# Patient Record
Sex: FEMALE | Race: OTHER | HISPANIC OR LATINO | Employment: FULL TIME | URBAN - METROPOLITAN AREA
[De-identification: names, ages, dates, MRNs, and addresses within clinical notes are randomized per-mention and may not be internally consistent; named-entity substitution may affect disease eponyms.]

---

## 2019-03-21 ENCOUNTER — OFFICE VISIT (OUTPATIENT)
Dept: FAMILY MEDICINE CLINIC | Facility: CLINIC | Age: 19
End: 2019-03-21

## 2019-03-21 VITALS
BODY MASS INDEX: 26.93 KG/M2 | WEIGHT: 152 LBS | DIASTOLIC BLOOD PRESSURE: 76 MMHG | HEIGHT: 63 IN | OXYGEN SATURATION: 98 % | RESPIRATION RATE: 19 BRPM | HEART RATE: 78 BPM | SYSTOLIC BLOOD PRESSURE: 118 MMHG | TEMPERATURE: 97.4 F

## 2019-03-21 DIAGNOSIS — R22.0 RIGHT FACIAL SWELLING: Primary | ICD-10-CM

## 2019-03-21 PROCEDURE — 99212 OFFICE O/P EST SF 10 MIN: CPT | Performed by: PHYSICIAN ASSISTANT

## 2019-03-21 NOTE — PROGRESS NOTES
Subjective:     Patient ID: Karoline Dwyer  is a 22 y o  female with no known PMH who presents today in clinic for left sided facial swelling     - Patient states about one week ago, her boyfriend noticed that her left upper jaw area bulges out more than the right upper jaw area when she is actively chewing  She denies any fevers, chills, cough, congestion, tooth pain, mouth pain, or pain, warmth, or redness of the upper left jaw area  She denies any recent or prior trauma or injuries to the affected area  Of note, patient does grind her teeth at night and wears a mouth guard at night  Her last dentist visit was in November or December 2018  - Patient went to  urgent care for further evaluation  They told her it was normal and to take aleve as needed to decrease swelling  The following portions of the patient's history were reviewed and updated as appropriate: allergies, current medications, past family history, past medical history, past social history, past surgical history and problem list         Review of Systems   Constitutional: Negative for appetite change, fatigue, fever and unexpected weight change  HENT: Positive for facial swelling (right upper jaw area)  Negative for congestion, ear pain, postnasal drip, rhinorrhea and sore throat  Eyes: Negative for pain and visual disturbance  Respiratory: Negative for cough, chest tightness and shortness of breath  Cardiovascular: Negative for chest pain, palpitations and leg swelling  Gastrointestinal: Negative for abdominal pain, constipation, diarrhea, nausea and vomiting  Genitourinary: Negative for difficulty urinating and dysuria  Musculoskeletal: Negative for arthralgias and back pain  Skin: Negative for rash  Neurological: Negative for dizziness, numbness and headaches  Psychiatric/Behavioral: Negative for behavioral problems and suicidal ideas  The patient is not nervous/anxious           Objective:   Vitals:    03/21/19 1251   BP: 118/76   Pulse: 78   Resp: 19   Temp: (!) 97 4 °F (36 3 °C)   SpO2: 98%        Physical Exam   Constitutional: She is oriented to person, place, and time  She appears well-developed and well-nourished  HENT:   Head: Normocephalic and atraumatic  Right Ear: Tympanic membrane, external ear and ear canal normal    Left Ear: Tympanic membrane, external ear and ear canal normal    Nose: Nose normal    Mouth/Throat: Uvula is midline, oropharynx is clear and moist and mucous membranes are normal  No oropharyngeal exudate, posterior oropharyngeal edema or posterior oropharyngeal erythema  Eyes: Pupils are equal, round, and reactive to light  Conjunctivae and EOM are normal    Neck: Normal range of motion  Neck supple  Clicking of TMJ noted on left side  No pain associated  No bulging or swelling noted of right upper jaw area  Cardiovascular: Normal rate, regular rhythm and intact distal pulses  Murmur heard  Pulmonary/Chest: Effort normal and breath sounds normal  She has no wheezes  Abdominal: Soft  Bowel sounds are normal  There is no tenderness  Musculoskeletal: Normal range of motion  She exhibits no edema  Neurological: She is alert and oriented to person, place, and time  Skin: Skin is warm and dry  Capillary refill takes less than 2 seconds  Psychiatric: She has a normal mood and affect  Her behavior is normal    Nursing note and vitals reviewed  Assessment/Plan:    Right facial swelling  - Assured patient this is a normal finding  Explained that this area of your face naturally bulges out when chewing  Explained she is also using these same muscles when she grinds her teeth during the night  Advised to continue wearing  at night    - Advised to RTC if she starts to experience pain, warmth, fevers, or extreme swelling of this area  Diagnoses and all orders for this visit:    Right facial swelling      All of the patient's questions were answered   Patient understands and agrees with the above plan  Return if symptoms worsen or fail to improve; for Next scheduled follow up with Dr Rick Magana on 5/17/19       Aysha Ty PA-C  03/21/19

## 2019-03-22 NOTE — ASSESSMENT & PLAN NOTE
- Assured patient this is a normal finding  Explained that this area of your face naturally bulges out when chewing  Explained she is also using these same muscles when she grinds her teeth during the night  Advised to continue wearing  at night    - Advised to RTC if she starts to experience pain, warmth, fevers, or extreme swelling of this area

## 2019-06-24 ENCOUNTER — OFFICE VISIT (OUTPATIENT)
Dept: FAMILY MEDICINE CLINIC | Facility: CLINIC | Age: 19
End: 2019-06-24

## 2019-06-24 VITALS
BODY MASS INDEX: 27.46 KG/M2 | RESPIRATION RATE: 16 BRPM | TEMPERATURE: 97.6 F | DIASTOLIC BLOOD PRESSURE: 70 MMHG | SYSTOLIC BLOOD PRESSURE: 102 MMHG | WEIGHT: 155 LBS | OXYGEN SATURATION: 99 % | HEART RATE: 70 BPM

## 2019-06-24 DIAGNOSIS — R01.1 HEART MURMUR: ICD-10-CM

## 2019-06-24 DIAGNOSIS — Z76.89 ENCOUNTER TO ESTABLISH CARE: Primary | ICD-10-CM

## 2019-06-24 PROBLEM — R22.0 RIGHT FACIAL SWELLING: Status: RESOLVED | Noted: 2019-03-21 | Resolved: 2019-06-24

## 2019-06-24 PROCEDURE — 99203 OFFICE O/P NEW LOW 30 MIN: CPT | Performed by: INTERNAL MEDICINE

## 2019-08-01 ENCOUNTER — HOSPITAL ENCOUNTER (OUTPATIENT)
Dept: NON INVASIVE DIAGNOSTICS | Facility: HOSPITAL | Age: 19
Discharge: HOME | End: 2019-08-01
Payer: COMMERCIAL

## 2019-08-01 DIAGNOSIS — R01.1 HEART MURMUR: ICD-10-CM

## 2019-08-01 PROCEDURE — 93306 TTE W/DOPPLER COMPLETE: CPT

## 2019-08-02 ENCOUNTER — TELEPHONE (OUTPATIENT)
Dept: FAMILY MEDICINE CLINIC | Facility: CLINIC | Age: 19
End: 2019-08-02

## 2019-08-02 PROCEDURE — 93306 TTE W/DOPPLER COMPLETE: CPT | Performed by: INTERNAL MEDICINE

## 2019-10-14 ENCOUNTER — OFFICE VISIT (OUTPATIENT)
Dept: FAMILY MEDICINE CLINIC | Facility: CLINIC | Age: 19
End: 2019-10-14

## 2019-10-14 VITALS
WEIGHT: 167.3 LBS | HEIGHT: 63 IN | DIASTOLIC BLOOD PRESSURE: 76 MMHG | OXYGEN SATURATION: 99 % | BODY MASS INDEX: 29.64 KG/M2 | SYSTOLIC BLOOD PRESSURE: 122 MMHG | TEMPERATURE: 97.3 F | HEART RATE: 81 BPM | RESPIRATION RATE: 18 BRPM

## 2019-10-14 DIAGNOSIS — I37.0 NONRHEUMATIC PULMONARY VALVE STENOSIS: Primary | ICD-10-CM

## 2019-10-14 DIAGNOSIS — Z23 FLU VACCINE NEED: ICD-10-CM

## 2019-10-14 DIAGNOSIS — Z28.21 INFLUENZA VACCINE REFUSED: ICD-10-CM

## 2019-10-14 PROBLEM — Z76.89 ENCOUNTER TO ESTABLISH CARE: Status: RESOLVED | Noted: 2019-06-24 | Resolved: 2019-10-14

## 2019-10-14 PROCEDURE — 99213 OFFICE O/P EST LOW 20 MIN: CPT | Performed by: FAMILY MEDICINE

## 2019-10-14 NOTE — ASSESSMENT & PLAN NOTE
- Per history, congential  - Asymptomatic  - Echo 8/1/2019   Mild pulmonary stenosis with peak gradient 22 mmHg  - Peak gradient < 30 mmHg  Needs repeat Echo and EKG in 5 years or earlier if symptomatic  Discussed this with patient who is agreeable

## 2019-10-14 NOTE — PROGRESS NOTES
Assessment/Plan:    Nonrheumatic pulmonary valve stenosis  - Per history, congential  - Asymptomatic  - Echo 8/1/2019   Mild pulmonary stenosis with peak gradient 22 mmHg  - Peak gradient < 30 mmHg  Needs repeat Echo and EKG in 5 years or earlier if symptomatic  Discussed this with patient who is agreeable  Follow up   - Small patch of dryness R eye lid with no e/o inflammation/ scales  - No vision disturbance/ watering   No TTP    - Advised to clean cosmetic brushes  May use topical emollients as needed  RTC prn      Diagnoses and all orders for this visit:    Nonrheumatic pulmonary valve stenosis    Flu vaccine need  -     influenza vaccine, 3652-4507, quadrivalent, recombinant, PF, 0 5 mL, for patients 18 yr+ (FLUBLOK)    Influenza vaccine refused          Subjective:      Patient ID: Bean Salas is a 22 y o  female  22 y o female presents for f/u of Echo  History of congenital heart murmur  Denies any CP/ palpitation/ SOB/ orthopnea/ PND  C/O right eyelid patch of dryness that appears to be improving  No vision disturbance  No watering of eyes/ eye drainage  The following portions of the patient's history were reviewed and updated as appropriate: She  has a past medical history of Heart murmur  Patient Active Problem List    Diagnosis Date Noted    Nonrheumatic pulmonary valve stenosis 10/14/2019    Heart murmur 06/24/2019     She  has a past surgical history that includes Tonsillectomy  Her family history includes Coronary artery disease in her maternal grandfather; Diabetes in her maternal grandmother  She  reports that she has never smoked  She has never used smokeless tobacco  She reports that she does not drink alcohol or use drugs  No current outpatient medications on file  No current facility-administered medications for this visit  No current outpatient medications on file prior to visit  No current facility-administered medications on file prior to visit  She has No Known Allergies       Review of Systems   Constitutional: Negative for chills and fever  HENT: Negative for congestion and trouble swallowing  Eyes: Negative for photophobia, pain, discharge, redness, itching and visual disturbance  Respiratory: Negative for cough, shortness of breath and wheezing  Cardiovascular: Negative for chest pain, palpitations and leg swelling  Gastrointestinal: Negative for abdominal pain, blood in stool, constipation, diarrhea, nausea and vomiting  Genitourinary: Negative for dysuria and hematuria  Skin: Negative for rash  Neurological: Negative for seizures, weakness and headaches  Objective:      /76 (BP Location: Left arm, Patient Position: Sitting, Cuff Size: Standard)   Pulse 81   Temp (!) 97 3 °F (36 3 °C) (Temporal)   Resp 18   Ht 5' 3" (1 6 m)   Wt 75 9 kg (167 lb 4 8 oz)   LMP 09/18/2019 (Approximate)   SpO2 99%   Breastfeeding? No   BMI 29 64 kg/m²          Physical Exam   Constitutional: She is oriented to person, place, and time  She appears well-developed and well-nourished  No distress  HENT:   Head: Normocephalic and atraumatic  Mouth/Throat: No oropharyngeal exudate  Eyes: Pupils are equal, round, and reactive to light  Conjunctivae and EOM are normal  Right eye exhibits no discharge  Left eye exhibits no discharge  No scleral icterus  Neck: Normal range of motion  No JVD present  Cardiovascular: Normal rate, regular rhythm and normal heart sounds  Exam reveals no gallop and no friction rub  No murmur heard  Pulmonary/Chest: Effort normal  No respiratory distress  She has no wheezes  She has no rales  Abdominal: Soft  She exhibits no distension  There is no tenderness  There is no rebound and no guarding  Musculoskeletal: Normal range of motion  She exhibits no edema or tenderness  Lymphadenopathy:     She has no cervical adenopathy     Neurological: She is alert and oriented to person, place, and time  She exhibits normal muscle tone  Skin: Skin is warm  No rash noted  She is not diaphoretic  Psychiatric: She has a normal mood and affect  Vitals reviewed

## 2019-11-18 ENCOUNTER — TELEPHONE (OUTPATIENT)
Dept: FAMILY MEDICINE CLINIC | Facility: CLINIC | Age: 19
End: 2019-11-18

## 2019-11-18 ENCOUNTER — OFFICE VISIT (OUTPATIENT)
Dept: FAMILY MEDICINE CLINIC | Facility: CLINIC | Age: 19
End: 2019-11-18

## 2019-11-18 VITALS
RESPIRATION RATE: 16 BRPM | BODY MASS INDEX: 29.46 KG/M2 | TEMPERATURE: 98.9 F | DIASTOLIC BLOOD PRESSURE: 76 MMHG | HEART RATE: 88 BPM | SYSTOLIC BLOOD PRESSURE: 134 MMHG | WEIGHT: 166.3 LBS | OXYGEN SATURATION: 99 % | HEIGHT: 63 IN

## 2019-11-18 DIAGNOSIS — B95.8 STAPH SKIN INFECTION: Primary | ICD-10-CM

## 2019-11-18 DIAGNOSIS — L08.9 STAPH SKIN INFECTION: Primary | ICD-10-CM

## 2019-11-18 PROCEDURE — 3008F BODY MASS INDEX DOCD: CPT | Performed by: NURSE PRACTITIONER

## 2019-11-18 PROCEDURE — 1036F TOBACCO NON-USER: CPT | Performed by: NURSE PRACTITIONER

## 2019-11-18 PROCEDURE — 99213 OFFICE O/P EST LOW 20 MIN: CPT | Performed by: NURSE PRACTITIONER

## 2019-11-18 RX ORDER — MUPIROCIN CALCIUM 20 MG/G
CREAM TOPICAL 2 TIMES DAILY
Qty: 15 G | Refills: 0 | Status: SHIPPED | OUTPATIENT
Start: 2019-11-18 | End: 2021-02-10 | Stop reason: ALTCHOICE

## 2019-11-18 NOTE — TELEPHONE ENCOUNTER
Called [facility] to see if they had a formulary to give to us they had stated they did not, requesting a similar med or sometimes the ointment instead of the cream goes through

## 2019-11-18 NOTE — PATIENT INSTRUCTIONS
Impetigo, Ambulatory Care   GENERAL INFORMATION:   Impetigo  is a skin infection caused by bacteria  The infection can cause sores to form anywhere on your body  The sores develop watery or pus-filled blisters that break and form thick crusts  Impetigo is most common in children and spreads easily from person to person  Seek immediate care for the following symptoms:   · Painful, red, warm skin around the blisters    · Swollen face    · Urinating less than usual or blood in your urine  Treatment for impetigo  includes antibiotics to treat the bacterial infection  Antibiotics may be given as a pill or cream  Wash your skin and gently remove any crusts before you apply the antibiotic cream   Clean your sores safely:  Wash your skin sores with antibacterial soap and water  You may need to do this 2 to 3 times each day until the sores heal  If the area is crusted, gently wash the sores with gauze or a clean washcloth to remove the crust  Pat the area dry with a clean towel  Wash your hands, the washcloth, and the towel after you clean the area around the sores  Prevent the spread of impetigo:   · Avoid direct contact  You can spread impetigo if someone touches or uses something that touched your infected skin  You can also spread impetigo on your own body when you touch the area and then touch somewhere else  Keep the sores covered with gauze so you will not scratch or touch them  Keep your fingernails short  Your child may need to wear mittens so he does not scratch his sores  · Wash your hands often  Always wash your hands after you touch the infected area  Wash your hands before you touch food, your eyes, or other people  If no water is available, use an alcohol-based gel to clean your hands  · Wash household items  Do not share or reuse items that have come in contact with impetigo sores  Examples include bedding, towels, washcloths, and eating utensils   These items may be used again after they have been washed with hot water and soap  Return to work or school: You may return to work or school 48 hours after you start the antibiotic medicine  If your child has impetigo, tell his school or  center about the infection  Follow up with your healthcare provider as directed:  Write down your questions so you remember to ask them during your visits  CARE AGREEMENT:   You have the right to help plan your care  Learn about your health condition and how it may be treated  Discuss treatment options with your caregivers to decide what care you want to receive  You always have the right to refuse treatment  The above information is an  only  It is not intended as medical advice for individual conditions or treatments  Talk to your doctor, nurse or pharmacist before following any medical regimen to see if it is safe and effective for you  © 2014 [address] is for End User's use only and may not be sold, redistributed or otherwise used for commercial purposes  All illustrations and images included in CareNotes® are the copyrighted property of [employer]  or [employer]ear[employer]

## 2019-11-18 NOTE — TELEPHONE ENCOUNTER
Pt contacted office stating ins does not cover medication prescribed today   Pt was informed med cost $200

## 2019-11-18 NOTE — PROGRESS NOTES
Assessment/Plan:    Staph skin infection  -Patient with lesion to the scalp appearing to be bacterial in nature consistent with recent wound attained during hair appointment  Apply mupirocin as directed  Discussed with patient to avoid sharing hats, brushes, towels  Avoid irritating hair products such as hairspray, gel, creams  Discussed with patient that if not resolved or improving over next 1-2 weeks return for re-evaluation  Problem List Items Addressed This Visit     None      Visit Diagnoses     Staph skin infection    -  Primary    Relevant Medications    mupirocin (BACTROBAN) 2 % cream            Subjective:      Patient ID: Edmund Styles is a 22 y o  female  Patient is a 21 yo female who presents today for evaluation of rash to scalp  She reports that she got her hair blow dried in the salon on Friday at which time she sustained a small abrasion to the scalp from the brush  She states that there were no chemicals such as dyes or relaxers applied to her hair by the salon  On Saturday she noticed that there was a tender area to the scalp with yellow drainage  She reports that there is some pruritus to the area  There is no hair loss  There are no similar lesions to the rest of the scalp or other area of the body  She has not applied any over the counter treatments to the area  The following portions of the patient's history were reviewed and updated as appropriate: allergies, current medications, past family history, past medical history, past social history, past surgical history and problem list     Review of Systems   Constitutional: Negative for chills and fever  HENT: Negative for congestion  Respiratory: Negative for cough and shortness of breath  Cardiovascular: Negative for chest pain  Skin: Positive for rash (scalp)  Neurological: Negative for headaches           Objective:      /76 (BP Location: Left arm, Patient Position: Sitting, Cuff Size: Adult)   Pulse 88 Temp 98 9 °F (37 2 °C) (Tympanic)   Resp 16   Ht 5' 3" (1 6 m)   Wt 75 4 kg (166 lb 4 8 oz)   LMP 11/08/2019 (Exact Date)   SpO2 99%   Breastfeeding? No   BMI 29 46 kg/m²          Physical Exam   Constitutional: She is oriented to person, place, and time  She appears well-developed and well-nourished  No distress  HENT:   Head: Normocephalic and atraumatic  Eyes: Conjunctivae are normal    Neck: Neck supple  Cardiovascular: Normal rate and regular rhythm  Murmur heard  Pulmonary/Chest: Effort normal and breath sounds normal  No respiratory distress  Neurological: She is alert and oriented to person, place, and time  Skin: Skin is warm and dry  She is not diaphoretic  Nursing note and vitals reviewed

## 2019-12-16 ENCOUNTER — OFFICE VISIT (OUTPATIENT)
Dept: FAMILY MEDICINE CLINIC | Facility: CLINIC | Age: 19
End: 2019-12-16

## 2019-12-16 VITALS
OXYGEN SATURATION: 99 % | BODY MASS INDEX: 28.88 KG/M2 | SYSTOLIC BLOOD PRESSURE: 110 MMHG | WEIGHT: 163 LBS | RESPIRATION RATE: 16 BRPM | HEIGHT: 63 IN | DIASTOLIC BLOOD PRESSURE: 60 MMHG | TEMPERATURE: 97.6 F | HEART RATE: 75 BPM

## 2019-12-16 DIAGNOSIS — Z23 ENCOUNTER FOR IMMUNIZATION: ICD-10-CM

## 2019-12-16 DIAGNOSIS — Z00.00 PHYSICAL EXAM: Primary | ICD-10-CM

## 2019-12-16 DIAGNOSIS — N92.6 MISSED MENSES: ICD-10-CM

## 2019-12-16 DIAGNOSIS — L65.9 HAIR LOSS: ICD-10-CM

## 2019-12-16 LAB — SL AMB POCT URINE HCG: NEGATIVE

## 2019-12-16 PROCEDURE — 99395 PREV VISIT EST AGE 18-39: CPT | Performed by: FAMILY MEDICINE

## 2019-12-16 PROCEDURE — 81025 URINE PREGNANCY TEST: CPT | Performed by: FAMILY MEDICINE

## 2019-12-16 NOTE — ASSESSMENT & PLAN NOTE
- Patient seen in office on 11/18/2019 for ? scalp staph infection and treated with mupirocin  - Patch of hair loss with no e/o scarring and + Exclamation tawanda sign suspicious for alopecia areata  Per d/w patient referral to derm placed     - Check CBC, TSH

## 2019-12-16 NOTE — PROGRESS NOTES
Assessment/Plan:    Hair loss  - Patient seen in office on 11/18/2019 for ? scalp staph infection and treated with mupirocin  - Patch of hair loss with no e/o scarring and + Exclamation tawanda sign suspicious for alopecia areata  Per d/w patient referral to derm placed  - Check CBC, TSH     Physical exam  - Urine hCG negative at OV  - Recommended repeating urine hCG if her menses delayed by additional 2 weeks   - Discussed contraception with patient and her boyfriend  They prefer using condoms at this point        Diagnoses and all orders for this visit:    Hair loss  -     TSH, 3rd generation with Free T4 reflex; Future  -     CBC and differential; Future  -     Ambulatory referral to Dermatology; Future    Physical exam    Missed menses  -     POCT urine HCG    Encounter for immunization  -     Cancel: influenza vaccine, [id number], quadrivalent, 0 5 mL, preservative-free, for adult and pediatric patients 6 mos+ (AFLURIA, FLUARIX, FLULAVAL, FLUZONE)  -     influenza vaccine, [id number], quadrivalent, recombinant, PF, 0 5 mL, for patients 18 yr+ (FLUBLOK)          Subjective:      Patient ID: Naya Penaloza is a 20 y o  female  20 y o female presents for physical exam  Also concerned with delayed menses  Her cycles are usually about 35 - 40 days duration and her menses has been delayed by about a week now  Sexually active with boy friend and they use condoms  No concern with STI at this visit  Patient seen in office on 11/18/2019 for scalp rash  Was prescribed mupirocin for cocnern of staph infection  States the rash improved but now she noted a patch of hair loss on top of her scalp  No itching over spot  No additional patches of hair loss/ flakes on hair  No prior history  No family H/O alopecia  Family H/O thyroid disease ( hypothyroidism in maternal aunt)         The following portions of the patient's history were reviewed and updated as appropriate: She  has a past medical history of Heart murmur  Patient Active Problem List    Diagnosis Date Noted    Hair loss 12/16/2019    Nonrheumatic pulmonary valve stenosis 10/14/2019    Heart murmur 06/24/2019    Physical exam 06/24/2019     She  has a past surgical history that includes Tonsillectomy  Her family history includes Coronary artery disease in her maternal grandfather; Diabetes in her maternal grandmother  She  reports that she has never smoked  She has never used smokeless tobacco  She reports that she drank alcohol  She reports that she does not use drugs  Current Outpatient Medications   Medication Sig Dispense Refill    BIOTIN PO Take by mouth daily      mupirocin (BACTROBAN) 2 % cream Apply topically 2 (two) times a day 15 g 0     No current facility-administered medications for this visit  Current Outpatient Medications on File Prior to Visit   Medication Sig    BIOTIN PO Take by mouth daily    mupirocin (BACTROBAN) 2 % cream Apply topically 2 (two) times a day    [DISCONTINUED] mupirocin (BACTROBAN) 2 % ointment Apply topically 3 (three) times a day (Patient not taking: Reported on 12/16/2019)     No current facility-administered medications on file prior to visit  She has No Known Allergies       Review of Systems   Constitutional: Negative for chills and fever  HENT: Negative for congestion, rhinorrhea, sinus pain, sore throat and trouble swallowing  Eyes: Negative for visual disturbance  Respiratory: Negative for cough, shortness of breath and wheezing  Cardiovascular: Negative for chest pain, palpitations and leg swelling  Gastrointestinal: Negative for abdominal pain, blood in stool, constipation, diarrhea, nausea and vomiting  Genitourinary: Negative for dysuria and hematuria  Musculoskeletal: Negative for back pain and gait problem  Skin: Negative for rash  Hair loss +    Neurological: Negative for seizures, weakness and headaches  Hematological: Negative for adenopathy  Psychiatric/Behavioral: The patient is not nervous/anxious  Objective:      /60 (BP Location: Left arm, Patient Position: Sitting, Cuff Size: Standard)   Pulse 75   Temp 97 6 °F (36 4 °C) (Temporal)   Resp 16   Ht 5' 3" (1 6 m)   Wt 73 9 kg (163 lb)   LMP 11/08/2019   SpO2 99%   Breastfeeding? No   BMI 28 87 kg/m²          Physical Exam   Constitutional: She is oriented to person, place, and time  She appears well-developed and well-nourished  No distress  HENT:   Head: Normocephalic and atraumatic  Mouth/Throat: Oropharynx is clear and moist  No oropharyngeal exudate  Eyes: Conjunctivae and EOM are normal  Right eye exhibits no discharge  Left eye exhibits no discharge  Neck: Normal range of motion  Cardiovascular: Normal rate, regular rhythm and normal heart sounds  Exam reveals no gallop and no friction rub  No murmur heard  Pulmonary/Chest: Effort normal  No respiratory distress  She has no wheezes  She has no rales  Abdominal: Soft  She exhibits no distension  There is no tenderness  There is no rebound and no guarding  Musculoskeletal: She exhibits no edema or tenderness  Lymphadenopathy:     She has no cervical adenopathy  Neurological: She is alert and oriented to person, place, and time  She exhibits normal muscle tone  Skin: Skin is warm  She is not diaphoretic  Psychiatric: She has a normal mood and affect  Vitals reviewed

## 2019-12-16 NOTE — ASSESSMENT & PLAN NOTE
- Urine hCG negative at OV  - Recommended repeating urine hCG if her menses delayed by additional 2 weeks   - Discussed contraception with patient and her boyfriend   They prefer using condoms at this point

## 2020-02-25 ENCOUNTER — OFFICE VISIT (OUTPATIENT)
Dept: FAMILY MEDICINE CLINIC | Facility: CLINIC | Age: 20
End: 2020-02-25

## 2020-02-25 VITALS
BODY MASS INDEX: 29.23 KG/M2 | SYSTOLIC BLOOD PRESSURE: 122 MMHG | WEIGHT: 165 LBS | DIASTOLIC BLOOD PRESSURE: 70 MMHG | HEART RATE: 74 BPM | RESPIRATION RATE: 18 BRPM | TEMPERATURE: 97.9 F | OXYGEN SATURATION: 99 %

## 2020-02-25 DIAGNOSIS — R30.0 DYSURIA: ICD-10-CM

## 2020-02-25 DIAGNOSIS — R10.30 LOWER ABDOMINAL PAIN: Primary | ICD-10-CM

## 2020-02-25 LAB
SL AMB  POCT GLUCOSE, UA: NORMAL
SL AMB LEUKOCYTE ESTERASE,UA: NORMAL
SL AMB POCT BILIRUBIN,UA: NEGATIVE
SL AMB POCT BLOOD,UA: NORMAL
SL AMB POCT CLARITY,UA: CLEAR
SL AMB POCT COLOR,UA: YELLOW
SL AMB POCT KETONES,UA: NEGATIVE
SL AMB POCT NITRITE,UA: NEGATIVE
SL AMB POCT PH,UA: 5
SL AMB POCT SPECIFIC GRAVITY,UA: 1.02
SL AMB POCT URINE HCG: NEGATIVE
SL AMB POCT URINE PROTEIN: NORMAL
SL AMB POCT UROBILINOGEN: NEGATIVE

## 2020-02-25 PROCEDURE — 81002 URINALYSIS NONAUTO W/O SCOPE: CPT | Performed by: FAMILY MEDICINE

## 2020-02-25 PROCEDURE — 81025 URINE PREGNANCY TEST: CPT | Performed by: FAMILY MEDICINE

## 2020-02-25 PROCEDURE — 99214 OFFICE O/P EST MOD 30 MIN: CPT | Performed by: FAMILY MEDICINE

## 2020-02-25 PROCEDURE — 1036F TOBACCO NON-USER: CPT | Performed by: FAMILY MEDICINE

## 2020-02-25 NOTE — PATIENT INSTRUCTIONS
Abdominal Pain   AMBULATORY CARE:   Abdominal pain  can be dull, achy, or sharp  You may have pain in one area of your abdomen, or in your entire abdomen  Your pain may be caused by a condition such as constipation, food sensitivity or poisoning, infection, or a blockage  Abdominal pain can also be from a hernia, appendicitis, or an ulcer  Liver, gallbladder, or kidney conditions can also cause abdominal pain  The cause of your abdominal pain may be unknown  Seek care immediately if:   · You have new chest pain or shortness of breath  · You have pulsing pain in your upper abdomen or lower back that suddenly becomes constant  · Your pain is in the right lower abdominal area and worsens with movement  · You have a fever over 100 4°F (38°C) or shaking chills  · You are vomiting and cannot keep food or liquids down  · Your pain does not improve or gets worse over the next 8 to 12 hours  · You see blood in your vomit or bowel movements, or they look black and tarry  · Your skin or the whites of your eyes turn yellow  · You are a woman and have a large amount of vaginal bleeding that is not your monthly period  Contact your healthcare provider if:   · You have pain in your lower back  · You are a man and have pain in your testicles  · You have pain when you urinate  · You have questions or concerns about your condition or care  Treatment for abdominal pain  may include medicine to calm your stomach, prevent vomiting, or decrease pain  Follow up with your healthcare provider as directed:  Write down your questions so you remember to ask them during your visits  © 2017 [address]  Information is for End User's use only and may not be sold, redistributed or otherwise used for commercial purposes  All illustrations and images included in CareNotes® are the copyrighted property of [employer]  or [employer] Kear[employer]    The above information is an educational aid only  It is not intended as medical advice for individual conditions or treatments  Talk to your doctor, nurse or pharmacist before following any medical regimen to see if it is safe and effective for you

## 2020-02-25 NOTE — PROGRESS NOTES
Assessment/Plan:    Lower abdominal pain  Suprapubic abdominal pain that started earlier today, a few minutes after sexual intercourse  Denies trauma to the area, or new position use  Pain was a 10/10 at the time  Worse with bending down, better with laying down  No associated nausea, vomiting or diarrhea  No urinary symptoms including dysuria, frequency or urgency  Symptoms are unlikely appendicitis or pancreatitis due to the absence of nausea, vomiting, or diarrhea, and absence of fever  Speculum exam performed at the office today was unremarkable  Bimanual exam didn't yield any specific finding  POCT beta HCG and UA negative  Pain is likely musculoskeletal in nature, or it might be related to menstrual period  Last LMP was 1/24/2020, her period is due tomorrow     -Will order a transvaginal ultrasound  -Continue ibuprofen for pain  -Return to office if symptoms worsen   -Call the office or go to the ER if fever develop, or if nausea, vomiting or worsening abdominal pain          Diagnoses and all orders for this visit:    Lower abdominal pain  -     US abdomen and pelvis with transvaginal; Future    Dysuria  -     POCT urine dip  -     POCT urine HCG          Subjective:      Patient ID: Ye Menjivar is a 20 y o  female  HPI  Ye Menjivar is a 20 y o  female with no significant PMH who presents for same day visit due to abdominal pain  The pain  earlier today about 2 hours ago, a few minutes after sexual intercourse  Denies trauma to the area, or new sexual position use  Pain was a 10/10 at the time  Worse with bending down, better with laying down  Denies associated nausea, vomiting or diarrhea  Denies urinary symptoms including dysuria, frequency or urgency  Ibuprofen provided some relief and she now rate the pain as 7/10 in the beginning of the visit and rate it to 5/10 at the end of the visit  She reports that her last menstrual period was on 1/24/2020, her period is due tomorrow   She doesn't use any mode of contraction and is not currently trying to conceive  She denies similar symptoms in the past      The following portions of the patient's history were reviewed and updated as appropriate: allergies, current medications, past family history, past medical history, past social history, past surgical history and problem list     Review of Systems   Constitutional: Negative for appetite change, chills, diaphoresis, fatigue and fever  Respiratory: Negative for cough, chest tightness, shortness of breath, wheezing and stridor  Cardiovascular: Negative for chest pain, palpitations and leg swelling  Gastrointestinal: Positive for abdominal pain  Negative for abdominal distention, blood in stool, constipation, diarrhea, nausea and vomiting  Genitourinary: Positive for pelvic pain  Negative for difficulty urinating, dyspareunia, dysuria, hematuria, menstrual problem and urgency  Neurological: Negative for dizziness and facial asymmetry  Objective:      /70 (BP Location: Right arm, Patient Position: Sitting, Cuff Size: Adult)   Pulse 74   Temp 97 9 °F (36 6 °C)   Resp 18   Wt 74 8 kg (165 lb)   LMP 01/24/2020 (Exact Date)   SpO2 99%   BMI 29 23 kg/m²          Physical Exam   Constitutional: She is oriented to person, place, and time  She appears well-developed and well-nourished  No distress  HENT:   Head: Normocephalic and atraumatic  Eyes: No scleral icterus  Cardiovascular: Normal rate and normal heart sounds  No murmur heard  Pulmonary/Chest: Effort normal and breath sounds normal  No respiratory distress  Abdominal: Soft  Bowel sounds are normal  She exhibits no distension and no mass  There is tenderness (suprapubic and RLQ)  There is no rebound and no guarding  Musculoskeletal: Normal range of motion  She exhibits no deformity  Neurological: She is alert and oriented to person, place, and time  No cranial nerve deficit  Skin: Skin is warm and dry   No rash noted    Psychiatric: She has a normal mood and affect  Vitals reviewed

## 2020-02-25 NOTE — ASSESSMENT & PLAN NOTE
Suprapubic abdominal pain that started earlier today, a few minutes after sexual intercourse  Denies trauma to the area, or new position use  Pain was a 10/10 at the time  Worse with bending down, better with laying down  No associated nausea, vomiting or diarrhea  No urinary symptoms including dysuria, frequency or urgency  Symptoms are unlikely appendicitis or pancreatitis due to the absence of nausea, vomiting, or diarrhea, and absence of fever  Speculum exam performed at the office today was unremarkable  Bimanual exam didn't yield any specific finding  POCT beta HCG and UA negative  Pain is likely musculoskeletal in nature, or it might be related to menstrual period   Last LMP was 1/24/2020, her period is due tomorrow     -Will order a transvaginal ultrasound  -Continue ibuprofen for pain  -Return to office if symptoms worsen   -Call the office or go to the ER if fever develop, or if nausea, vomiting or worsening abdominal pain

## 2020-02-25 NOTE — LETTER
February 25, 2020     Patient: Ruby Pires   YOB: 2000   Date of Visit: 2/25/2020       To Whom it May Concern:    Ruby Pires is under my professional care  She was seen in my office on 2/25/2020  She may return to work on 2/26/2020  If you have any questions or concerns, please don't hesitate to call           Sincerely,          Roxi Vega MD

## 2020-02-28 ENCOUNTER — HOSPITAL ENCOUNTER (OUTPATIENT)
Dept: ULTRASOUND IMAGING | Facility: HOSPITAL | Age: 20
Discharge: HOME | End: 2020-02-28
Payer: COMMERCIAL

## 2020-02-28 ENCOUNTER — TELEPHONE (OUTPATIENT)
Dept: FAMILY MEDICINE CLINIC | Facility: CLINIC | Age: 20
End: 2020-02-28

## 2020-02-28 DIAGNOSIS — N83.201 RIGHT OVARIAN CYST: Primary | ICD-10-CM

## 2020-02-28 DIAGNOSIS — R10.30 LOWER ABDOMINAL PAIN: ICD-10-CM

## 2020-02-28 PROCEDURE — 76830 TRANSVAGINAL US NON-OB: CPT

## 2020-02-28 PROCEDURE — 76856 US EXAM PELVIC COMPLETE: CPT

## 2020-02-28 NOTE — TELEPHONE ENCOUNTER
Please let patient know CF showed lesion on right ovary  Recommendation to have CT pelvis which has been ordered     Thanks, Juventino Gould

## 2020-05-15 ENCOUNTER — TELEPHONE (OUTPATIENT)
Dept: FAMILY MEDICINE CLINIC | Facility: CLINIC | Age: 20
End: 2020-05-15

## 2020-05-15 ENCOUNTER — HOSPITAL ENCOUNTER (OUTPATIENT)
Dept: CT IMAGING | Facility: HOSPITAL | Age: 20
Discharge: HOME | End: 2020-05-15
Attending: FAMILY MEDICINE
Payer: COMMERCIAL

## 2020-05-15 DIAGNOSIS — N83.201 RIGHT OVARIAN CYST: ICD-10-CM

## 2020-05-15 DIAGNOSIS — D27.0 DERMOID CYST OF OVARY, RIGHT: Primary | ICD-10-CM

## 2020-05-15 PROCEDURE — 72193 CT PELVIS W/DYE: CPT

## 2020-05-15 RX ADMIN — IOHEXOL 100 ML: 350 INJECTION, SOLUTION INTRAVENOUS at 10:06

## 2020-06-01 ENCOUNTER — TELEPHONE (OUTPATIENT)
Dept: OBGYN CLINIC | Facility: CLINIC | Age: 20
End: 2020-06-01

## 2020-06-02 ENCOUNTER — OFFICE VISIT (OUTPATIENT)
Dept: OBGYN CLINIC | Facility: CLINIC | Age: 20
End: 2020-06-02

## 2020-06-02 VITALS — BODY MASS INDEX: 29.06 KG/M2 | HEIGHT: 63 IN | HEART RATE: 90 BPM | TEMPERATURE: 98.1 F | WEIGHT: 164 LBS

## 2020-06-02 DIAGNOSIS — N83.201 RIGHT OVARIAN CYST: Primary | ICD-10-CM

## 2020-06-02 DIAGNOSIS — D27.0 DERMOID CYST OF OVARY, RIGHT: ICD-10-CM

## 2020-06-02 PROCEDURE — 99213 OFFICE O/P EST LOW 20 MIN: CPT | Performed by: OBSTETRICS & GYNECOLOGY

## 2020-06-02 PROCEDURE — 3008F BODY MASS INDEX DOCD: CPT | Performed by: OBSTETRICS & GYNECOLOGY

## 2020-06-02 PROCEDURE — 1036F TOBACCO NON-USER: CPT | Performed by: OBSTETRICS & GYNECOLOGY

## 2020-06-17 ENCOUNTER — OFFICE VISIT (OUTPATIENT)
Dept: OBGYN CLINIC | Facility: CLINIC | Age: 20
End: 2020-06-17

## 2020-06-17 VITALS
HEART RATE: 99 BPM | SYSTOLIC BLOOD PRESSURE: 119 MMHG | DIASTOLIC BLOOD PRESSURE: 81 MMHG | WEIGHT: 165 LBS | TEMPERATURE: 98.6 F | BODY MASS INDEX: 29.23 KG/M2

## 2020-06-17 DIAGNOSIS — D36.9 DERMOID CYST: Primary | ICD-10-CM

## 2020-06-17 DIAGNOSIS — Z11.3 SCREENING EXAMINATION FOR STD (SEXUALLY TRANSMITTED DISEASE): ICD-10-CM

## 2020-06-17 PROBLEM — N83.201 RIGHT OVARIAN CYST: Status: RESOLVED | Noted: 2020-06-02 | Resolved: 2020-06-17

## 2020-06-17 PROCEDURE — 99213 OFFICE O/P EST LOW 20 MIN: CPT | Performed by: OBSTETRICS & GYNECOLOGY

## 2020-06-17 PROCEDURE — 1036F TOBACCO NON-USER: CPT | Performed by: OBSTETRICS & GYNECOLOGY

## 2021-01-13 ENCOUNTER — OFFICE VISIT (OUTPATIENT)
Dept: FAMILY MEDICINE CLINIC | Facility: CLINIC | Age: 21
End: 2021-01-13

## 2021-01-13 VITALS
RESPIRATION RATE: 18 BRPM | WEIGHT: 165 LBS | SYSTOLIC BLOOD PRESSURE: 122 MMHG | DIASTOLIC BLOOD PRESSURE: 82 MMHG | HEIGHT: 63 IN | TEMPERATURE: 97.6 F | BODY MASS INDEX: 29.23 KG/M2 | HEART RATE: 93 BPM | OXYGEN SATURATION: 98 %

## 2021-01-13 DIAGNOSIS — E66.3 OVERWEIGHT: ICD-10-CM

## 2021-01-13 DIAGNOSIS — R42 DIZZINESS: Primary | ICD-10-CM

## 2021-01-13 DIAGNOSIS — Z00.00 HEALTHCARE MAINTENANCE: ICD-10-CM

## 2021-01-13 PROCEDURE — 1036F TOBACCO NON-USER: CPT | Performed by: PHYSICIAN ASSISTANT

## 2021-01-13 PROCEDURE — 99213 OFFICE O/P EST LOW 20 MIN: CPT | Performed by: PHYSICIAN ASSISTANT

## 2021-01-13 PROCEDURE — 3008F BODY MASS INDEX DOCD: CPT | Performed by: PHYSICIAN ASSISTANT

## 2021-01-13 RX ORDER — MECLIZINE HCL 12.5 MG/1
12.5 TABLET ORAL 3 TIMES DAILY PRN
Qty: 30 TABLET | Refills: 0 | Status: SHIPPED | OUTPATIENT
Start: 2021-01-13 | End: 2021-02-10 | Stop reason: ALTCHOICE

## 2021-01-13 NOTE — PROGRESS NOTES
Assessment/Plan:    Dizziness  - Will order CBC, CMP, TSH to rule out anemia, electrolyte abnormalities, or thyroid dysfunction  - Will trial meclizine 12 5 mg, to be taken 3 times daily as needed for dizziness   - Advised patient to be sure she is drinking plenty of water throughout the day and to avoid going long periods of time without eating  Diagnoses and all orders for this visit:    Dizziness  -     meclizine (ANTIVERT) 12 5 MG tablet; Take 1 tablet (12 5 mg total) by mouth 3 (three) times a day as needed for dizziness    Healthcare maintenance  -     CBC and differential; Future  -     Comprehensive metabolic panel; Future  -     TSH, 3rd generation with Free T4 reflex; Future    Overweight      All of patients questions were answered  Patient understands and agrees with the above plan  Return in 4 weeks (on 2/10/2021), or if symptoms worsen or fail to improve, for Annual physical     Ariane Mack PA-C  01/13/21  [facility]           Subjective:     Patient ID: Fatmata Engel  is a 23 y o  female  has a past medical history of Heart murmur and Right ovarian cyst  who presents today in office for same-day visit for dizziness      - Patient is a 23 y o  female who presents today for same-day visit for dizziness  Patient notes for the past week she has been experiencing episodes of dizziness  Patient notes at first she was experiencing the dizziness if she would get up too fast from a seated position to a standing position  Patient notes the dizziness would last for a few seconds and then resolve  However, patient notes then she started to develop dizziness episodes at other times as well  Patient notes she could be standing or sitting and experience episodes  Patient notes she does have some nausea at times as well and she has also noticed her right eye twitching sometimes  Patient notes she describes the dizziness as she is the 1 spinning and it is hard to keep her balance    Patient notes he sometimes also has headaches, but she is supposed to be wearing glasses  Patient notes she is eating throughout the day usually and does drink water  Patient notes her mother has a diagnosis of vertigo and she is thinking she has something similar  The following portions of the patient's history were reviewed and updated as appropriate: allergies, current medications, past family history, past medical history, past social history, past surgical history and problem list         Review of Systems   Constitutional: Negative for chills, fatigue and fever  HENT: Negative for congestion and sore throat  Eyes: Negative for pain and visual disturbance  Respiratory: Negative for cough, chest tightness and shortness of breath  Cardiovascular: Negative for chest pain and palpitations  Gastrointestinal: Negative for abdominal pain, constipation, diarrhea, nausea and vomiting  Genitourinary: Negative for difficulty urinating, dysuria and menstrual problem  Musculoskeletal: Negative for arthralgias  Skin: Negative for rash  Neurological: Positive for dizziness and headaches (Occasionally)  Psychiatric/Behavioral: The patient is not nervous/anxious  BMI Counseling: Body mass index is 29 23 kg/m²  The BMI is above normal  Nutrition recommendations include encouraging healthy choices of fruits and vegetables, consuming healthier snacks, moderation in carbohydrate intake and reducing intake of saturated and trans fat  Exercise recommendations include moderate physical activity 150 minutes/week  No pharmacotherapy was ordered  Objective:   Vitals:    01/13/21 1545   BP: 122/82   BP Location: Right arm   Patient Position: Sitting   Cuff Size: Standard   Pulse: 93   Resp: 18   Temp: 97 6 °F (36 4 °C)   TempSrc: Temporal   SpO2: 98%   Weight: 74 8 kg (165 lb)   Height: 5' 3" (1 6 m)         Physical Exam  Vitals signs and nursing note reviewed     Constitutional: Appearance: She is well-developed  HENT:      Head: Normocephalic and atraumatic  Right Ear: External ear normal       Left Ear: External ear normal       Nose: Nose normal       Mouth/Throat:      Pharynx: Uvula midline  Eyes:      Extraocular Movements: Extraocular movements intact  Conjunctiva/sclera: Conjunctivae normal       Pupils: Pupils are equal, round, and reactive to light  Neck:      Musculoskeletal: Normal range of motion and neck supple  Cardiovascular:      Rate and Rhythm: Normal rate and regular rhythm  Heart sounds: Normal heart sounds  No murmur  Pulmonary:      Effort: Pulmonary effort is normal       Breath sounds: Normal breath sounds  No wheezing  Musculoskeletal:         General: No swelling  Skin:     General: Skin is warm and dry  Neurological:      Mental Status: She is alert and oriented to person, place, and time  Coordination: Coordination is intact  Romberg sign negative   Coordination normal    Psychiatric:         Speech: Speech normal

## 2021-01-13 NOTE — PATIENT INSTRUCTIONS
Vertigo   WHAT YOU NEED TO KNOW:   Vertigo is a condition that causes you to feel dizzy  You may feel that you or everything around you is moving or spinning  You may also feel like you are being pulled down or toward your side  DISCHARGE INSTRUCTIONS:   Return to the emergency department if:   · You have a headache and a stiff neck  · You have shaking chills and a fever  · You vomit over and over with no relief  · You have blood, pus, or fluid coming out of your ears  · You are confused  Contact your healthcare provider if:   · Your symptoms do not get better with treatment  · You have questions about your condition or care  Medicines:   · Medicine  may be given to help relieve your symptoms  · Take your medicine as directed  Contact your healthcare provider if you think your medicine is not helping or if you have side effects  Tell him or her if you are allergic to any medicine  Keep a list of the medicines, vitamins, and herbs you take  Include the amounts, and when and why you take them  Bring the list or the pill bottles to follow-up visits  Carry your medicine list with you in case of an emergency  Manage your symptoms:   · Do not drive , walk without help, or operate heavy machinery when you are dizzy  · Move slowly  when you move from one position to another position  Get up slowly from sitting or lying down  Sit or lie down right away if you feel dizzy  · Drink plenty of liquids  Liquids help prevent dehydration  Ask how much liquid to drink each day and which liquids are best for you  · Vestibular and balance rehabilitation therapy (VBRT)  is used to teach you exercises to improve your balance and strength  These exercises may help decrease your vertigo and improve your balance  Ask for more information about this therapy  Follow up with your healthcare provider as directed:  Write down your questions so you remember to ask them during your visits     © Copyright [address] Information is for [employer] use only and may not be sold, redistributed or otherwise used for commercial purposes  All illustrations and images included in CareNotes® are the copyrighted property of A D A M , Inc  or Honorio Langford  The above information is an  only  It is not intended as medical advice for individual conditions or treatments  Talk to your doctor, nurse or pharmacist before following any medical regimen to see if it is safe and effective for you

## 2021-01-15 ENCOUNTER — LAB (OUTPATIENT)
Dept: LAB | Facility: HOSPITAL | Age: 21
End: 2021-01-15
Payer: COMMERCIAL

## 2021-01-15 DIAGNOSIS — Z11.3 SCREENING EXAMINATION FOR STD (SEXUALLY TRANSMITTED DISEASE): ICD-10-CM

## 2021-01-15 DIAGNOSIS — Z00.00 HEALTHCARE MAINTENANCE: ICD-10-CM

## 2021-01-15 LAB
ALBUMIN SERPL BCP-MCNC: 4.4 G/DL (ref 3–5.2)
ALP SERPL-CCNC: 61 U/L (ref 43–122)
ALT SERPL W P-5'-P-CCNC: 53 U/L (ref 9–52)
ANION GAP SERPL CALCULATED.3IONS-SCNC: 6 MMOL/L (ref 5–14)
AST SERPL W P-5'-P-CCNC: 33 U/L (ref 14–36)
BASOPHILS # BLD AUTO: 0 THOUSANDS/ΜL (ref 0–0.1)
BASOPHILS NFR BLD AUTO: 0 % (ref 0–1)
BILIRUB SERPL-MCNC: 0.6 MG/DL
BUN SERPL-MCNC: 15 MG/DL (ref 5–25)
CALCIUM SERPL-MCNC: 9.6 MG/DL (ref 8.4–10.2)
CHLORIDE SERPL-SCNC: 103 MMOL/L (ref 97–108)
CO2 SERPL-SCNC: 29 MMOL/L (ref 22–30)
CREAT SERPL-MCNC: 0.61 MG/DL (ref 0.6–1.2)
EOSINOPHIL # BLD AUTO: 0.1 THOUSAND/ΜL (ref 0–0.4)
EOSINOPHIL NFR BLD AUTO: 1 % (ref 0–6)
ERYTHROCYTE [DISTWIDTH] IN BLOOD BY AUTOMATED COUNT: 12.6 %
GFR SERPL CREATININE-BSD FRML MDRD: 130 ML/MIN/1.73SQ M
GLUCOSE P FAST SERPL-MCNC: 77 MG/DL (ref 70–99)
HCT VFR BLD AUTO: 37.8 % (ref 36–46)
HGB BLD-MCNC: 12.5 G/DL (ref 12–16)
LYMPHOCYTES # BLD AUTO: 2.1 THOUSANDS/ΜL (ref 0.5–4)
LYMPHOCYTES NFR BLD AUTO: 22 % (ref 25–45)
MCH RBC QN AUTO: 30.9 PG (ref 26–34)
MCHC RBC AUTO-ENTMCNC: 33.1 G/DL (ref 31–36)
MCV RBC AUTO: 93 FL (ref 80–100)
MONOCYTES # BLD AUTO: 0.8 THOUSAND/ΜL (ref 0.2–0.9)
MONOCYTES NFR BLD AUTO: 8 % (ref 1–10)
NEUTROPHILS # BLD AUTO: 6.5 THOUSANDS/ΜL (ref 1.8–7.8)
NEUTS SEG NFR BLD AUTO: 68 % (ref 45–65)
PLATELET # BLD AUTO: 198 THOUSANDS/UL (ref 150–450)
PMV BLD AUTO: 10.4 FL (ref 8.9–12.7)
POTASSIUM SERPL-SCNC: 4.2 MMOL/L (ref 3.6–5)
PROT SERPL-MCNC: 7.3 G/DL (ref 5.9–8.4)
RBC # BLD AUTO: 4.05 MILLION/UL (ref 4–5.2)
SODIUM SERPL-SCNC: 138 MMOL/L (ref 137–147)
TSH SERPL DL<=0.05 MIU/L-ACNC: 0.76 UIU/ML (ref 0.47–4.68)
WBC # BLD AUTO: 9.5 THOUSAND/UL (ref 4.5–11)

## 2021-01-15 PROCEDURE — 36415 COLL VENOUS BLD VENIPUNCTURE: CPT

## 2021-01-15 PROCEDURE — 84443 ASSAY THYROID STIM HORMONE: CPT

## 2021-01-15 PROCEDURE — 80053 COMPREHEN METABOLIC PANEL: CPT

## 2021-01-15 PROCEDURE — 85025 COMPLETE CBC W/AUTO DIFF WBC: CPT

## 2021-02-10 ENCOUNTER — OFFICE VISIT (OUTPATIENT)
Dept: FAMILY MEDICINE CLINIC | Facility: CLINIC | Age: 21
End: 2021-02-10

## 2021-02-10 VITALS
DIASTOLIC BLOOD PRESSURE: 80 MMHG | OXYGEN SATURATION: 97 % | HEART RATE: 91 BPM | SYSTOLIC BLOOD PRESSURE: 116 MMHG | RESPIRATION RATE: 20 BRPM | TEMPERATURE: 97.2 F | HEIGHT: 63 IN | BODY MASS INDEX: 27.85 KG/M2 | WEIGHT: 157.2 LBS

## 2021-02-10 DIAGNOSIS — Z30.09 BIRTH CONTROL COUNSELING: ICD-10-CM

## 2021-02-10 DIAGNOSIS — Z13.31 DEPRESSION SCREEN: ICD-10-CM

## 2021-02-10 DIAGNOSIS — Z00.00 ANNUAL PHYSICAL EXAM: Primary | ICD-10-CM

## 2021-02-10 DIAGNOSIS — I37.0 NONRHEUMATIC PULMONARY VALVE STENOSIS: ICD-10-CM

## 2021-02-10 DIAGNOSIS — D36.9 DERMOID CYST: ICD-10-CM

## 2021-02-10 PROCEDURE — 1036F TOBACCO NON-USER: CPT | Performed by: PHYSICIAN ASSISTANT

## 2021-02-10 PROCEDURE — 99395 PREV VISIT EST AGE 18-39: CPT | Performed by: PHYSICIAN ASSISTANT

## 2021-02-10 PROCEDURE — 3725F SCREEN DEPRESSION PERFORMED: CPT | Performed by: PHYSICIAN ASSISTANT

## 2021-02-10 NOTE — PATIENT INSTRUCTIONS
[address]   [address] [place] [address]   DaphneTate phelpsCmmaru    - please call to make an appointment  Wellness Visit for Adults   AMBULATORY CARE:   A wellness visit  is when you see your healthcare provider to get screened for health problems  Your healthcare provider will also give you advice on how to stay healthy  Write down your questions so you remember to ask them  Ask your healthcare provider how often you should have a wellness visit  What happens at a wellness visit:  Your healthcare provider will ask about your health, and your family history of health problems  This includes high blood pressure, heart disease, and cancer  He or she will ask if you have symptoms that concern you, if you smoke, and about your mood  You may also be asked about your intake of medicines, supplements, food, and alcohol  Any of the following may be done:  · Your weight  will be checked  Your height may also be checked so your body mass index (BMI) can be calculated  Your BMI shows if you are at a healthy weight  · Your blood pressure  and heart rate will be checked  Your temperature may also be checked  · Blood and urine tests  may be done  Blood tests may be done to check your cholesterol levels  Abnormal cholesterol levels increase your risk for heart disease and stroke  You may also need a blood or urine test to check for diabetes if you are at increased risk  Urine tests may be done to look for signs of an infection or kidney disease  · A physical exam  includes checking your heartbeat and lungs with a stethoscope  Your healthcare provider may also check your skin to look for sun damage  · Screening tests  may be recommended  A screening test is done to check for diseases that may not cause symptoms  The screening tests you may need depend on your age, gender, family history, and lifestyle habits   For example, colorectal screening may be recommended if you are 48years old or older  Screening tests you need if you are a woman:   · A Pap smear  is used to screen for cervical cancer  Pap smears are usually done every 3 to 5 years depending on your age  You may need them more often if you have had abnormal Pap smear test results in the past  Ask your healthcare provider how often you should have a Pap smear  · A mammogram  is an x-ray of your breasts to screen for breast cancer  Experts recommend mammograms every 2 years starting at age 48 years  You may need a mammogram at age 52 years or younger if you have an increased risk for breast cancer  Talk to your healthcare provider about when you should start having mammograms and how often you need them  Vaccines you may need:   · Get an influenza vaccine  every year  The influenza vaccine protects you from the flu  Several types of viruses cause the flu  The viruses change over time, so new vaccines are made each year  · Get a tetanus-diphtheria (Td) booster vaccine  every 10 years  This vaccine protects you against tetanus and diphtheria  Tetanus is a severe infection that may cause painful muscle spasms and lockjaw  Diphtheria is a severe bacterial infection that causes a thick covering in the back of your mouth and throat  · Get a human papillomavirus (HPV) vaccine  if you are female and aged 23 to 32 or male 23 to 24 and never received it  This vaccine protects you from HPV infection  HPV is the most common infection spread by sexual contact  HPV may also cause vaginal, penile, and anal cancers  · Get a pneumococcal vaccine  if you are aged 72 years or older  The pneumococcal vaccine is an injection given to protect you from pneumococcal disease  Pneumococcal disease is an infection caused by pneumococcal bacteria  The infection may cause pneumonia, meningitis, or an ear infection  · Get a shingles vaccine  if you are 60 or older, even if you have had shingles before   The shingles vaccine is an injection to protect you from the varicella-zoster virus  This is the same virus that causes chickenpox  Shingles is a painful rash that develops in people who had chickenpox or have been exposed to the virus  How to eat healthy:  My Plate is a model for planning healthy meals  It shows the types and amounts of foods that should go on your plate  Fruits and vegetables make up about half of your plate, and grains and protein make up the other half  A serving of dairy is included on the side of your plate  The amount of calories and serving sizes you need depends on your age, gender, weight, and height  Examples of healthy foods are listed below:  · Eat a variety of vegetables  such as dark green, red, and orange vegetables  You can also include canned vegetables low in sodium (salt) and frozen vegetables without added butter or sauces  · Eat a variety of fresh fruits , canned fruit in 100% juice, frozen fruit, and dried fruit  · Include whole grains  At least half of the grains you eat should be whole grains  Examples include whole-wheat bread, wheat pasta, brown rice, and whole-grain cereals such as oatmeal     · Eat a variety of protein foods such as seafood (fish and shellfish), lean meat, and poultry without skin (turkey and chicken)  Examples of lean meats include pork leg, shoulder, or tenderloin, and beef round, sirloin, tenderloin, and extra lean ground beef  Other protein foods include eggs and egg substitutes, beans, peas, soy products, nuts, and seeds  · Choose low-fat dairy products such as skim or 1% milk or low-fat yogurt, cheese, and cottage cheese  · Limit unhealthy fats  such as butter, hard margarine, and shortening  Exercise:  Exercise at least 30 minutes per day on most days of the week  Some examples of exercise include walking, biking, dancing, and swimming   You can also fit in more physical activity by taking the stairs instead of the elevator or parking farther away from stores  Include muscle strengthening activities 2 days each week  Regular exercise provides many health benefits  It helps you manage your weight, and decreases your risk for type 2 diabetes, heart disease, stroke, and high blood pressure  Exercise can also help improve your mood  Ask your healthcare provider about the best exercise plan for you  General health and safety guidelines:   · Do not smoke  Nicotine and other chemicals in cigarettes and cigars can cause lung damage  Ask your healthcare provider for information if you currently smoke and need help to quit  E-cigarettes or smokeless tobacco still contain nicotine  Talk to your healthcare provider before you use these products  · Limit alcohol  A drink of alcohol is 12 ounces of beer, 5 ounces of wine, or 1½ ounces of liquor  · Lose weight, if needed  Being overweight increases your risk of certain health conditions  These include heart disease, high blood pressure, type 2 diabetes, and certain types of cancer  · Protect your skin  Do not sunbathe or use tanning beds  Use sunscreen with a SPF 15 or higher  Apply sunscreen at least 15 minutes before you go outside  Reapply sunscreen every 2 hours  Wear protective clothing, hats, and sunglasses when you are outside  · Drive safely  Always wear your seatbelt  Make sure everyone in your car wears a seatbelt  A seatbelt can save your life if you are in an accident  Do not use your cell phone when you are driving  This could distract you and cause an accident  Pull over if you need to make a call or send a text message  · Practice safe sex  Use latex condoms if are sexually active and have more than one partner  Your healthcare provider may recommend screening tests for sexually transmitted infections (STIs)  · Wear helmets, lifejackets, and protective gear    Always wear a helmet when you ride a bike or motorcycle, go skiing, or play sports that could cause a head injury  Wear protective equipment when you play sports  Wear a lifejacket when you are on a boat or doing water sports  © Copyright [address] Information is for End User's use only and may not be sold, redistributed or otherwise used for commercial purposes  All illustrations and images included in CareNotes® are the copyrighted property of A D A M , Inc  or [facility] [place]   The above information is an  only  It is not intended as medical advice for individual conditions or treatments  Talk to your doctor, nurse or pharmacist before following any medical regimen to see if it is safe and effective for you

## 2021-02-10 NOTE — PROGRESS NOTES
[address]    NAME: Ramone Pickard  AGE: 23 y o  SEX: female  : 2000     DATE: 2/10/2021     Assessment and Plan:     Problem List Items Addressed This Visit        Endocrine    Dermoid cyst    Relevant Orders    Ambulatory referral to Obstetrics / Gynecology       Cardiovascular and Mediastinum    Nonrheumatic pulmonary valve stenosis      Other Visit Diagnoses     Annual physical exam    -  Primary    Depression screen        Birth control counseling        Relevant Orders    Ambulatory referral to Obstetrics / Gynecology        Nonrheumatic pulmonary valve stenosis   - Stable  Last echocardiogram was in 2019 which showed mild pulmonary stenosis with peak gradient 22 mmHg  - Patient will need repeat echo and EKG in 5 years (2024) or earlier if symptomatic  Dermoid cyst/birth control counseling   - Will place updated referral to OBGYN so patient could schedule follow-up appointment  Depression Screening Follow-up Plan: Patient's depression screening was positive with a PHQ-2 score of 0  Their PHQ-9 score was not indicated  Clinically patient does not have depression  No treatment is required  Immunizations and preventive care screenings were discussed with patient today  Appropriate education was printed on patient's after visit summary  Counseling:  Alcohol/drug use: discussed moderation in alcohol intake, the recommendations for healthy alcohol use, and avoidance of illicit drug use  Dental Health: discussed importance of regular tooth brushing, flossing, and dental visits  Injury prevention: discussed safety/seat belts, safety helmets, smoke detectors, carbon dioxide detectors, and smoking near bedding or upholstery    Sexual health: discussed sexually transmitted diseases, partner selection, use of condoms, avoidance of unintended pregnancy, and contraceptive alternatives  · Exercise: the importance of regular exercise/physical activity was discussed  Recommend exercise 3-5 times per week for at least 30 minutes  Return in about 1 year (around 2/10/2022) for Annual physical      Chief Complaint:     Chief Complaint   Patient presents with    Annual Exam     PE 19 y/o,as per pt states she has been having bumps on her legs  History of Present Illness:     Adult Annual Physical   Patient here for a comprehensive physical exam   Patient was last seen in office on 01/13/2021 due to dizziness  Patient was prescribed meclizine  Patient notes she did try taking the medication once or twice, but it made her drowsy, so she stopped taking it  Patient notes overall her dizziness has improved  Patient notes she has been trying to drink more water throughout the day and that has been helping  Nonrheumatic pulmonary valve stenosis: Per patient, this is congenital  Patient denies any chest pain, but notes sometimes she will become short of breath more easily when exerting herself  Patient notes occasionally she will experience palpitations but not that often  Patient had her last echocardiogram in August 2019  Diet and Physical Activity  · Diet/Nutrition: well balanced diet, limited junk food, low calorie diet and is trying to lose weight and eat healthier  Patient notes she has more energy and overall feels better now that she is eating healthier      · Exercise: no formal exercise and but 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         Depression Screening  PHQ-9 Depression Screening    PHQ-9:   Frequency of the following problems over the past two weeks:      Little interest or pleasure in doing things: 0 - not at all  Feeling down, depressed, or hopeless: 0 - not at all  PHQ-2 Score: 0       General Health  · Sleep: sleeps well  · Hearing: normal - bilateral   · Vision: no vision problems  · Dental: regular dental visits  /GYN Health  · Last menstrual period: 1/29/2021; menses are regular, occur once monthly  Patient notes sometimes she will experience abdominal cramping around the times of her period  ·   Patient does have history of ruptured ovarian cyst and dermoid cyst   Patient would like to follow-up with OBGYN at [address]  Patient is interested in starting birth control, but is not sure which method she would prefer  · Contraceptive method: barrier methods  Review of Systems:     Review of Systems   Constitutional: Negative for chills, fatigue and fever  HENT: Negative for congestion and sore throat  Eyes: Negative for pain and visual disturbance  Respiratory: Negative for cough, chest tightness and shortness of breath  Cardiovascular: Negative for chest pain and palpitations  Gastrointestinal: Negative for abdominal pain, constipation, diarrhea, nausea and vomiting  Genitourinary: Negative for difficulty urinating and dysuria  Musculoskeletal: Negative for arthralgias  Skin: Negative for rash  Neurological: Negative for dizziness and headaches  Psychiatric/Behavioral: The patient is not nervous/anxious         Past Medical History:     Past Medical History:   Diagnosis Date    Heart murmur     Right ovarian cyst 6/2/2020      Past Surgical History:     Past Surgical History:   Procedure Laterality Date    TONSILLECTOMY        Social History:     Social History     Socioeconomic History    Marital status: Single     Spouse name: None    Number of children: None    Years of education: None    Highest education level: None   Occupational History    None   Social Needs    Financial resource strain: None    Food insecurity     Worry: None     Inability: None    Transportation needs     Medical: None     Non-medical: None   Tobacco Use    Smoking status: Never Smoker    Smokeless tobacco: Never Used   Substance and Sexual Activity    Alcohol use: Not Currently     Frequency: Never    Drug use: Never    Sexual activity: Yes     Partners: Male     Birth control/protection: Condom Male   Lifestyle    Physical activity     Days per week: None     Minutes per session: None    Stress: None   Relationships    Social connections     Talks on phone: None     Gets together: None     Attends Zoroastrian service: None     Active member of club or organization: None     Attends meetings of clubs or organizations: None     Relationship status: None    Intimate partner violence     Fear of current or ex partner: None     Emotionally abused: None     Physically abused: None     Forced sexual activity: None   Other Topics Concern    None   Social History Narrative    None      Family History:     Family History   Problem Relation Age of Onset    Diabetes Maternal Grandmother     Coronary artery disease Maternal Grandfather       Current Medications:     Current Outpatient Medications   Medication Sig Dispense Refill    BIOTIN PO Take by mouth daily       No current facility-administered medications for this visit  Allergies:     No Known Allergies   Physical Exam:     /80 (BP Location: Left arm, Patient Position: Sitting, Cuff Size: Standard)   Pulse 91   Temp (!) 97 2 °F (36 2 °C) (Temporal)   Resp 20   Ht 5' 3" (1 6 m)   Wt 71 3 kg (157 lb 3 2 oz)   LMP 01/29/2021   SpO2 97%   BMI 27 85 kg/m²     Physical Exam  Vitals signs and nursing note reviewed  Constitutional:       Appearance: She is well-developed  HENT:      Head: Normocephalic and atraumatic  Right Ear: Tympanic membrane, ear canal and external ear normal       Left Ear: Tympanic membrane, ear canal and external ear normal       Nose: Nose normal       Mouth/Throat:      Pharynx: Uvula midline  Eyes:      Extraocular Movements: Extraocular movements intact  Conjunctiva/sclera: Conjunctivae normal       Pupils: Pupils are equal, round, and reactive to light     Neck:      Musculoskeletal: Normal range of motion and neck supple  Cardiovascular:      Rate and Rhythm: Normal rate and regular rhythm  Heart sounds: Normal heart sounds  No murmur  Pulmonary:      Effort: Pulmonary effort is normal       Breath sounds: Normal breath sounds  No wheezing  Abdominal:      General: Bowel sounds are normal       Palpations: Abdomen is soft  Tenderness: There is no abdominal tenderness  Musculoskeletal: Normal range of motion  General: No swelling  Skin:     General: Skin is warm and dry  Neurological:      Mental Status: She is alert and oriented to person, place, and time     Psychiatric:         Mood and Affect: Mood normal          Speech: Speech normal          Behavior: Behavior normal          HARISH Jacob

## 2021-02-24 ENCOUNTER — ANNUAL EXAM (OUTPATIENT)
Dept: OBGYN CLINIC | Facility: MEDICAL CENTER | Age: 21
End: 2021-02-24
Payer: COMMERCIAL

## 2021-02-24 VITALS
WEIGHT: 154 LBS | HEIGHT: 63 IN | DIASTOLIC BLOOD PRESSURE: 80 MMHG | BODY MASS INDEX: 27.29 KG/M2 | SYSTOLIC BLOOD PRESSURE: 122 MMHG

## 2021-02-24 DIAGNOSIS — D27.0 DERMOID CYST OF RIGHT OVARY: ICD-10-CM

## 2021-02-24 DIAGNOSIS — D36.9 DERMOID CYST: ICD-10-CM

## 2021-02-24 DIAGNOSIS — Z01.419 ENCOUNTER FOR GYNECOLOGICAL EXAMINATION: Primary | ICD-10-CM

## 2021-02-24 DIAGNOSIS — Z30.09 BIRTH CONTROL COUNSELING: ICD-10-CM

## 2021-02-24 PROCEDURE — 99395 PREV VISIT EST AGE 18-39: CPT | Performed by: OBSTETRICS & GYNECOLOGY

## 2021-02-24 PROCEDURE — 1036F TOBACCO NON-USER: CPT | Performed by: OBSTETRICS & GYNECOLOGY

## 2021-02-24 PROCEDURE — G0145 SCR C/V CYTO,THINLAYER,RESCR: HCPCS | Performed by: OBSTETRICS & GYNECOLOGY

## 2021-02-26 ENCOUNTER — TELEPHONE (OUTPATIENT)
Dept: OBGYN CLINIC | Facility: MEDICAL CENTER | Age: 21
End: 2021-02-26

## 2021-02-26 PROBLEM — D36.9 DERMOID CYST: Status: RESOLVED | Noted: 2020-06-17 | Resolved: 2021-02-26

## 2021-02-26 PROBLEM — D27.0 DERMOID CYST OF RIGHT OVARY: Status: ACTIVE | Noted: 2021-02-26

## 2021-02-26 PROBLEM — R10.30 LOWER ABDOMINAL PAIN: Status: RESOLVED | Noted: 2020-02-25 | Resolved: 2021-02-26

## 2021-02-26 NOTE — TELEPHONE ENCOUNTER
----- Message from Domo Liu sent at 2/26/2021 11:32 AM EST -----  I contacted pt's insurance  Effective 1/1/20 and active  Pt is covered for insertion, removal and device at 100%  No PA needed  Lesly METZGER   Ref# [id number]    Lmovm for pt to cb           ----- Message -----  From: Ciara Cobian MD  Sent: 2/24/2021   9:47 AM EST  To: Domo Liu    IUD [facility]

## 2021-02-26 NOTE — TELEPHONE ENCOUNTER
Patient called advised that the [facility] IUD is covered 100% will call back to schedule insertion

## 2021-02-27 NOTE — PROGRESS NOTES
A/P    1  Annual exam    Last PAP never    Next due today   Scheduling of pap discussed in detail     1  Encounter for gynecological examination  Liquid-based pap, screening    refill of birth control    2  Dermoid cyst of right ovary  US pelvis complete w transvaginal         20 y o ,Patient's last menstrual period was 01/29/2021  C/O she has no complaints   She has no pain   Wants refill of ocp       Past medical / social / surgical / family history reviewed and updated   Medication and allergies discussed in detail and updated     Review of Systems - History obtained from chart review and the patient  General ROS: negative  Psychological ROS: negative  Ophthalmic ROS: negative  ENT ROS: negative  Allergy and Immunology ROS: negative  Hematological and Lymphatic ROS: negative  Endocrine ROS: negative  Breast ROS: negative for breast lumps  Respiratory ROS: no cough, shortness of breath, or wheezing  Cardiovascular ROS: no chest pain or dyspnea on exertion  Gastrointestinal ROS: no abdominal pain, change in bowel habits, or black or bloody stools  Genito-Urinary ROS: no dysuria, trouble voiding, or hematuria  Musculoskeletal ROS: negative  Neurological ROS: no TIA or stroke symptoms  Dermatological ROS: negative        Physical Exam  Vitals signs reviewed  Constitutional:       Appearance: She is well-developed  Neck:      Musculoskeletal: Normal range of motion  Thyroid: No thyromegaly  Cardiovascular:      Rate and Rhythm: Normal rate  Heart sounds: Normal heart sounds  Pulmonary:      Effort: Pulmonary effort is normal  No accessory muscle usage or respiratory distress  Chest:      Chest wall: No tenderness  Breasts: Breasts are symmetrical          Right: No inverted nipple, mass or tenderness  Left: No inverted nipple, mass or tenderness  Abdominal:      General: There is no distension  Palpations: Abdomen is soft  Tenderness: There is no abdominal tenderness  There is no guarding or rebound  Genitourinary:     Exam position: Supine  Labia:         Right: No rash, tenderness, lesion or injury  Left: No rash, tenderness, lesion or injury  Vagina: Normal  No foreign body  No vaginal discharge or bleeding  Cervix: No cervical motion tenderness or discharge  Uterus: Not enlarged and not fixed  Adnexa:         Right: No mass, tenderness or fullness  Left: No mass, tenderness or fullness  Rectum: No external hemorrhoid  Lymphadenopathy:      Cervical: No cervical adenopathy  Upper Body:      Right upper body: No supraclavicular adenopathy  Left upper body: No supraclavicular adenopathy  Neurological:      Mental Status: She is alert and oriented to person, place, and time  Psychiatric:         Speech: Speech normal          Behavior: Behavior normal          Thought Content:  Thought content normal          Judgment: Judgment normal

## 2021-03-01 LAB
LAB AP GYN PRIMARY INTERPRETATION: NORMAL
Lab: NORMAL

## 2021-03-04 ENCOUNTER — TELEPHONE (OUTPATIENT)
Dept: OBGYN CLINIC | Facility: MEDICAL CENTER | Age: 21
End: 2021-03-04

## 2021-03-04 NOTE — TELEPHONE ENCOUNTER
----- Message from Ping Price sent at 3/4/2021  9:47 AM EST -----  Lmovm 2x   ----- Message -----  From: Ping Price  Sent: 2/26/2021  11:32 AM EST  To: Ping Price    I contacted pt's insurance  Effective 1/1/20 and active  Pt is covered for insertion, removal and device at 100%  No PA needed  Lesly METZGER   Ref# [id number]    Lmovm for pt to cb           ----- Message -----  From: Obdulia Lima MD  Sent: 2/24/2021   9:47 AM EST  To: Ping Price    IUD [facility]

## 2021-03-18 ENCOUNTER — TELEPHONE (OUTPATIENT)
Dept: OBGYN CLINIC | Facility: MEDICAL CENTER | Age: 21
End: 2021-03-18

## 2021-03-18 NOTE — TELEPHONE ENCOUNTER
Pt called office and stated due to her irregular cycles, she is unsure of when she is supposed to schedule her IUD insertion  Pt was told she should be on her menstrual cycle for the procedure  Does pt need to wait until her cycle to schedule or can she schedule for procedure when she doesn't have her cycle? Pt stated she is unsure of when she will get her period  Please review

## 2021-03-25 DIAGNOSIS — Z23 ENCOUNTER FOR IMMUNIZATION: ICD-10-CM

## 2021-03-31 ENCOUNTER — PROCEDURE VISIT (OUTPATIENT)
Dept: OBGYN CLINIC | Facility: MEDICAL CENTER | Age: 21
End: 2021-03-31
Payer: COMMERCIAL

## 2021-03-31 VITALS
HEIGHT: 63 IN | WEIGHT: 155 LBS | DIASTOLIC BLOOD PRESSURE: 80 MMHG | BODY MASS INDEX: 27.46 KG/M2 | SYSTOLIC BLOOD PRESSURE: 120 MMHG

## 2021-03-31 DIAGNOSIS — Z30.430 ENCOUNTER FOR IUD INSERTION: Primary | ICD-10-CM

## 2021-03-31 PROCEDURE — 3008F BODY MASS INDEX DOCD: CPT | Performed by: PHYSICIAN ASSISTANT

## 2021-03-31 PROCEDURE — 3008F BODY MASS INDEX DOCD: CPT | Performed by: OBSTETRICS & GYNECOLOGY

## 2021-04-08 PROCEDURE — 58300 INSERT INTRAUTERINE DEVICE: CPT | Performed by: OBSTETRICS & GYNECOLOGY

## 2021-04-08 NOTE — PROGRESS NOTES
Iud insertions    Date/Time: 4/8/2021 12:14 PM  Performed by: Teofilo Rouse MD  Authorized by: Teofilo Rouse MD   [facility] Protocol:  Consent: Verbal consent obtained  Written consent obtained  Risks and benefits: risks, benefits and alternatives were discussed  Consent given by: patient  Time out: Immediately prior to procedure a "time out" was called to verify the correct patient, procedure, equipment, support staff and site/side marked as required  Patient understanding: patient states understanding of the procedure being performed  Patient consent: the patient's understanding of the procedure matches consent given  Procedure consent: procedure consent matches procedure scheduled  Relevant documents: relevant documents present and verified  Test results: test results available and properly labeled  Site marked: the operative site was not marked  Radiology Images displayed and confirmed   If images not available, report reviewed: imaging studies not available  Required items: required blood products, implants, devices, and special equipment available  Patient identity confirmed: verbally with patient        Procedure:     Pelvic exam performed: yes      Negative GC/chlamydia test: no      Negative urine pregnancy test: yes      Negative serum pregnancy test: no      Cervix cleaned and prepped: yes      Speculum placed in vagina: yes      Tenaculum applied to cervix: yes      Uterus sounded: yes      Uterus sound depth (cm):  8    IUD inserted with no complications: yes      IUD type:  Latoya    Strings trimmed: yes    Post-procedure:     Patient tolerated procedure well: yes      Patient will follow up after next period: yes

## 2021-04-19 ENCOUNTER — TELEPHONE (OUTPATIENT)
Dept: OBGYN CLINIC | Facility: MEDICAL CENTER | Age: 21
End: 2021-04-19

## 2021-04-19 NOTE — TELEPHONE ENCOUNTER
Patient called stating that she had an IUD inserted three weeks ago and she has been bleeding ongoing ever since the inserting   She would like to speak with someone about this to find out if it is normal

## 2021-04-21 ENCOUNTER — OFFICE VISIT (OUTPATIENT)
Dept: OBGYN CLINIC | Facility: MEDICAL CENTER | Age: 21
End: 2021-04-21
Payer: COMMERCIAL

## 2021-04-21 VITALS
BODY MASS INDEX: 28 KG/M2 | DIASTOLIC BLOOD PRESSURE: 80 MMHG | SYSTOLIC BLOOD PRESSURE: 120 MMHG | WEIGHT: 158 LBS | HEIGHT: 63 IN

## 2021-04-21 DIAGNOSIS — D27.0 DERMOID CYST OF RIGHT OVARY: Primary | ICD-10-CM

## 2021-04-21 DIAGNOSIS — Z30.431 IUD CHECK UP: ICD-10-CM

## 2021-04-21 PROCEDURE — 3008F BODY MASS INDEX DOCD: CPT | Performed by: OBSTETRICS & GYNECOLOGY

## 2021-04-21 PROCEDURE — 99213 OFFICE O/P EST LOW 20 MIN: CPT | Performed by: OBSTETRICS & GYNECOLOGY

## 2021-04-21 NOTE — PROGRESS NOTES
Problem List Items Addressed This Visit        Endocrine    Dermoid cyst of right ovary - Primary     She reports that she has no pain or concern for they cyst  We went over indications for surgery again   Torsion pain ect    She will cont to get yearly sonograms for stability             Other    IUD check up     Still having some bleeding and wants to make sure it normal   Pelvic exam - strings visualized

## 2021-04-28 ENCOUNTER — HOSPITAL ENCOUNTER (OUTPATIENT)
Dept: ULTRASOUND IMAGING | Facility: HOSPITAL | Age: 21
Discharge: HOME | End: 2021-04-28
Payer: COMMERCIAL

## 2021-04-28 DIAGNOSIS — D27.0 DERMOID CYST OF RIGHT OVARY: ICD-10-CM

## 2021-04-28 PROCEDURE — 76856 US EXAM PELVIC COMPLETE: CPT

## 2021-04-28 PROCEDURE — 76830 TRANSVAGINAL US NON-OB: CPT

## 2021-05-03 PROBLEM — Z30.431 IUD CHECK UP: Status: ACTIVE | Noted: 2021-05-03

## 2021-05-03 NOTE — ASSESSMENT & PLAN NOTE
She reports that she has no pain or concern for they cyst  We went over indications for surgery again   Torsion pain ect    She will cont to get yearly sonograms for stability

## 2021-05-19 ENCOUNTER — OFFICE VISIT (OUTPATIENT)
Dept: FAMILY MEDICINE CLINIC | Facility: CLINIC | Age: 21
End: 2021-05-19

## 2021-05-19 VITALS
OXYGEN SATURATION: 98 % | RESPIRATION RATE: 18 BRPM | SYSTOLIC BLOOD PRESSURE: 130 MMHG | HEART RATE: 89 BPM | BODY MASS INDEX: 28.58 KG/M2 | TEMPERATURE: 98.2 F | HEIGHT: 63 IN | DIASTOLIC BLOOD PRESSURE: 82 MMHG | WEIGHT: 161.3 LBS

## 2021-05-19 DIAGNOSIS — M25.532 LEFT WRIST PAIN: Primary | ICD-10-CM

## 2021-05-19 PROCEDURE — 1036F TOBACCO NON-USER: CPT | Performed by: PHYSICIAN ASSISTANT

## 2021-05-19 PROCEDURE — 99213 OFFICE O/P EST LOW 20 MIN: CPT | Performed by: PHYSICIAN ASSISTANT

## 2021-05-19 PROCEDURE — 3008F BODY MASS INDEX DOCD: CPT | Performed by: OBSTETRICS & GYNECOLOGY

## 2021-05-19 PROCEDURE — 3008F BODY MASS INDEX DOCD: CPT | Performed by: PHYSICIAN ASSISTANT

## 2021-05-19 NOTE — PROGRESS NOTES
Assessment/Plan:    Left wrist pain  - Pain has been persistent when grabbing objects or making a fist   Patient is now having decreased strength as well  Patient is requesting referral to Orthopedics  Will place referral today  - Advised patient to try OTC ulnar wrist splint in the meantime  Diagnoses and all orders for this visit:    Left wrist pain  -     Ambulatory referral to Orthopedic Surgery; Future          All of patients questions were answered  Patient understands and agrees with the above plan  Return if symptoms worsen or fail to improve  Domenic Doyle PA-C  05/19/21  [facility]           Subjective:     Patient ID: Shorty Silva  is a 23 y o  female  has a past medical history of Heart murmur and Right ovarian cyst  who presents today in office for Left wrist pain  - Patient is a 23 y o  female who presents today for  Left wrist pain  Patient notes for the past month she has been experiencing some pain of her left wrist   Patient denies any injury or trauma to the area  Patient notes she that she slept on her hand wrong and that was causing the pain  However, the pain has been persistent  Patient notes the pain occurs when she is holding something or making a fist   Patient denies any numbness or tingling of the left upper extremity  Patient notes the other day she was holding a bag of groceries and she dropped the bag because she lost strength from her wrist   Patient is right-handed  Patient notes she is experiencing the most amount of pain on the pinky side of her left wrist       The following portions of the patient's history were reviewed and updated as appropriate: allergies, current medications, past family history, past medical history, past social history, past surgical history and problem list         Review of Systems   Constitutional: Negative for chills, fatigue and fever  HENT: Negative for congestion and sore throat      Eyes: Negative for pain and visual disturbance  Respiratory: Negative for cough, chest tightness and shortness of breath  Cardiovascular: Negative for chest pain and palpitations  Gastrointestinal: Negative for abdominal pain, constipation, diarrhea, nausea and vomiting  Genitourinary: Negative for difficulty urinating and dysuria  Musculoskeletal: Positive for arthralgias (left wrist pain)  Skin: Negative for rash  Neurological: Negative for dizziness and headaches  Psychiatric/Behavioral: The patient is not nervous/anxious  Objective:   Vitals:    05/19/21 1448   BP: 130/82   BP Location: Right arm   Patient Position: Sitting   Cuff Size: Standard   Pulse: 89   Resp: 18   Temp: 98 2 °F (36 8 °C)   TempSrc: Temporal   SpO2: 98%   Weight: 73 2 kg (161 lb 4 8 oz)   Height: 5' 3" (1 6 m)         Physical Exam  Vitals signs and nursing note reviewed  Constitutional:       Appearance: She is well-developed  HENT:      Head: Normocephalic and atraumatic  Right Ear: External ear normal       Left Ear: External ear normal       Nose: Nose normal    Eyes:      Conjunctiva/sclera: Conjunctivae normal    Neck:      Musculoskeletal: Normal range of motion and neck supple  Cardiovascular:      Rate and Rhythm: Normal rate and regular rhythm  Heart sounds: Normal heart sounds  No murmur  Pulmonary:      Effort: Pulmonary effort is normal       Breath sounds: Normal breath sounds  No wheezing  Musculoskeletal:      Right wrist: She exhibits normal range of motion, no tenderness, no bony tenderness and no swelling  Left wrist: She exhibits tenderness  She exhibits normal range of motion, no bony tenderness and no swelling  Right hand: She exhibits normal range of motion and no tenderness  Normal sensation noted  Normal strength noted  Left hand: She exhibits normal range of motion and no tenderness  Normal sensation noted  Decreased strength (decreased  strength) noted  Hands:    Skin:     General: Skin is warm and dry  Neurological:      Mental Status: She is alert and oriented to person, place, and time     Psychiatric:         Mood and Affect: Mood normal          Speech: Speech normal          Behavior: Behavior normal

## 2021-06-18 ENCOUNTER — OFFICE VISIT (OUTPATIENT)
Dept: OBGYN CLINIC | Facility: MEDICAL CENTER | Age: 21
End: 2021-06-18
Payer: COMMERCIAL

## 2021-06-18 DIAGNOSIS — L73.9 FOLLICULITIS: Primary | ICD-10-CM

## 2021-06-18 PROCEDURE — 99212 OFFICE O/P EST SF 10 MIN: CPT | Performed by: OBSTETRICS & GYNECOLOGY

## 2021-06-18 NOTE — PROGRESS NOTES
IUD placed 3/31/2021- Latoya - seen 4/21/21- with some irregular bleeding and found to be ok     Today she reports - just occasional spotting mild nothing major and better then prior    Today reports a boil like bump that sometimes pops and bleeds  On exam was folliculitis laced and pus was drained little  Advised not to squeeze it and allow to pop on its own

## 2021-06-25 ENCOUNTER — OFFICE VISIT (OUTPATIENT)
Dept: OBGYN CLINIC | Facility: MEDICAL CENTER | Age: 21
End: 2021-06-25
Payer: COMMERCIAL

## 2021-06-25 ENCOUNTER — APPOINTMENT (OUTPATIENT)
Dept: RADIOLOGY | Facility: MEDICAL CENTER | Age: 21
End: 2021-06-25
Payer: COMMERCIAL

## 2021-06-25 VITALS
DIASTOLIC BLOOD PRESSURE: 74 MMHG | BODY MASS INDEX: 27.82 KG/M2 | HEIGHT: 63 IN | HEART RATE: 86 BPM | SYSTOLIC BLOOD PRESSURE: 146 MMHG | WEIGHT: 157 LBS

## 2021-06-25 DIAGNOSIS — M25.532 CHRONIC PAIN OF LEFT WRIST: ICD-10-CM

## 2021-06-25 DIAGNOSIS — M65.832 EXTENSOR TENOSYNOVITIS OF LEFT WRIST: ICD-10-CM

## 2021-06-25 DIAGNOSIS — G89.29 CHRONIC PAIN OF LEFT WRIST: Primary | ICD-10-CM

## 2021-06-25 DIAGNOSIS — M25.532 CHRONIC PAIN OF LEFT WRIST: Primary | ICD-10-CM

## 2021-06-25 DIAGNOSIS — G89.29 CHRONIC PAIN OF LEFT WRIST: ICD-10-CM

## 2021-06-25 PROCEDURE — 3008F BODY MASS INDEX DOCD: CPT | Performed by: PHYSICIAN ASSISTANT

## 2021-06-25 PROCEDURE — 1036F TOBACCO NON-USER: CPT | Performed by: STUDENT IN AN ORGANIZED HEALTH CARE EDUCATION/TRAINING PROGRAM

## 2021-06-25 PROCEDURE — 99244 OFF/OP CNSLTJ NEW/EST MOD 40: CPT | Performed by: STUDENT IN AN ORGANIZED HEALTH CARE EDUCATION/TRAINING PROGRAM

## 2021-06-25 PROCEDURE — 73110 X-RAY EXAM OF WRIST: CPT

## 2021-06-25 PROCEDURE — 3008F BODY MASS INDEX DOCD: CPT | Performed by: STUDENT IN AN ORGANIZED HEALTH CARE EDUCATION/TRAINING PROGRAM

## 2021-06-25 PROCEDURE — 20551 NJX 1 TENDON ORIGIN/INSJ: CPT | Performed by: STUDENT IN AN ORGANIZED HEALTH CARE EDUCATION/TRAINING PROGRAM

## 2021-06-25 RX ADMIN — LIDOCAINE HYDROCHLORIDE 0.5 ML: 10 INJECTION, SOLUTION INFILTRATION; PERINEURAL at 13:15

## 2021-06-25 RX ADMIN — TRIAMCINOLONE ACETONIDE 20 MG: 40 INJECTION, SUSPENSION INTRA-ARTICULAR; INTRAMUSCULAR at 13:15

## 2021-06-25 NOTE — LETTER
June 25, 2021     Patient: Steffany Garrido   YOB: 2000   Date of Visit: 6/25/2021       To Whom it May Concern:    Steffany Garrido is under my professional care  She was seen in my office on 6/25/2021  She is restricted from lifting no more then 2 pounds with her left arm for 2 weeks, then can return to work duties without restrictions on 7/10/2021  If you have any questions or concerns, please don't hesitate to call           Sincerely,          Lindsey Wilson MD        CC: Steffany Garrido

## 2021-06-25 NOTE — PROGRESS NOTES
1  Chronic pain of left wrist  XR wrist 3+ vw left    Cock Up Wrist Splint    Hand/upper extremity injection   2  Extensor tenosynovitis of left wrist  Cock Up Wrist Splint    Hand/upper extremity injection     Orders Placed This Encounter   Procedures    Hand/upper extremity injection    Cock Up Wrist Splint    XR wrist 3+ vw left        Imaging Studies (I personally reviewed images in PACS and report): 1  X-ray left wrist 06/25/2021: No acute osseous abnormalities    IMPRESSION:  Chronic left ulnar sided wrist pain secondary to ECU tenosynovitis  - Denies a precipitating injury; patient attributes pain to sleeping on her wrist "awkwardly" and having persistent ulnar sided wrist pain that is aggravated with activity  No erythema or S/S of infection  Consultation evaluation    PLAN:  I have discussed with the patient the pathophysiology of this diagnosis and reviewed how the examination correlates with this diagnosis  Treatment options were discussed at length and after discussing these treatment options, the patient elected to receive an ultrasound guided injection of her ECU tendon sheath with cortisone (as per procedure below)  Patient prescribed wrist brace after procedure, and I recommended it's use during the day/activities/work for 1-2 weeks  In additon, I recommended not to lift more than 2 lbs with her left hand at this time  I provided home ROM exercises for her left wrist to prevent stiffness  I counseled that she can take NSAIDs/ acetaminophen, icing 20 mins on/off as needed as well for further pain control  Return in about 4 weeks (around 7/23/2021)  CHIEF COMPLAINT:  Left wrist pain    HPI:  Estefanía Walker is a 23 y o  female  who presents in regards to referral from her PCP, Jose Gaitan, for       Visit 06/25/2021 :  Initial evaluation left wrist pain:  Patient reports has been ongoing issue for the past couple months without a precipitating injury    However, she attributes it to "sleeping on her left arm awkwardly and "and waking up with the pain over the ulnar aspect of her left wrist   Pain started mild but progressively worsened to a point where she had difficulty with left wrist range of motion and grasping items  Pain is mainly located over the ulnar aspect of her wrist and can radiate up to her ulnar aspect of her hand as well as the ulnar aspect of her forearm  She reports intermittent swelling but denies bruising, numbness / tingling  She has never had symptoms like this before  She bought an OTC wrist brace which provides some relief due to the compression, but does not prevent aggravation of her pain when grasping items  She denies taking NSAIDs/ acetaminophen for this issue  She denies any neck pain  Review of Systems   Constitutional: Negative for chills, fatigue and fever  Respiratory: Negative for cough and shortness of breath  Gastrointestinal: Negative for abdominal pain, nausea and vomiting  Musculoskeletal:        As per HPI   Skin: Negative for rash and wound     Neurological:        As per HPI         Medical, Surgical, Family, and Social History    Past Medical History:   Diagnosis Date    Heart murmur     Right ovarian cyst 6/2/2020     Past Surgical History:   Procedure Laterality Date    TONSILLECTOMY       Social History   Social History     Substance and Sexual Activity   Alcohol Use Not Currently     Social History     Substance and Sexual Activity   Drug Use Never     Social History     Tobacco Use   Smoking Status Never Smoker   Smokeless Tobacco Never Used     Family History   Problem Relation Age of Onset    Diabetes Maternal Grandmother     Coronary artery disease Maternal Grandfather      No Known Allergies       Physical Exam  /74   Pulse 86   Ht 5' 3" (1 6 m)   Wt 71 2 kg (157 lb)   BMI 27 81 kg/m²     Constitutional:  see vital signs  Gen: well-developed, normocephalic/atraumatic, well-groomed  Eyes: No inflammation or discharge of conjunctiva or lids; sclera clear   Pharynx: no inflammation, lesion, or mass of lips  Neck: supple, no masses, non-distended  MSK: no inflammation, lesion, mass, or clubbing of nails and digits except for other than mentioned below  SKIN: no visible rashes or skin lesions  Pulmonary/Chest: Effort normal  No respiratory distress  NEURO: cranial nerves grossly intact  PSYCH:  Alert and oriented to person, place, and time; recent and remote memory intact; mood normal, no depression, anxiety, or agitation, judgment and insight good and intact     Left Hand Exam     Tenderness   The patient is experiencing tenderness in the ulnar area  Range of Motion   Wrist   Extension: 40 (aggravates ulnar wrist pain)   Flexion: 90   Pronation: 90   Supination: 90     Muscle Strength   Wrist extension: 4/5   Wrist flexion: 5/5   :  4/5     Tests   Finkelstein's test: negative    Other   Erythema: absent  Sensation: normal  Pulse: present    Comments:  Pain aggravated during ulnar deviation and wrist extension over ulnar aspect of wrist  Cap refill<2 secs  ROM of fingers/thumb IP/MCP/PIP/DIP intact          On ultrasound evaluation of her left ulnar-sided wrist, there is diffuse swelling surrounding the tendon sheath of the ECU  Hand/upper extremity injection  Universal Protocol:  Consent: Verbal consent obtained  Risks and benefits: risks, benefits and alternatives were discussed  Consent given by: patient  Time out: Immediately prior to procedure a "time out" was called to verify the correct patient, procedure, equipment, support staff and site/side marked as required  Timeout called at: 6/25/2021 11:40 AM   Patient understanding: patient states understanding of the procedure being performed  Radiology Images displayed and confirmed   If images not available, report reviewed: imaging studies available  Required items: required blood products, implants, devices, and special equipment available  Patient identity confirmed: verbally with patient    Supporting Documentation  Indications: pain, therapeutic and diagnostic   Procedure Details  Condition comment: Left ECU tenosynovitis   Preparation: Patient was prepped and draped in the usual sterile fashion  Needle size: 25 G  Ultrasound guidance: yes  Approach: Short axis    Medications administered: 0 5 mL lidocaine 1 %; 20 mg triamcinolone acetonide 40 mg/mL    Patient tolerance: patient tolerated the procedure well with no immediate complications  Dressing:  Sterile dressing applied

## 2021-06-26 RX ORDER — TRIAMCINOLONE ACETONIDE 40 MG/ML
20 INJECTION, SUSPENSION INTRA-ARTICULAR; INTRAMUSCULAR
Status: COMPLETED | OUTPATIENT
Start: 2021-06-25 | End: 2021-06-25

## 2021-06-26 RX ORDER — LIDOCAINE HYDROCHLORIDE 10 MG/ML
0.5 INJECTION, SOLUTION INFILTRATION; PERINEURAL
Status: COMPLETED | OUTPATIENT
Start: 2021-06-25 | End: 2021-06-25

## 2021-09-01 ENCOUNTER — TELEMEDICINE (OUTPATIENT)
Dept: OBGYN CLINIC | Facility: MEDICAL CENTER | Age: 21
End: 2021-09-01
Payer: COMMERCIAL

## 2021-09-01 DIAGNOSIS — N76.0 BV (BACTERIAL VAGINOSIS): Primary | ICD-10-CM

## 2021-09-01 DIAGNOSIS — B96.89 BV (BACTERIAL VAGINOSIS): Primary | ICD-10-CM

## 2021-09-01 PROCEDURE — 99213 OFFICE O/P EST LOW 20 MIN: CPT | Performed by: OBSTETRICS & GYNECOLOGY

## 2021-09-01 RX ORDER — METRONIDAZOLE 500 MG/1
500 TABLET ORAL EVERY 12 HOURS SCHEDULED
Qty: 10 TABLET | Refills: 0 | Status: SHIPPED | OUTPATIENT
Start: 2021-09-01 | End: 2021-09-06

## 2021-09-07 NOTE — PROGRESS NOTES
IUD jacklyn inserted on 3/31/21  Checked by sonogram and in place    Today reports    1   vaginal discharge with an odor  Will try Flagyl and if not better to come and let me know     2   menstrual irregularities   She reports that the cycles are not regular    She has bleeding off and one x 2 weeks only spotting and very light    Does some times have a heavy cycle     Will report back about the cycles over time as they stabilize   Virtual Regular Visit    Verification of patient location:    Patient is located in the following state in which I hold an active license [place]      Assessment/Plan:    Problem List Items Addressed This Visit     None      Visit Diagnoses     BV (bacterial vaginosis)    -  Primary               Reason for visit is   Chief Complaint   Patient presents with    Follow-up        Encounter provider Marisela Marrufo MD    Provider located at [address]  [employer]  [address] [address]      Recent Visits  Date Type Provider Dept   [facility] visits within past 7 days and meeting all other requirements  Future Appointments  No visits were found meeting these conditions  Showing future appointments within next 150 days and meeting all other requirements       The patient was identified by name and date of birth  Rosario Richards was informed that this is a telemedicine visit and that the visit is being conducted through [address] [place] [address] Now and patient was informed that this is a secure, HIPAA-compliant platform  She agrees to proceed     My office door was closed  No one else was in the room  She acknowledged consent and understanding of privacy and security of the video platform  The patient has agreed to participate and understands they can discontinue the visit at any time  Patient is aware this is a billable service       Subjective  Rosario Richards is a 23 y o  female as above   HPI     Past Medical History:   Diagnosis Date    Heart murmur     Right ovarian cyst 6/2/2020       Past Surgical History:   Procedure Laterality Date    TONSILLECTOMY         Current Outpatient Medications   Medication Sig Dispense Refill    BIOTIN PO Take by mouth daily (Patient not taking: Reported on 6/25/2021)       Current Facility-Administered Medications   Medication Dose Route Frequency Provider Last Rate Last Admin    levonorgestrel (CORY) 13 5 mg intrauterine device (IUD)  1 Intra Uterine Device Intrauterine Once Tarun Chakraborty MD            No Known Allergies    Review of Systems    Video Exam    There were no vitals filed for this visit  Physical Exam     I spent 15 minutes directly with the patient during this visit    VIRTUAL VISIT DISCLAIMER      Ishaan Marion verbally agrees to participate in [employer]  Pt is aware that [employer] could be limited without vital signs or the ability to perform a full hands-on physical exam  Jessa Sal understands she or the provider may request at any time to terminate the video visit and request the patient to seek care or treatment in person

## 2021-10-12 ENCOUNTER — OFFICE VISIT (OUTPATIENT)
Dept: OBGYN CLINIC | Facility: MEDICAL CENTER | Age: 21
End: 2021-10-12
Payer: COMMERCIAL

## 2021-10-12 VITALS
SYSTOLIC BLOOD PRESSURE: 120 MMHG | DIASTOLIC BLOOD PRESSURE: 78 MMHG | HEART RATE: 73 BPM | WEIGHT: 157 LBS | HEIGHT: 63 IN | BODY MASS INDEX: 27.82 KG/M2

## 2021-10-12 DIAGNOSIS — M65.832 EXTENSOR TENOSYNOVITIS OF LEFT WRIST: ICD-10-CM

## 2021-10-12 DIAGNOSIS — G89.29 CHRONIC PAIN OF LEFT WRIST: Primary | ICD-10-CM

## 2021-10-12 DIAGNOSIS — M25.532 CHRONIC PAIN OF LEFT WRIST: Primary | ICD-10-CM

## 2021-10-12 PROCEDURE — 1036F TOBACCO NON-USER: CPT | Performed by: STUDENT IN AN ORGANIZED HEALTH CARE EDUCATION/TRAINING PROGRAM

## 2021-10-12 PROCEDURE — 99214 OFFICE O/P EST MOD 30 MIN: CPT | Performed by: STUDENT IN AN ORGANIZED HEALTH CARE EDUCATION/TRAINING PROGRAM

## 2021-10-25 ENCOUNTER — TELEPHONE (OUTPATIENT)
Dept: OBGYN CLINIC | Facility: MEDICAL CENTER | Age: 21
End: 2021-10-25

## 2021-10-31 ENCOUNTER — HOSPITAL ENCOUNTER (OUTPATIENT)
Dept: MRI IMAGING | Facility: HOSPITAL | Age: 21
Discharge: HOME | End: 2021-10-31
Payer: COMMERCIAL

## 2021-10-31 DIAGNOSIS — M65.832 EXTENSOR TENOSYNOVITIS OF LEFT WRIST: ICD-10-CM

## 2021-10-31 DIAGNOSIS — M25.532 CHRONIC PAIN OF LEFT WRIST: ICD-10-CM

## 2021-10-31 DIAGNOSIS — G89.29 CHRONIC PAIN OF LEFT WRIST: ICD-10-CM

## 2021-10-31 PROCEDURE — 73221 MRI JOINT UPR EXTREM W/O DYE: CPT

## 2021-10-31 PROCEDURE — G1004 CDSM NDSC: HCPCS

## 2021-11-01 ENCOUNTER — OFFICE VISIT (OUTPATIENT)
Dept: OBGYN CLINIC | Facility: MEDICAL CENTER | Age: 21
End: 2021-11-01
Payer: COMMERCIAL

## 2021-11-01 VITALS
SYSTOLIC BLOOD PRESSURE: 120 MMHG | HEIGHT: 63 IN | BODY MASS INDEX: 28.88 KG/M2 | DIASTOLIC BLOOD PRESSURE: 68 MMHG | WEIGHT: 163 LBS

## 2021-11-01 DIAGNOSIS — D27.0 DERMOID CYST OF OVARY, RIGHT: Primary | ICD-10-CM

## 2021-11-01 PROCEDURE — 99213 OFFICE O/P EST LOW 20 MIN: CPT | Performed by: OBSTETRICS & GYNECOLOGY

## 2021-11-10 ENCOUNTER — OFFICE VISIT (OUTPATIENT)
Dept: OBGYN CLINIC | Facility: MEDICAL CENTER | Age: 21
End: 2021-11-10
Payer: COMMERCIAL

## 2021-11-10 VITALS
DIASTOLIC BLOOD PRESSURE: 81 MMHG | BODY MASS INDEX: 28.84 KG/M2 | HEIGHT: 63 IN | HEART RATE: 89 BPM | WEIGHT: 162.8 LBS | SYSTOLIC BLOOD PRESSURE: 127 MMHG

## 2021-11-10 DIAGNOSIS — G89.29 CHRONIC PAIN OF LEFT WRIST: ICD-10-CM

## 2021-11-10 DIAGNOSIS — M65.832 EXTENSOR TENOSYNOVITIS OF LEFT WRIST: Primary | ICD-10-CM

## 2021-11-10 DIAGNOSIS — M25.532 CHRONIC PAIN OF LEFT WRIST: ICD-10-CM

## 2021-11-10 PROCEDURE — 99213 OFFICE O/P EST LOW 20 MIN: CPT | Performed by: STUDENT IN AN ORGANIZED HEALTH CARE EDUCATION/TRAINING PROGRAM

## 2021-11-10 PROCEDURE — 3008F BODY MASS INDEX DOCD: CPT | Performed by: STUDENT IN AN ORGANIZED HEALTH CARE EDUCATION/TRAINING PROGRAM

## 2021-11-10 PROCEDURE — 1036F TOBACCO NON-USER: CPT | Performed by: STUDENT IN AN ORGANIZED HEALTH CARE EDUCATION/TRAINING PROGRAM

## 2021-12-02 ENCOUNTER — OFFICE VISIT (OUTPATIENT)
Dept: URGENT CARE | Facility: MEDICAL CENTER | Age: 21
End: 2021-12-02
Payer: COMMERCIAL

## 2021-12-02 VITALS
RESPIRATION RATE: 16 BRPM | BODY MASS INDEX: 27.46 KG/M2 | DIASTOLIC BLOOD PRESSURE: 74 MMHG | TEMPERATURE: 97.2 F | WEIGHT: 155 LBS | HEIGHT: 63 IN | SYSTOLIC BLOOD PRESSURE: 132 MMHG | HEART RATE: 90 BPM | OXYGEN SATURATION: 98 %

## 2021-12-02 DIAGNOSIS — N39.0 URINARY TRACT INFECTION WITHOUT HEMATURIA, SITE UNSPECIFIED: Primary | ICD-10-CM

## 2021-12-02 LAB
SL AMB  POCT GLUCOSE, UA: ABNORMAL
SL AMB LEUKOCYTE ESTERASE,UA: ABNORMAL
SL AMB POCT BILIRUBIN,UA: ABNORMAL
SL AMB POCT BLOOD,UA: ABNORMAL
SL AMB POCT CLARITY,UA: ABNORMAL
SL AMB POCT COLOR,UA: ABNORMAL
SL AMB POCT KETONES,UA: ABNORMAL
SL AMB POCT NITRITE,UA: ABNORMAL
SL AMB POCT PH,UA: 6
SL AMB POCT SPECIFIC GRAVITY,UA: 1.02
SL AMB POCT URINE PROTEIN: ABNORMAL
SL AMB POCT UROBILINOGEN: 0.2

## 2021-12-02 PROCEDURE — 81002 URINALYSIS NONAUTO W/O SCOPE: CPT | Performed by: PHYSICIAN ASSISTANT

## 2021-12-02 PROCEDURE — G0382 LEV 3 HOSP TYPE B ED VISIT: HCPCS | Performed by: PHYSICIAN ASSISTANT

## 2021-12-02 PROCEDURE — 87086 URINE CULTURE/COLONY COUNT: CPT | Performed by: PHYSICIAN ASSISTANT

## 2021-12-02 RX ORDER — CEPHALEXIN 500 MG/1
500 CAPSULE ORAL EVERY 8 HOURS SCHEDULED
Qty: 30 CAPSULE | Refills: 0 | Status: SHIPPED | OUTPATIENT
Start: 2021-12-02 | End: 2021-12-12

## 2021-12-04 LAB — BACTERIA UR CULT: NORMAL

## 2022-01-10 ENCOUNTER — OFFICE VISIT (OUTPATIENT)
Dept: OBGYN CLINIC | Facility: HOSPITAL | Age: 22
End: 2022-01-10
Payer: COMMERCIAL

## 2022-01-10 VITALS
HEART RATE: 71 BPM | RESPIRATION RATE: 17 BRPM | DIASTOLIC BLOOD PRESSURE: 80 MMHG | WEIGHT: 162 LBS | SYSTOLIC BLOOD PRESSURE: 114 MMHG | BODY MASS INDEX: 28.7 KG/M2 | HEIGHT: 63 IN

## 2022-01-10 DIAGNOSIS — M77.8 LEFT WRIST TENDINITIS: Primary | ICD-10-CM

## 2022-01-10 DIAGNOSIS — M25.532 CHRONIC PAIN OF LEFT WRIST: ICD-10-CM

## 2022-01-10 DIAGNOSIS — G89.29 CHRONIC PAIN OF LEFT WRIST: ICD-10-CM

## 2022-01-10 DIAGNOSIS — M65.832 EXTENSOR TENOSYNOVITIS OF LEFT WRIST: ICD-10-CM

## 2022-01-10 PROCEDURE — 1036F TOBACCO NON-USER: CPT | Performed by: SURGERY

## 2022-01-10 PROCEDURE — 20550 NJX 1 TENDON SHEATH/LIGAMENT: CPT | Performed by: SURGERY

## 2022-01-10 PROCEDURE — 3008F BODY MASS INDEX DOCD: CPT | Performed by: SURGERY

## 2022-01-10 PROCEDURE — 99244 OFF/OP CNSLTJ NEW/EST MOD 40: CPT | Performed by: SURGERY

## 2022-01-10 RX ORDER — DEXAMETHASONE SODIUM PHOSPHATE 100 MG/10ML
40 INJECTION INTRAMUSCULAR; INTRAVENOUS
Status: COMPLETED | OUTPATIENT
Start: 2022-01-10 | End: 2022-01-10

## 2022-01-10 RX ORDER — MELOXICAM 15 MG/1
15 TABLET ORAL DAILY
Qty: 30 TABLET | Refills: 2 | Status: SHIPPED | OUTPATIENT
Start: 2022-01-10

## 2022-01-10 RX ORDER — LIDOCAINE HYDROCHLORIDE 10 MG/ML
1 INJECTION, SOLUTION INFILTRATION; PERINEURAL
Status: COMPLETED | OUTPATIENT
Start: 2022-01-10 | End: 2022-01-10

## 2022-01-10 RX ADMIN — LIDOCAINE HYDROCHLORIDE 1 ML: 10 INJECTION, SOLUTION INFILTRATION; PERINEURAL at 10:51

## 2022-01-10 RX ADMIN — DEXAMETHASONE SODIUM PHOSPHATE 40 MG: 100 INJECTION INTRAMUSCULAR; INTRAVENOUS at 10:51

## 2022-01-10 NOTE — PROGRESS NOTES
Danna CLEVELAND  Attending, Orthopaedic Surgery  Hand, Wrist, and Elbow Surgery  [facility]      ORTHOPAEDIC HAND, WRIST, AND ELBOW OFFICE  VISIT       ASSESSMENT/PLAN:      24 y o  female with left wrist ECU tendinitis with mild tenosynovitis  X-ray and MRI results were reviewed in the office today  The etiology of ECU tendinitis was discussed along with treatment options  It was discussed with Frank Hiltonpepitojaylene that aprox  70 percent of people do get improvement with formal therapy and a ECU CSI  Briefly discussed surgical intervention  There is a 60 percent change she would get some improvement with surgery, but 40 percent change surgery will not be beneficial for her as she does not have a tear  Surgery would require immobilization for 4 weeks, then rehab afterwards  Frank Fink elected to proceed with a left wrist ECU CSI and formal therapy  The CSI was performed in the office without complication  Post injection protocol/expectations were reviewed  OT order was provided, advised to attend therapy 2x a week as well as perform a HEP  Advised to continue the use of the wrist brace at night  If symptoms fail to improve in 6 weeks time, surgical intervention may be re-discussed  The patient verbalized understanding of exam findings and treatment plan  We engaged in the shared decision-making process and treatment options were discussed at length with the patient  Surgical and conservative management discussed today along with risks and benefits  Diagnoses and all orders for this visit:    Left wrist tendinitis  -     Ambulatory referral to PT/OT hand therapy; Future  -     Hand/upper extremity injection    Extensor tenosynovitis of left wrist  -     Ambulatory referral to Hand Surgery    Chronic pain of left wrist  -     Ambulatory referral to Hand Surgery      Follow Up:  Return in about 6 weeks (around 2/21/2022)      To Do Next Visit:  Re-evaluation of current issue      ____________________________________________________________________________________________________________________________________________      CHIEF COMPLAINT:  Chief Complaint   Patient presents with    Left Wrist - Pain       SUBJECTIVE:  Harman Rodarte is a 24y o  year old RHD female who presents to the office today for left wrist pain  I am seeing Carol in consultation at the request of Dr Nicole Castillo  She notes pain to the ulnar aspect of her left wrist  Pain will worsen with use of her wrist, such as tying up her hair  Pain has been ongoing for aprox  6 months, she denies any injury or trama  She underwent a ultrasound guided ECU tendon sheath CSI on 6/25/21, which was beneficial for aprox  1 month  She is wearing a wrist brace at night  She is not currently taking anything for pain control  She has not undergone formal therapy thus far         Pain/symptom timing:  Worse during the day when active  Pain/symptom context:  Worse with activites and work  Pain/symptom modifying factors:  Rest makes better, activities make worse  Pain/symptom associated signs/symptoms: none    Prior treatment   · NSAIDsNo   · Injections Yes   · Bracing/Orthotics Yes    Physical Therapy No     I have personally reviewed all the relevant PMH, PSH, SH, FH, Medications and allergies      PAST MEDICAL HISTORY:  Past Medical History:   Diagnosis Date    Heart murmur     Right ovarian cyst 6/2/2020       PAST SURGICAL HISTORY:  Past Surgical History:   Procedure Laterality Date    TONSILLECTOMY         FAMILY HISTORY:  Family History   Problem Relation Age of Onset    Diabetes Maternal Grandmother     Coronary artery disease Maternal Grandfather        SOCIAL HISTORY:  Social History     Tobacco Use    Smoking status: Never Smoker    Smokeless tobacco: Never Used   Vaping Use    Vaping Use: Never used   Substance Use Topics    Alcohol use: Not Currently    Drug use: Never       MEDICATIONS:    Current Outpatient Medications:     BIOTIN PO, Take by mouth daily (Patient not taking: Reported on 1/10/2022 ), Disp: , Rfl:     Current Facility-Administered Medications:     levonorgestrel (CORY) 13 5 mg intrauterine device (IUD), 1 Intra Uterine Device, Intrauterine, Once, Navdeep Ko MD    ALLERGIES:  No Known Allergies        REVIEW OF SYSTEMS:  Review of Systems   Constitutional: Negative for chills, fever and unexpected weight change  HENT: Negative for hearing loss, nosebleeds and sore throat  Eyes: Negative for pain, redness and visual disturbance  Respiratory: Negative for cough, shortness of breath and wheezing  Cardiovascular: Negative for chest pain, palpitations and leg swelling  Gastrointestinal: Negative for abdominal pain, nausea and vomiting  Endocrine: Negative for polydipsia and polyuria  Genitourinary: Negative for difficulty urinating and hematuria  Musculoskeletal: Negative for arthralgias, joint swelling and myalgias  Skin: Negative for rash and wound  Neurological: Negative for dizziness, numbness and headaches  Psychiatric/Behavioral: Negative for decreased concentration, dysphoric mood and suicidal ideas  The patient is not nervous/anxious          VITALS:  Vitals:    01/10/22 1007   BP: 114/80   Pulse: 71   Resp: 17       LABS:  HgA1c: No results found for: HGBA1C  BMP:   Lab Results   Component Value Date    CALCIUM 9 6 01/15/2021    K 4 2 01/15/2021    CO2 29 01/15/2021     01/15/2021    BUN 15 01/15/2021    CREATININE 0 61 01/15/2021       _____________________________________________________  PHYSICAL EXAMINATION:  General: well developed and well nourished, alert, oriented times 3 and appears comfortable  Psychiatric: Normal  HEENT: Normocephalic, Atraumatic Trachea Midline, No torticollis  Pulmonary: No audible wheezing or respiratory distress   Abdomen/GI: Non tender, non distended   Cardiovascular: No pitting edema, 2+ radial pulse   Skin: No masses, erythema, lacerations, fluctation, ulcerations  Neurovascular: Sensation Intact to the Median, Ulnar, Radial Nerve, Motor Intact to the Median, Ulnar, Radial Nerve and Pulses Intact  Musculoskeletal: Normal, except as noted in detailed exam and in HPI  MUSCULOSKELETAL EXAMINATION:    Left wrist:     No erythema, ecchymosis or edema   ECU tender to palpation   Pain with ECU synergy test   Wrist ROM is full   Full composite fist   2+ radial pulse     ___________________________________________________  STUDIES REVIEWED:  I have personality reviewed MRI axial, sagittal and coronal series  ECU tendinitis, no martha tear           PROCEDURES PERFORMED:  Hand/upper extremity injection  Universal Protocol:  Consent: Verbal consent obtained  Written consent not obtained    Risks and benefits: risks, benefits and alternatives were discussed  Consent given by: patient  Site marked: the operative site was marked  Patient identity confirmed: verbally with patient    Supporting Documentation  Indications: pain   Procedure Details  Condition:tendonitis Condition comment: left wrist ECU tendinitis   Preparation: Patient was prepped and draped in the usual sterile fashion  Needle size: 25 G  Ultrasound guidance: no  Medications administered: 1 mL lidocaine 1 %; 40 mg dexamethasone 100 mg/10 mL    Patient tolerance: patient tolerated the procedure well with no immediate complications  Dressing:  Sterile dressing applied            _____________________________________________________      Scribe Attestation    I,:  Angie Brewster am acting as a scribe while in the presence of the attending physician :       I,:  Anju Horn MD personally performed the services described in this documentation    as scribed in my presence :

## 2022-01-21 ENCOUNTER — EVALUATION (OUTPATIENT)
Dept: PHYSICAL THERAPY | Facility: MEDICAL CENTER | Age: 22
End: 2022-01-21
Payer: COMMERCIAL

## 2022-01-21 DIAGNOSIS — M77.8 LEFT WRIST TENDINITIS: ICD-10-CM

## 2022-01-21 DIAGNOSIS — M25.532 LEFT WRIST PAIN: Primary | ICD-10-CM

## 2022-01-21 PROCEDURE — 97110 THERAPEUTIC EXERCISES: CPT | Performed by: PHYSICAL THERAPIST

## 2022-01-21 PROCEDURE — 97161 PT EVAL LOW COMPLEX 20 MIN: CPT | Performed by: PHYSICAL THERAPIST

## 2022-01-21 NOTE — PROGRESS NOTES
PT Evaluation     Today's date: 2022  Patient name: Riley Galvan  : 2000  MRN: [id number]  Referring provider: Karri Berkowitz MD  Dx:   Encounter Diagnosis     ICD-10-CM    1  Left wrist pain  M25 532    2  Left wrist tendinitis  M77 8 Ambulatory referral to PT/OT hand therapy                  Assessment  Assessment details: Ms Mode Polanco is a [address] y o  RHD female who presents today for physical therapy evaluation for chronic left wrist pain  No further referral appears necessary at this time based upon examination findings  Patient presents with increased wrist ROM but with hypomobiltiy with ulnar head AP/PA glide  She has pain at end range extension and with resisted extension and UD  These impairments are consistent with referring diagnosis of ECU tendonitis and contributed to by repetitive work related tasks  Patient would benefit from outpatient physical therapy services to address the observed impairments to reduce pain and improve tolerance to activity  Patient was extensively educated on use of brace for resting tendons to reduce inflammation  She was educated in ergonomic set up with use of split and tented keyboard or a standing desk  She was also educated in activity modifications for painful activities  Please contact me any questions  Thank you for the referral    Impairments: activity intolerance, impaired physical strength, lacks appropriate home exercise program, pain with function and poor body mechanics  Barriers to therapy: Co-pay, work/school schedule  Understanding of Dx/Px/POC: good   Prognosis: good    Goals  1  Patient will be independent individualized HEP  2  Patient will implement activity modifications as discussed  3  Patient will be complaint with bracing  4  Patient will report at most 2-3/10 pain with activity  5  Patient will achieve pain free wrist ROM in all planes  6  Patient will achieve 5/5 pain free strength    7  Patient will be able to perform all work related tasks without limitation due to pain  Plan  Patient would benefit from: skilled physical therapy  Planned modality interventions: thermotherapy: hydrocollator packs  Planned therapy interventions: manual therapy, joint mobilization, patient education, neuromuscular re-education, activity modification, functional ROM exercises, home exercise program, therapeutic activities, therapeutic exercise and strengthening  Frequency: 1x week  Duration in weeks: 8  Plan of Care beginning date: 2022  Plan of Care expiration date: 3/25/2022  Treatment plan discussed with: patient        Subjective Evaluation    History of Present Illness  Mechanism of injury: Ms Cam Sanabria is a 24 y o  female who presents today with reports of left ulnar sided wrist pain  Patient reports onset over 6 months when she woke up sleeping on her wrist  She denies any previous injuries or trauma  She had an Injection in July with one month of relief but symptoms returned  She had a second injection which gave her relief for one week  She was also prescribed meloxicam with no noted relief  She does have a OTC wrist brace but it restricts her from typing at work causing her more discomfort  She does wear the brace at night  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  Patient does not associate symptoms with numbness/tingling  Patient reports concerns for her symptoms improving as she does not know if even surgery would help  Her goals for physical therapy are to improve her ability to use her left hand to assist with ADLs/IADLs  Pain  Current pain ratin  At best pain ratin  At worst pain ratin  Quality: sharp and throbbing  Alleviating factors: rest   Exacerbated by: cold, typing, resting on the hand, carrying, twisting/opening motions      Social Support    Employment status: working (office work)  Hand dominance: right      Diagnostic Tests  Abnormal MRI: Moderate ECU tendinosis with associated mild tenosynovitis  Treatments  Previous treatment: injection treatment  Current treatment comments: brace  Patient Goals  Patient goals for therapy: decreased pain and independence with ADLs/IADLs          Objective     Palpation   Left   Tenderness of the extensor carpi ulnaris  Additional Palpation Details  Palpable clicking at ECU tendon with wrist flexion/extension    Tenderness     Additional Tenderness Details  (+) left ulnar head    Active Range of Motion     Additional Active Range of Motion Details  Hypermobility in all planes     Passive Range of Motion     Additional Passive Range of Motion Details  pain at end range wrist extension and flexion  Hypomobility and pain with ulnar AP/PA glide    Strength/Myotome Testing     Left Wrist/Hand   Wrist extension: 4+ (pain)  Wrist flexion: 5  Radial deviation: 5  Ulnar deviation: 4+ (pain)    Additional Strength Details  Strong and painful resisted ECU    Tests     Left Wrist/Hand   Negative lunotriquetral shear, TFCC load and Castañeda's       Swelling     Left Wrist/Hand   Circumference wrist: 16 4 cm    Right Wrist/Hand   Circumference wrist: 16 cm             Precautions: n/a    HEP: wrist extension isometrics, wrist extensor stretch, activity modification, brace  Manuals 1/21            McConnel tape volar to dorsal ulnar head glide CK            STM ECU nv            Wrist jose nv            laser nv            Neuro Re-Ed             Wrist rolls rocking nv            frisbee wrist proprioception nv                                                                             Ther Ex                                                                                                                     Ther Activity                                       Gait Training                                       Modalities             MHP L wrist pre tx nv

## 2022-01-28 ENCOUNTER — OFFICE VISIT (OUTPATIENT)
Dept: PHYSICAL THERAPY | Facility: MEDICAL CENTER | Age: 22
End: 2022-01-28
Payer: COMMERCIAL

## 2022-01-28 DIAGNOSIS — M25.532 LEFT WRIST PAIN: ICD-10-CM

## 2022-01-28 DIAGNOSIS — M77.8 LEFT WRIST TENDINITIS: Primary | ICD-10-CM

## 2022-01-28 PROCEDURE — 97140 MANUAL THERAPY 1/> REGIONS: CPT | Performed by: PHYSICAL THERAPIST

## 2022-01-28 PROCEDURE — 97112 NEUROMUSCULAR REEDUCATION: CPT | Performed by: PHYSICAL THERAPIST

## 2022-01-28 PROCEDURE — 97010 HOT OR COLD PACKS THERAPY: CPT | Performed by: PHYSICAL THERAPIST

## 2022-01-28 NOTE — PROGRESS NOTES
Daily Note     Today's date: 2022  Patient name: Riley Galvan  : 2000  MRN: [id number]  Referring provider: Karri Berkowitz MD  Dx:   Encounter Diagnosis     ICD-10-CM    1  Left wrist tendinitis  M77 8    2  Left wrist pain  M25 532                   Subjective: Patient reports decreased pain with taping  Objective: See treatment diary below      Assessment: HPL performed to reduce inflammation  Patient responded well to manual interventions  Pain free with isometrics  Initiated wrist stabilization  Patient fatigued as expected  No increase pain post treatment session  McConnel tape applied for wrist support  Patient provided with KT to mimic glide, unable to provide McConnel tape  Plan: Continue per plan of care        Precautions: n/a    HEP: wrist extension isometrics, wrist extensor stretch, activity modification, brace  Manuals            McConnel tape volar to dorsal ulnar head glide CK CK           STM ECU nv CK           Wrist jose nv CK           laser nv x4'           Neuro Re-Ed             Wrist rolls rocking nv x3'           frisbee wrist proprioception nv x3'           KB wrist stabilization with walk  x3'                                                               Ther Ex                                                                                                                     Ther Activity                                       Gait Training                                       Modalities             MHP L wrist pre tx nv x10'

## 2022-02-02 ENCOUNTER — OFFICE VISIT (OUTPATIENT)
Dept: FAMILY MEDICINE CLINIC | Facility: CLINIC | Age: 22
End: 2022-02-02

## 2022-02-02 VITALS
TEMPERATURE: 97.1 F | RESPIRATION RATE: 19 BRPM | DIASTOLIC BLOOD PRESSURE: 82 MMHG | OXYGEN SATURATION: 98 % | HEART RATE: 85 BPM | BODY MASS INDEX: 29.58 KG/M2 | SYSTOLIC BLOOD PRESSURE: 118 MMHG | WEIGHT: 167 LBS

## 2022-02-02 DIAGNOSIS — R42 VERTIGO: ICD-10-CM

## 2022-02-02 DIAGNOSIS — E66.3 OVERWEIGHT: ICD-10-CM

## 2022-02-02 DIAGNOSIS — M77.8 LEFT WRIST TENDONITIS: ICD-10-CM

## 2022-02-02 DIAGNOSIS — Z00.00 ANNUAL PHYSICAL EXAM: Primary | ICD-10-CM

## 2022-02-02 DIAGNOSIS — Z13.31 DEPRESSION SCREEN: ICD-10-CM

## 2022-02-02 PROCEDURE — 1036F TOBACCO NON-USER: CPT | Performed by: PHYSICIAN ASSISTANT

## 2022-02-02 PROCEDURE — 99395 PREV VISIT EST AGE 18-39: CPT | Performed by: PHYSICIAN ASSISTANT

## 2022-02-02 RX ORDER — MECLIZINE HCL 12.5 MG/1
12.5 TABLET ORAL 3 TIMES DAILY PRN
Qty: 30 TABLET | Refills: 1 | Status: SHIPPED | OUTPATIENT
Start: 2022-02-02

## 2022-02-02 NOTE — ASSESSMENT & PLAN NOTE
- Continue follow-up with physical therapy and orthopedics as scheduled  - Continue wearing brace daily  - Continue Mobic 15 mg daily as needed

## 2022-02-02 NOTE — PROGRESS NOTES
[facility] [place]    NAME: Albian Estrada  AGE: 24 y o  SEX: female  : 2000     DATE: 2022     Assessment and Plan:     Problem List Items Addressed This Visit        Musculoskeletal and Integument    Left wrist tendonitis     - Continue follow-up with physical therapy and orthopedics as scheduled  - Continue wearing brace daily  - Continue Mobic 15 mg daily as needed  Other    Vertigo     - Patient was previously prescribed meclizine but had never taken it due to possible side effect of drowsiness  - Will refill meclizine 12 5 mg, to be taken 3 times daily as needed for dizziness   - Advised patient to stay well hydrated throughout the day and to eat small meals several times a day  - Advised to contact the office if symptoms persist or worsen  Relevant Medications    meclizine (ANTIVERT) 12 5 MG tablet      Other Visit Diagnoses     Annual physical exam    -  Primary    Depression screen        Overweight            I have recommended that this patient have a flu shot but she declines at this time  I have discussed the risks and benefits of this examination with her  The patient verbalizes understanding  Immunizations and preventive care screenings were discussed with patient today  Appropriate education was printed on patient's after visit summary  Counseling:  Alcohol/drug use: discussed moderation in alcohol intake, the recommendations for healthy alcohol use, and avoidance of illicit drug use  Dental Health: discussed importance of regular tooth brushing, flossing, and dental visits  Injury prevention: discussed safety/seat belts, safety helmets, smoke detectors, carbon dioxide detectors, and smoking near bedding or upholstery    Sexual health: discussed sexually transmitted diseases, partner selection, use of condoms, avoidance of unintended pregnancy, and contraceptive alternatives  · Exercise: the importance of regular exercise/physical activity was discussed  Recommend exercise 3-5 times per week for at least 30 minutes  BMI Counseling: Body mass index is 29 58 kg/m²  The BMI is above normal  Nutrition recommendations include encouraging healthy choices of fruits and vegetables, consuming healthier snacks and limiting drinks that contain sugar  Exercise recommendations include moderate physical activity 150 minutes/week  No pharmacotherapy was ordered  Rationale for BMI follow-up plan is due to patient being overweight or obese  Return in about 1 year (around 2/2/2023) for Annual physical      Chief Complaint:     Chief Complaint   Patient presents with    Physical Exam    Dizziness    Earache     lt side      History of Present Illness:     Adult Annual Physical   Patient here for a comprehensive physical exam    Patient notes a few days ago she experienced an episode of vertigo  Patient notes for a while it had been controlled  Patient notes she was getting up out of her bed in the morning and experience dizziness along with some nausea  Patient denies any vomiting  Patient notes after she ate something, her symptoms did improve  Patient was previously prescribed meclizine, but she notes she never took it because she was afraid of possible side effect of drowsiness  Also, patient notes for the past few days she has been experiencing left ear pain  Diet and Physical Activity  · Diet/Nutrition: well balanced diet  · Exercise: moderate cardiovascular exercise  PHQ-2/9 Depression Screening    Little interest or pleasure in doing things: 0 - not at all  Feeling down, depressed, or hopeless: 0 - not at all  PHQ-2 Score: 0  PHQ-2 Interpretation: Negative depression screen          General Health  · Sleep: sleeps well  · Hearing: normal - bilateral   · Vision: wears glasses with blue light filter when using the computer     · Dental: regular dental visits  /GYN Health  · Last menstrual period: 1/26/2022, menses are generally regular  · Contraceptive method: IUD placement  Review of Systems:     Review of Systems   Constitutional: Negative for chills, fatigue and fever  HENT: Positive for ear pain (left)  Negative for congestion, ear discharge, hearing loss and sore throat  Eyes: Negative for pain and visual disturbance  Respiratory: Negative for cough, chest tightness and shortness of breath  Cardiovascular: Negative for chest pain and palpitations  Gastrointestinal: Negative for abdominal pain, constipation, diarrhea, nausea and vomiting  Genitourinary: Negative for difficulty urinating and dysuria  Musculoskeletal: Positive for arthralgias (left wrist pain)  Skin: Negative for rash  Neurological: Positive for dizziness  Negative for headaches  Psychiatric/Behavioral: The patient is not nervous/anxious         Past Medical History:     Past Medical History:   Diagnosis Date    Heart murmur     Right ovarian cyst 6/2/2020      Past Surgical History:     Past Surgical History:   Procedure Laterality Date    TONSILLECTOMY        Social History:     Social History     Socioeconomic History    Marital status: Single     Spouse name: None    Number of children: None    Years of education: None    Highest education level: None   Occupational History    None   Tobacco Use    Smoking status: Never Smoker    Smokeless tobacco: Never Used   Vaping Use    Vaping Use: Never used   Substance and Sexual Activity    Alcohol use: Not Currently    Drug use: Never    Sexual activity: Yes     Partners: Male     Birth control/protection: Condom Male, Other   Other Topics Concern    None   Social History Narrative    None     Social Determinants of Health     Financial Resource Strain: Low Risk     Difficulty of Paying Living Expenses: Not hard at all   Food Insecurity: No Food Insecurity    Worried About Running Out of Food in the Last Year: Never true   [address] in the Last Year: Never true   Transportation Needs: No Transportation Needs    Lack of Transportation (Medical): No    Lack of Transportation (Non-Medical): No   Physical Activity: Not on file   Stress: Not on file   Social Connections: Not on file   Intimate Partner Violence: Not on file   Housing Stability: Not on file      Family History:     Family History   Problem Relation Age of Onset    Diabetes Maternal Grandmother     Coronary artery disease Maternal Grandfather       Current Medications:     Current Outpatient Medications   Medication Sig Dispense Refill    meloxicam (Mobic) 15 mg tablet Take 1 tablet (15 mg total) by mouth daily 30 tablet 2    meclizine (ANTIVERT) 12 5 MG tablet Take 1 tablet (12 5 mg total) by mouth 3 (three) times a day as needed for dizziness 30 tablet 1     Current Facility-Administered Medications   Medication Dose Route Frequency Provider Last Rate Last Admin    levonorgestrel (CORY) 13 5 mg intrauterine device (IUD)  1 Intra Uterine Device Intrauterine Once Nasrin Perkins MD          Allergies:     No Known Allergies   Physical Exam:     /82 (BP Location: Left arm, Patient Position: Sitting, Cuff Size: Adult)   Pulse 85   Temp (!) 97 1 °F (36 2 °C) (Temporal)   Resp 19   Wt 75 8 kg (167 lb)   LMP 01/26/2022   SpO2 98%   BMI 29 58 kg/m²     Physical Exam  Vitals and nursing note reviewed  Constitutional:       General: She is not in acute distress  Appearance: She is well-developed  HENT:      Head: Normocephalic and atraumatic  Right Ear: Tympanic membrane and external ear normal       Left Ear: Tympanic membrane and external ear normal       Nose: Nose normal    Eyes:      Conjunctiva/sclera: Conjunctivae normal    Cardiovascular:      Rate and Rhythm: Normal rate and regular rhythm  Pulses: Normal pulses  Heart sounds: Normal heart sounds     Pulmonary:      Effort: Pulmonary effort is normal  No respiratory distress  Breath sounds: Normal breath sounds  No wheezing  Abdominal:      General: Bowel sounds are normal       Palpations: Abdomen is soft  Tenderness: There is no abdominal tenderness  Musculoskeletal:         General: Normal range of motion  Cervical back: Normal range of motion and neck supple  Skin:     General: Skin is warm and dry  Neurological:      Mental Status: She is alert and oriented to person, place, and time     Psychiatric:         Behavior: Behavior normal          HARISH Jacob

## 2022-02-02 NOTE — ASSESSMENT & PLAN NOTE
- Patient was previously prescribed meclizine but had never taken it due to possible side effect of drowsiness  - Will refill meclizine 12 5 mg, to be taken 3 times daily as needed for dizziness   - Advised patient to stay well hydrated throughout the day and to eat small meals several times a day  - Advised to contact the office if symptoms persist or worsen

## 2022-02-02 NOTE — PATIENT INSTRUCTIONS

## 2022-02-04 ENCOUNTER — OFFICE VISIT (OUTPATIENT)
Dept: PHYSICAL THERAPY | Facility: MEDICAL CENTER | Age: 22
End: 2022-02-04
Payer: COMMERCIAL

## 2022-02-04 DIAGNOSIS — M25.532 LEFT WRIST PAIN: ICD-10-CM

## 2022-02-04 DIAGNOSIS — M77.8 LEFT WRIST TENDINITIS: Primary | ICD-10-CM

## 2022-02-04 PROCEDURE — 97140 MANUAL THERAPY 1/> REGIONS: CPT | Performed by: PHYSICAL THERAPIST

## 2022-02-04 PROCEDURE — 97112 NEUROMUSCULAR REEDUCATION: CPT | Performed by: PHYSICAL THERAPIST

## 2022-02-04 NOTE — PROGRESS NOTES
Daily Note     Today's date: 2022  Patient name: Pritesh Jackson  : 2000  MRN: [id number]  Referring provider: Yari Calhoun MD  Dx:   Encounter Diagnosis     ICD-10-CM    1  Left wrist tendinitis  M77 8    2  Left wrist pain  M25 532                   Subjective: Patient states the taping provides her compression which reduces pain and clicking  Objective: See treatment diary below      Assessment: Patient responded well to treatment session  She has palpable and audible clicking at the ECU with passive wrist flexion which resolves with dorsal glide of ulnar head  She had pain free wrist isometrics  She was challenged with wrist proprioceptive exercises requiring cueing for positioning  McConnel tape applied post treatment session for dorsal glide of ulna  Patient will benefit from continued PT  Plan: Continue per plan of care        Precautions: n/a    HEP: wrist extension isometrics, wrist extensor stretch, activity modification, brace  Manuals           McConnel tape volar to dorsal ulnar head glide CK CK CK          STM ECU nv CK CK          Wrist jose nv CK CK          laser nv x4' x4'          Neuro Re-Ed             Wrist rolls rocking nv x3' x3'          frisbee walk with plate proprioception nv x3' x3' 1#          KB wrist stabilization with walk  x3' x3'                                                              Ther Ex                                                                                                                     Ther Activity                                       Gait Training                                       Modalities             MHP L wrist pre tx nv x10' x10'

## 2022-02-07 ENCOUNTER — OFFICE VISIT (OUTPATIENT)
Dept: PHYSICAL THERAPY | Facility: MEDICAL CENTER | Age: 22
End: 2022-02-07
Payer: COMMERCIAL

## 2022-02-07 DIAGNOSIS — M25.532 LEFT WRIST PAIN: ICD-10-CM

## 2022-02-07 DIAGNOSIS — M77.8 LEFT WRIST TENDINITIS: Primary | ICD-10-CM

## 2022-02-07 PROCEDURE — 97140 MANUAL THERAPY 1/> REGIONS: CPT | Performed by: PHYSICAL THERAPIST

## 2022-02-07 PROCEDURE — 97112 NEUROMUSCULAR REEDUCATION: CPT | Performed by: PHYSICAL THERAPIST

## 2022-02-07 NOTE — PROGRESS NOTES
Daily Note     Today's date: 2022  Patient name: La Nena Feldman  : 2000  MRN: [id number]  Referring provider: Manuela Steiner MD  Dx:   Encounter Diagnosis     ICD-10-CM    1  Left wrist tendinitis  M77 8    2  Left wrist pain  M25 532                   Subjective: Patient states she was sore after previous treatment session but it returned to baseline the following day  Objective: See treatment diary below      Assessment: Patient able to perform isometrics pain free in all panes with increased hold time  She does continue to presents with frequent "clicking" at the ulnar aspect of the left wrist with flexion that resolves with dorsal ulnar head glide  Because of her positive response to taping, I did provide her with a wrist widget to mimic the effect of the tape that would provide her with more frequent support and easy self application  Monitor response nv  Plan: Continue per plan of care        Precautions: n/a    HEP: wrist extension isometrics, wrist extensor stretch, activity modification, brace  Manuals          McConnel tape volar to dorsal ulnar head glide CK CK CK widget          STM ECU nv CK CK CK         Wrist jose nv CK CK CK         laser nv x4' x4' x4'         Neuro Re-Ed             Wrist rolls rocking nv x3' x3' x3'         frisbee  proprioception nv x3' x3' 1# x3'         KB wrist stabilization with walk  x3' x3' x3'                                                             Ther Ex                                                                                                                     Ther Activity                                       Gait Training                                       Modalities             MHP L wrist pre tx nv x10' x10' x10'

## 2022-02-11 ENCOUNTER — APPOINTMENT (OUTPATIENT)
Dept: PHYSICAL THERAPY | Facility: MEDICAL CENTER | Age: 22
End: 2022-02-11
Payer: COMMERCIAL

## 2022-02-16 NOTE — ASSESSMENT & PLAN NOTE
1. Start oxcarbazepine 150mg twice a day   2. 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 Follow up 3 months   4. Log episodes          Repeat Sonogram yearly   No pain  Only indication for the surgery is  Size is large  Pain   Torsion     We went over the plan and the options and she is in agreement she is not interested in surgery as of now

## 2022-02-18 ENCOUNTER — APPOINTMENT (OUTPATIENT)
Dept: PHYSICAL THERAPY | Facility: MEDICAL CENTER | Age: 22
End: 2022-02-18
Payer: COMMERCIAL

## 2022-02-22 ENCOUNTER — APPOINTMENT (OUTPATIENT)
Dept: PHYSICAL THERAPY | Facility: MEDICAL CENTER | Age: 22
End: 2022-02-22
Payer: COMMERCIAL

## 2022-02-23 ENCOUNTER — APPOINTMENT (OUTPATIENT)
Dept: PHYSICAL THERAPY | Facility: MEDICAL CENTER | Age: 22
End: 2022-02-23
Payer: COMMERCIAL

## 2022-02-25 ENCOUNTER — OFFICE VISIT (OUTPATIENT)
Dept: PHYSICAL THERAPY | Facility: MEDICAL CENTER | Age: 22
End: 2022-02-25
Payer: COMMERCIAL

## 2022-02-25 DIAGNOSIS — M25.532 LEFT WRIST PAIN: ICD-10-CM

## 2022-02-25 DIAGNOSIS — M77.8 LEFT WRIST TENDINITIS: Primary | ICD-10-CM

## 2022-02-25 PROCEDURE — 97140 MANUAL THERAPY 1/> REGIONS: CPT | Performed by: PHYSICAL THERAPIST

## 2022-02-25 PROCEDURE — 97112 NEUROMUSCULAR REEDUCATION: CPT | Performed by: PHYSICAL THERAPIST

## 2022-02-25 NOTE — PROGRESS NOTES
Daily Note     Today's date: 2022  Patient name: Pritesh Jackson  : 2000  MRN: [id number]  Referring provider: Yari Calhoun MD  Dx:   Encounter Diagnosis     ICD-10-CM    1  Left wrist tendinitis  M77 8    2  Left wrist pain  M25 532                   Subjective: Cuauhtemoc Rodriguez reports increased dorsal lateral wrist pain today  She admits to increased gym activity and decreased brace use  She does however notice benefits and improvements with physical therapy with decreased pain overall  Objective: See treatment diary below      Assessment: Patient maintained pain free isometrics  Progressed to low load eccentrics with no difficulty  She did have discomfort today with kettle bell stability exercise which was modified to 2# DB with improved tolerance  Patient recommended to continue using brace at this time  She will benefit from continued PT to improve left wrist stability and strength to improve tolerance to lifting, carrying, and exercise  Plan: Continue per plan of care        Precautions: n/a    HEP: wrist extension isometrics, wrist extensor stretch, activity modification, brace  Manuals         McConnel tape volar to dorsal ulnar head glide CK CK CK widget          STM ECU nv CK CK CK CK        Wrist jose nv CK CK CK CK        laser nv x4' x4' x4' x4'        Neuro Re-Ed             Wrist rolls rocking nv x3' x3' x3' x3'        frisbee  proprioception nv x3' x3' 1# x3' x3'        KB wrist stabilization with walk  x3' x3' x3' x3' YKB        Wrist ext eccentrics     1# 2x10                                               Ther Ex                                                                                                                     Ther Activity                                       Gait Training                                       Modalities             MHP L wrist pre tx nv x10' x10' x10' x10'

## 2022-03-03 ENCOUNTER — APPOINTMENT (OUTPATIENT)
Dept: PHYSICAL THERAPY | Facility: MEDICAL CENTER | Age: 22
End: 2022-03-03
Payer: COMMERCIAL

## 2022-03-11 ENCOUNTER — OFFICE VISIT (OUTPATIENT)
Dept: PHYSICAL THERAPY | Facility: MEDICAL CENTER | Age: 22
End: 2022-03-11
Payer: COMMERCIAL

## 2022-03-11 DIAGNOSIS — M25.532 LEFT WRIST PAIN: ICD-10-CM

## 2022-03-11 DIAGNOSIS — M77.8 LEFT WRIST TENDINITIS: Primary | ICD-10-CM

## 2022-03-11 PROCEDURE — 97112 NEUROMUSCULAR REEDUCATION: CPT | Performed by: PHYSICAL THERAPIST

## 2022-03-11 PROCEDURE — 97140 MANUAL THERAPY 1/> REGIONS: CPT | Performed by: PHYSICAL THERAPIST

## 2022-03-15 ENCOUNTER — OFFICE VISIT (OUTPATIENT)
Dept: PHYSICAL THERAPY | Facility: MEDICAL CENTER | Age: 22
End: 2022-03-15
Payer: COMMERCIAL

## 2022-03-15 DIAGNOSIS — M77.8 LEFT WRIST TENDINITIS: Primary | ICD-10-CM

## 2022-03-15 DIAGNOSIS — M25.532 LEFT WRIST PAIN: ICD-10-CM

## 2022-03-15 PROCEDURE — 97112 NEUROMUSCULAR REEDUCATION: CPT | Performed by: PHYSICAL THERAPIST

## 2022-03-15 PROCEDURE — 97140 MANUAL THERAPY 1/> REGIONS: CPT | Performed by: PHYSICAL THERAPIST

## 2022-03-15 PROCEDURE — 97110 THERAPEUTIC EXERCISES: CPT | Performed by: PHYSICAL THERAPIST

## 2022-03-15 NOTE — PROGRESS NOTES
Daily Note     Today's date: 3/15/2022  Patient name: Terry Lilly  : 2000  MRN: [id number]  Referring provider: Sisi Carlos MD  Dx:   Encounter Diagnosis     ICD-10-CM    1  Left wrist tendinitis  M77 8    2  Left wrist pain  M25 532                   Subjective: Sp Kang reports she has good days and bad days  She states her wrist does not click as much as it used to  Objective: See treatment diary below      Assessment: Patient improving in wrist motor and was able to progress to banded rotation for stability  She continues to be challenged with KB press and walk but improved with cues for hand placement to reduce strain on ulnar side of wrist  Patient will benefit from continued PT for HPL for pain reduction and for appropriate progression of wrist stabilization and strengthening  Plan: Continue per plan of care        Precautions: n/a    HEP: wrist extension isometrics, wrist extensor stretch, activity modification, brace  Manuals 1/21 1/28 2/4 2/7 2/25 3/11 3/15      McConnel tape volar to dorsal ulnar head glide CK CK CK widget          STM ECU nv CK CK CK CK CK CK      Wrist jose nv CK CK CK CK CK CK      laser nv x4' x4' x4' x4' x4' x4'      Neuro Re-Ed             Wrist rolls rocking nv x3' x3' x3' x3' x3' x3'      frisbee  proprioception nv x3' x3' 1# x3' x3' x3' x3'      KB wrist stabilization with walk  x3' x3' x3' x3' YKB x3' YKB x3' YKB      Wrist ext eccentrics     1# 2x10 1# 3x10 1# 3x10      KB shoulder press      YKB 3x5 YKB 3x5      Wrist rotation stability       YTB 2x15 ea                   Ther Ex                                                                                                                     Ther Activity                                       Gait Training                                       Modalities             MHP L wrist pre tx nv x10' x10' x10' x10' x10' x10'

## 2022-03-18 ENCOUNTER — APPOINTMENT (OUTPATIENT)
Dept: PHYSICAL THERAPY | Facility: MEDICAL CENTER | Age: 22
End: 2022-03-18
Payer: COMMERCIAL

## 2022-03-25 ENCOUNTER — TELEPHONE (OUTPATIENT)
Dept: OBGYN CLINIC | Facility: CLINIC | Age: 22
End: 2022-03-25

## 2022-03-25 ENCOUNTER — OFFICE VISIT (OUTPATIENT)
Dept: PHYSICAL THERAPY | Facility: MEDICAL CENTER | Age: 22
End: 2022-03-25
Payer: COMMERCIAL

## 2022-03-25 DIAGNOSIS — M77.8 LEFT WRIST TENDINITIS: Primary | ICD-10-CM

## 2022-03-25 DIAGNOSIS — M25.532 LEFT WRIST PAIN: ICD-10-CM

## 2022-03-25 PROCEDURE — 97140 MANUAL THERAPY 1/> REGIONS: CPT | Performed by: PHYSICAL THERAPIST

## 2022-03-25 PROCEDURE — 97112 NEUROMUSCULAR REEDUCATION: CPT | Performed by: PHYSICAL THERAPIST

## 2022-03-25 PROCEDURE — 97110 THERAPEUTIC EXERCISES: CPT | Performed by: PHYSICAL THERAPIST

## 2022-03-25 NOTE — TELEPHONE ENCOUNTER
Hello,  Please advise if the following patient can be forced onto the schedule:    Patient: Sandra Parikh    : 2000    WXK:[id number]    Call back #:[phone]    [address] [facility]     Reason for appointment: f/u left wrist     Requested doctor/location: [facility]       Thank you

## 2022-03-25 NOTE — PROGRESS NOTES
Daily Note     Today's date: 3/25/2022  Patient name: Lavonne Fermin  : 2000  MRN: [id number]  Referring provider: Pia Beck MD  Dx:   Encounter Diagnosis     ICD-10-CM    1  Left wrist tendinitis  M77 8    2  Left wrist pain  M25 532                   Subjective: Maureen Kingston states her pain is less but still has pain if she lifts something too heavy such as a two grocery bags, one including a gallon of milk  Objective: See treatment diary below    Outcome measures: FOTO  = 65 (exceeded expected outcome of 60)    Assessment: Patient continues to progress in stability and strength and overall pain reduction  She continues experience pain when lifting in a supinated position with pain isolated to the ECU tendon insertion  She did have discomfort with the frisbee due to supinated position, therefore was told to hold this for now  Fatigued with KB stability hold and press  Worked on wrist and thumb position for maximal stable position to reduce occurrence of pain  Plan: One more visit to monitor symptoms       Precautions: n/a    HEP: wrist extension isometrics, wrist extensor stretch, activity modification, brace  Manuals  2/4 2/7 2/25 3/11 3/15 3/25     McConnel tape volar to dorsal ulnar head glide CK CK CK widget          STM ECU nv CK CK CK CK CK CK CK     Wrist jose nv CK CK CK CK CK CK CK     laser nv x4' x4' x4' x4' x4' x4' x4'     Neuro Re-Ed             Wrist rolls rocking nv x3' x3' x3' x3' x3' x3' x3'     frisbee  proprioception nv x3' x3' 1# x3' x3' x3' x3' x3'     KB wrist stabilization with walk  x3' x3' x3' x3' YKB x3' YKB x3' YKB x3' YKB     Wrist ext eccentrics     1# 2x10 1# 3x10 1# 3x10 1# 3x10     KB shoulder press      YKB 3x5 YKB 3x5 YKB 3x5+     Wrist rotation stability       YTB 2x15 ea YTB 2x15 ea                  Ther Ex                                                                                                                     Ther Activity Gait Training                                       Modalities             MHP L wrist pre tx nv x10' x10' x10' x10' x10' x10' x10'

## 2022-04-22 ENCOUNTER — TELEPHONE (OUTPATIENT)
Dept: OBGYN CLINIC | Facility: HOSPITAL | Age: 22
End: 2022-04-22

## 2022-10-10 ENCOUNTER — OFFICE VISIT (OUTPATIENT)
Dept: OBGYN CLINIC | Facility: MEDICAL CENTER | Age: 22
End: 2022-10-10
Payer: COMMERCIAL

## 2022-10-10 VITALS
DIASTOLIC BLOOD PRESSURE: 70 MMHG | HEIGHT: 63 IN | SYSTOLIC BLOOD PRESSURE: 115 MMHG | BODY MASS INDEX: 27.89 KG/M2 | WEIGHT: 157.4 LBS

## 2022-10-10 DIAGNOSIS — T83.31XA IUD PREGNANCY: ICD-10-CM

## 2022-10-10 DIAGNOSIS — Z30.432 ENCOUNTER FOR IUD REMOVAL: ICD-10-CM

## 2022-10-10 DIAGNOSIS — Z32.01 POSITIVE PREGNANCY TEST: Primary | ICD-10-CM

## 2022-10-10 DIAGNOSIS — Z33.1 IUD PREGNANCY: ICD-10-CM

## 2022-10-10 LAB — SL AMB POCT URINE HCG: ABNORMAL

## 2022-10-10 PROCEDURE — 99214 OFFICE O/P EST MOD 30 MIN: CPT | Performed by: STUDENT IN AN ORGANIZED HEALTH CARE EDUCATION/TRAINING PROGRAM

## 2022-10-10 PROCEDURE — 81025 URINE PREGNANCY TEST: CPT | Performed by: STUDENT IN AN ORGANIZED HEALTH CARE EDUCATION/TRAINING PROGRAM

## 2022-10-10 NOTE — PROGRESS NOTES
Pregnancy Confirmation Visit  [facility], [address] [place], [place]    Assessment/Plan:  24 y o  Wero Holloway presenting with missed menses and IUD in place  Viable pregnancy 7w1d by LMP consistent with ultrasound today (TED 05/28/2023)  - Options counseling discussed in detail today  We discussed the option to terminate the pregnancy, consider adoption, or to continue the pregnancy  She expressed certainty about wanting to continue the pregnancy  - We discussed the role of IUD removal   Latoya IUD visualized on ultrasound below the pregnancy implantation  She gave written informed consent to IUD removal   She is aware that IUD pregnancies do have a slightly higher miscarriage rate than the background rate  - Continue/start prenatal vitamin  - Return in 7-14 days for follow up ultrasound given IUD pregnancy and measuring [id number]  Subjective:    CC: Missed period    Denece Cooks is a 24 y o  Wero Holloway who presents with missed menses  Patient's last menstrual period was 08/21/2022  Patient notes that this pregnancy was unplanned as she was contracepting with latoya IUD  She had regular 28 day cycles on Latoya  Her LMP is certain 3/61/7178       Objective:  /70   Ht 5' 3" (1 6 m)   Wt 71 4 kg (157 lb 6 4 oz)   LMP 08/21/2022   BMI 27 88 kg/m²     Physical Exam:  General: Well appearing, no distress  CV: Regular rate  Respiratory: Unlabored breathing  Abdomen: Soft, nontender  Extremities: Without edema  Mood and Affect: Appropriate    Transvaginal Pelvic Ultrasound  Shafer IUP  Yolk sac: Present  Fetal Pole: Present  CRL consistent with EGA [id number] (c/w 7w1d by LMP of 8/21/22)  Cardiac activity: Present   bpm  No adnexal masses appreciated  IUD visualized in the cervical canal                          Kathy Bynum MD  [address] [place] [address]  10/11/2022 8:29 AM

## 2022-10-11 PROCEDURE — 58301 REMOVE INTRAUTERINE DEVICE: CPT | Performed by: STUDENT IN AN ORGANIZED HEALTH CARE EDUCATION/TRAINING PROGRAM

## 2022-10-11 NOTE — PROGRESS NOTES
[facility] [address] [place] [address]    Iud removal    Date/Time: 10/11/2022 8:31 AM  Performed by: Saskia Barker MD  Authorized by: Saskia Barker MD   Universal Protocol:  Consent: Written consent obtained  Risks and benefits: risks, benefits and alternatives were discussed  Consent given by: patient  Time out: Immediately prior to procedure a "time out" was called to verify the correct patient, procedure, equipment, support staff and site/side marked as required    Timeout called at: 10/10/2022 2:00 PM   Patient understanding: patient states understanding of the procedure being performed  Patient consent: the patient's understanding of the procedure matches consent given  Procedure consent: procedure consent matches procedure scheduled  Relevant documents: relevant documents present and verified  Test results: test results available and properly labeled  Required items: required blood products, implants, devices, and special equipment available  Patient identity confirmed: verbally with patient      Procedure:     Removed with no complications: yes      Other reason for removal:  Pregnancy      Saskia Feldman MD  [address] [place] [address]  10/11/2022 8:32 AM

## 2022-10-12 ENCOUNTER — LAB (OUTPATIENT)
Dept: LAB | Facility: MEDICAL CENTER | Age: 22
End: 2022-10-12
Payer: COMMERCIAL

## 2022-10-12 ENCOUNTER — OFFICE VISIT (OUTPATIENT)
Dept: OBGYN CLINIC | Facility: MEDICAL CENTER | Age: 22
End: 2022-10-12
Payer: COMMERCIAL

## 2022-10-12 VITALS
DIASTOLIC BLOOD PRESSURE: 74 MMHG | WEIGHT: 155.6 LBS | BODY MASS INDEX: 27.57 KG/M2 | HEIGHT: 63 IN | SYSTOLIC BLOOD PRESSURE: 110 MMHG

## 2022-10-12 DIAGNOSIS — R19.7 DIARRHEA OF PRESUMED INFECTIOUS ORIGIN: ICD-10-CM

## 2022-10-12 DIAGNOSIS — R19.7 DIARRHEA OF PRESUMED INFECTIOUS ORIGIN: Primary | ICD-10-CM

## 2022-10-12 PROBLEM — Z30.431 IUD CHECK UP: Status: RESOLVED | Noted: 2021-05-03 | Resolved: 2022-10-12

## 2022-10-12 LAB
ALBUMIN SERPL BCP-MCNC: 3.5 G/DL (ref 3.5–5)
ALP SERPL-CCNC: 58 U/L (ref 46–116)
ALT SERPL W P-5'-P-CCNC: 41 U/L (ref 12–78)
ANION GAP SERPL CALCULATED.3IONS-SCNC: 6 MMOL/L (ref 4–13)
AST SERPL W P-5'-P-CCNC: 12 U/L (ref 5–45)
BACTERIA UR QL AUTO: ABNORMAL /HPF
BASOPHILS # BLD AUTO: 0.03 THOUSANDS/ΜL (ref 0–0.1)
BASOPHILS NFR BLD AUTO: 0 % (ref 0–1)
BILIRUB SERPL-MCNC: 0.21 MG/DL (ref 0.2–1)
BILIRUB UR QL STRIP: NEGATIVE
BUN SERPL-MCNC: 10 MG/DL (ref 5–25)
CALCIUM SERPL-MCNC: 9.5 MG/DL (ref 8.3–10.1)
CHLORIDE SERPL-SCNC: 108 MMOL/L (ref 96–108)
CLARITY UR: CLEAR
CO2 SERPL-SCNC: 24 MMOL/L (ref 21–32)
COLOR UR: ABNORMAL
CREAT SERPL-MCNC: 0.57 MG/DL (ref 0.6–1.3)
EOSINOPHIL # BLD AUTO: 0.12 THOUSAND/ΜL (ref 0–0.61)
EOSINOPHIL NFR BLD AUTO: 1 % (ref 0–6)
ERYTHROCYTE [DISTWIDTH] IN BLOOD BY AUTOMATED COUNT: 12.6 % (ref 11.6–15.1)
GFR SERPL CREATININE-BSD FRML MDRD: 132 ML/MIN/1.73SQ M
GLUCOSE SERPL-MCNC: 92 MG/DL (ref 65–140)
GLUCOSE UR STRIP-MCNC: NEGATIVE MG/DL
HCT VFR BLD AUTO: 35.8 % (ref 34.8–46.1)
HGB BLD-MCNC: 11.9 G/DL (ref 11.5–15.4)
HGB UR QL STRIP.AUTO: NEGATIVE
IMM GRANULOCYTES # BLD AUTO: 0.04 THOUSAND/UL (ref 0–0.2)
IMM GRANULOCYTES NFR BLD AUTO: 0 % (ref 0–2)
KETONES UR STRIP-MCNC: NEGATIVE MG/DL
LEUKOCYTE ESTERASE UR QL STRIP: ABNORMAL
LYMPHOCYTES # BLD AUTO: 1.38 THOUSANDS/ΜL (ref 0.6–4.47)
LYMPHOCYTES NFR BLD AUTO: 14 % (ref 14–44)
MCH RBC QN AUTO: 30.8 PG (ref 26.8–34.3)
MCHC RBC AUTO-ENTMCNC: 33.2 G/DL (ref 31.4–37.4)
MCV RBC AUTO: 93 FL (ref 82–98)
MONOCYTES # BLD AUTO: 0.88 THOUSAND/ΜL (ref 0.17–1.22)
MONOCYTES NFR BLD AUTO: 9 % (ref 4–12)
MUCOUS THREADS UR QL AUTO: ABNORMAL
NEUTROPHILS # BLD AUTO: 7.27 THOUSANDS/ΜL (ref 1.85–7.62)
NEUTS SEG NFR BLD AUTO: 76 % (ref 43–75)
NITRITE UR QL STRIP: NEGATIVE
NON-SQ EPI CELLS URNS QL MICRO: ABNORMAL /HPF
NRBC BLD AUTO-RTO: 0 /100 WBCS
PH UR STRIP.AUTO: 6 [PH]
PLATELET # BLD AUTO: 225 THOUSANDS/UL (ref 149–390)
PMV BLD AUTO: 11.2 FL (ref 8.9–12.7)
POTASSIUM SERPL-SCNC: 3.4 MMOL/L (ref 3.5–5.3)
PROT SERPL-MCNC: 7.2 G/DL (ref 6.4–8.4)
PROT UR STRIP-MCNC: NEGATIVE MG/DL
RBC # BLD AUTO: 3.86 MILLION/UL (ref 3.81–5.12)
RBC #/AREA URNS AUTO: ABNORMAL /HPF
SODIUM SERPL-SCNC: 138 MMOL/L (ref 135–147)
SP GR UR STRIP.AUTO: 1.02 (ref 1–1.03)
UROBILINOGEN UR STRIP-ACNC: <2 MG/DL
WBC # BLD AUTO: 9.72 THOUSAND/UL (ref 4.31–10.16)
WBC #/AREA URNS AUTO: ABNORMAL /HPF

## 2022-10-12 PROCEDURE — 99213 OFFICE O/P EST LOW 20 MIN: CPT | Performed by: OBSTETRICS & GYNECOLOGY

## 2022-10-12 PROCEDURE — 36415 COLL VENOUS BLD VENIPUNCTURE: CPT

## 2022-10-12 PROCEDURE — 80053 COMPREHEN METABOLIC PANEL: CPT

## 2022-10-12 PROCEDURE — 85025 COMPLETE CBC W/AUTO DIFF WBC: CPT

## 2022-10-12 PROCEDURE — 81001 URINALYSIS AUTO W/SCOPE: CPT | Performed by: OBSTETRICS & GYNECOLOGY

## 2022-10-12 PROCEDURE — 87086 URINE CULTURE/COLONY COUNT: CPT

## 2022-10-12 NOTE — PROGRESS NOTES
[facility] [address] [place] [address]    Assessment/Plan:  No problem-specific Assessment & Plan notes found for this encounter  Diagnoses and all orders for this visit:    Diarrhea of presumed infectious origin  -     CBC and differential; Future  -     Comprehensive metabolic panel; Future  -     Urinalysis with microscopic  -     Urine culture; Future  -     Stool Enteric Bacterial Panel by PCR; Future        Subjective:   Tito Stanley is a 24 y o   female  CC: diarrhea, pregnant    HPI: HPI  Patient presents with a 3-4 day history of diarrhea  She states she will have 79 bowel movements today  She also describes as watery but did have an episode of bloody diarrhea  Denies any sick contacts or recent travel  Patient denies fever, chills, rigors  States she did not eat anything that could have caused her symptoms  She states she has been able to tolerate p o  and eating bland diet  She is currently approximately 7 weeks pregnant  ROS: Review of Systems   Constitutional: Negative  HENT: Negative  Eyes: Negative  Respiratory: Negative  Cardiovascular: Negative  Gastrointestinal: Positive for blood in stool and diarrhea  Genitourinary: Negative  Musculoskeletal: Negative  All other systems reviewed and are negative  PFSH: The following portions of the patient's history were reviewed and updated as appropriate: allergies, current medications, past family history, past medical history, obstetric history, gynecologic history, past social history, past surgical history and problem list        Objective:  /74   Ht 5' 3" (1 6 m)   Wt 70 6 kg (155 lb 9 6 oz)   LMP 2022   BMI 27 56 kg/m²    Physical Exam  Vitals reviewed  Constitutional:       Appearance: Normal appearance  Cardiovascular:      Rate and Rhythm: Normal rate  Pulmonary:      Effort: Pulmonary effort is normal  No respiratory distress  Neurological:      Mental Status: She is alert     Psychiatric:         Mood and Affect: Mood normal          Behavior: Behavior normal

## 2022-10-13 ENCOUNTER — APPOINTMENT (OUTPATIENT)
Dept: LAB | Facility: MEDICAL CENTER | Age: 22
End: 2022-10-13
Payer: COMMERCIAL

## 2022-10-13 PROCEDURE — 87505 NFCT AGENT DETECTION GI: CPT

## 2022-10-14 LAB
BACTERIA UR CULT: ABNORMAL
BACTERIA UR CULT: ABNORMAL
CAMPYLOBACTER DNA SPEC NAA+PROBE: NORMAL
SALMONELLA DNA SPEC QL NAA+PROBE: NORMAL
SHIGA TOXIN STX GENE SPEC NAA+PROBE: NORMAL
SHIGELLA DNA SPEC QL NAA+PROBE: NORMAL

## 2022-10-18 ENCOUNTER — OFFICE VISIT (OUTPATIENT)
Dept: OBGYN CLINIC | Facility: MEDICAL CENTER | Age: 22
End: 2022-10-18

## 2022-10-18 ENCOUNTER — TELEPHONE (OUTPATIENT)
Dept: OBGYN CLINIC | Facility: MEDICAL CENTER | Age: 22
End: 2022-10-18

## 2022-10-18 VITALS
HEIGHT: 63 IN | SYSTOLIC BLOOD PRESSURE: 118 MMHG | DIASTOLIC BLOOD PRESSURE: 76 MMHG | BODY MASS INDEX: 28.1 KG/M2 | WEIGHT: 158.6 LBS

## 2022-10-18 DIAGNOSIS — O09.891 PREGNANCY OCCURRING WHILE USING INTRAUTERINE CONTRACEPTIVE DEVICE (IUD) IN FIRST TRIMESTER: Primary | ICD-10-CM

## 2022-10-18 DIAGNOSIS — R10.2 PELVIC PAIN: ICD-10-CM

## 2022-10-18 DIAGNOSIS — Z3A.08 8 WEEKS GESTATION OF PREGNANCY: Primary | ICD-10-CM

## 2022-10-18 PROCEDURE — 87086 URINE CULTURE/COLONY COUNT: CPT | Performed by: OBSTETRICS & GYNECOLOGY

## 2022-10-18 PROCEDURE — 87077 CULTURE AEROBIC IDENTIFY: CPT | Performed by: OBSTETRICS & GYNECOLOGY

## 2022-10-18 NOTE — PROGRESS NOTES
Assessment  24 y o  [facility] with pregnancy in setting of IUD  IUD removed at last visit and good interval growth since  Plan  Diagnoses and all orders for this visit:    Pregnancy occurring while using intrauterine contraceptive device (IUD) in first trimester  -     AMB US OB < 14 weeks single or first gestation level 1  - Return for OB intake and H&P    Pelvic pain  -     Urine culture      ____________________________________________________________      Subjective   Gustavo Michaud is a 24 y o  [facility] with pregnancy in setting of IUD  TED 05/28/2023 by LMP, confirmed with US 1wk prior  LMP 8/21/22  Initially reported last day of last menstrual  By LMP, 8w 2d  Patient notes that this pregnancy was unplanned and her [place] IUD was removed at last visit  Since that time she has had no acute concerns  Early pregnancy symptoms persist, well managed and unchanged  She does have some lower pelvic cramping and discomfort with void  No burning, but going often and feels achy when does  The following portions of the patient's history were reviewed and updated as appropriate: allergies, current medications, past medical history, past social history and problem list     Review of Systems  Review of Systems   Constitutional: Positive for fatigue  Negative for chills and fever  Respiratory: Negative for cough and shortness of breath  Genitourinary: Positive for frequency, menstrual problem (amenorrhea) and pelvic pain  Negative for dysuria, genital sores, hematuria, vaginal bleeding, vaginal discharge and vaginal pain  Skin: Negative for rash and wound  Neurological: Negative for dizziness and light-headedness  Objective  /76   Ht 5' 3" (1 6 m)   Wt 71 9 kg (158 lb 9 6 oz)   LMP 08/21/2022   BMI 28 09 kg/m²       Physical Exam:  Physical Exam  Exam conducted with a chaperone present  Constitutional:       General: She is not in acute distress  Appearance: Normal appearance   She is not ill-appearing, toxic-appearing or diaphoretic  Eyes:      General: No scleral icterus  Right eye: No discharge  Left eye: No discharge  Conjunctiva/sclera: Conjunctivae normal    Cardiovascular:      Rate and Rhythm: Normal rate  Pulmonary:      Effort: Pulmonary effort is normal  No respiratory distress  Abdominal:      General: There is no distension  Palpations: There is no mass  Tenderness: There is no abdominal tenderness  There is no guarding or rebound  Hernia: No hernia is present  Genitourinary:     General: Normal vulva  Exam position: Lithotomy position  Labia:         Right: No rash, tenderness or lesion  Left: No rash, tenderness or lesion  Vagina: No bleeding  Musculoskeletal:         General: No swelling  Skin:     General: Skin is warm and dry  Coloration: Skin is not jaundiced or pale  Findings: No bruising or erythema  Neurological:      Mental Status: She is alert  Psychiatric:         Mood and Affect: Mood normal          Behavior: Behavior normal          Thought Content:  Thought content normal          Judgment: Judgment normal          Reviewed  US 10/10/22

## 2022-10-20 LAB — BACTERIA UR CULT: ABNORMAL

## 2022-10-28 ENCOUNTER — APPOINTMENT (OUTPATIENT)
Dept: LAB | Facility: MEDICAL CENTER | Age: 22
End: 2022-10-28
Payer: COMMERCIAL

## 2022-10-28 ENCOUNTER — INITIAL PRENATAL (OUTPATIENT)
Dept: OBGYN CLINIC | Facility: MEDICAL CENTER | Age: 22
End: 2022-10-28

## 2022-10-28 VITALS
SYSTOLIC BLOOD PRESSURE: 124 MMHG | BODY MASS INDEX: 27.82 KG/M2 | DIASTOLIC BLOOD PRESSURE: 64 MMHG | WEIGHT: 157 LBS | HEIGHT: 63 IN

## 2022-10-28 DIAGNOSIS — Z34.01 ENCOUNTER FOR SUPERVISION OF NORMAL FIRST PREGNANCY IN FIRST TRIMESTER: ICD-10-CM

## 2022-10-28 DIAGNOSIS — Z34.01 ENCOUNTER FOR SUPERVISION OF NORMAL FIRST PREGNANCY IN FIRST TRIMESTER: Primary | ICD-10-CM

## 2022-10-28 LAB
ABO GROUP BLD: NORMAL
BACTERIA UR QL AUTO: ABNORMAL /HPF
BASOPHILS # BLD AUTO: 0.02 THOUSANDS/ÂΜL (ref 0–0.1)
BASOPHILS NFR BLD AUTO: 0 % (ref 0–1)
BILIRUB UR QL STRIP: NEGATIVE
BLD GP AB SCN SERPL QL: NEGATIVE
CLARITY UR: ABNORMAL
COLOR UR: ABNORMAL
EOSINOPHIL # BLD AUTO: 0.06 THOUSAND/ÂΜL (ref 0–0.61)
EOSINOPHIL NFR BLD AUTO: 1 % (ref 0–6)
ERYTHROCYTE [DISTWIDTH] IN BLOOD BY AUTOMATED COUNT: 12.9 % (ref 11.6–15.1)
GLUCOSE UR STRIP-MCNC: NEGATIVE MG/DL
HBV SURFACE AG SER QL: NORMAL
HCT VFR BLD AUTO: 37.9 % (ref 34.8–46.1)
HGB BLD-MCNC: 12.3 G/DL (ref 11.5–15.4)
HGB UR QL STRIP.AUTO: NEGATIVE
IMM GRANULOCYTES # BLD AUTO: 0.03 THOUSAND/UL (ref 0–0.2)
IMM GRANULOCYTES NFR BLD AUTO: 0 % (ref 0–2)
KETONES UR STRIP-MCNC: NEGATIVE MG/DL
LEUKOCYTE ESTERASE UR QL STRIP: NEGATIVE
LYMPHOCYTES # BLD AUTO: 1.3 THOUSANDS/ÂΜL (ref 0.6–4.47)
LYMPHOCYTES NFR BLD AUTO: 13 % (ref 14–44)
MCH RBC QN AUTO: 30.4 PG (ref 26.8–34.3)
MCHC RBC AUTO-ENTMCNC: 32.5 G/DL (ref 31.4–37.4)
MCV RBC AUTO: 94 FL (ref 82–98)
MONOCYTES # BLD AUTO: 0.9 THOUSAND/ÂΜL (ref 0.17–1.22)
MONOCYTES NFR BLD AUTO: 9 % (ref 4–12)
MUCOUS THREADS UR QL AUTO: ABNORMAL
NEUTROPHILS # BLD AUTO: 7.68 THOUSANDS/ÂΜL (ref 1.85–7.62)
NEUTS SEG NFR BLD AUTO: 77 % (ref 43–75)
NITRITE UR QL STRIP: NEGATIVE
NON-SQ EPI CELLS URNS QL MICRO: ABNORMAL /HPF
NRBC BLD AUTO-RTO: 0 /100 WBCS
PH UR STRIP.AUTO: 8 [PH]
PLATELET # BLD AUTO: 230 THOUSANDS/UL (ref 149–390)
PMV BLD AUTO: 11.3 FL (ref 8.9–12.7)
PROT UR STRIP-MCNC: ABNORMAL MG/DL
RBC # BLD AUTO: 4.05 MILLION/UL (ref 3.81–5.12)
RBC #/AREA URNS AUTO: ABNORMAL /HPF
RH BLD: POSITIVE
RUBV IGG SERPL IA-ACNC: 29.8 IU/ML
SP GR UR STRIP.AUTO: 1.02 (ref 1–1.03)
SPECIMEN EXPIRATION DATE: NORMAL
UROBILINOGEN UR STRIP-ACNC: <2 MG/DL
WBC # BLD AUTO: 9.99 THOUSAND/UL (ref 4.31–10.16)
WBC #/AREA URNS AUTO: ABNORMAL /HPF

## 2022-10-28 PROCEDURE — 87086 URINE CULTURE/COLONY COUNT: CPT

## 2022-10-28 PROCEDURE — OBC: Performed by: STUDENT IN AN ORGANIZED HEALTH CARE EDUCATION/TRAINING PROGRAM

## 2022-10-28 PROCEDURE — 36415 COLL VENOUS BLD VENIPUNCTURE: CPT

## 2022-10-28 PROCEDURE — 81001 URINALYSIS AUTO W/SCOPE: CPT

## 2022-10-28 PROCEDURE — 80081 OBSTETRIC PANEL INC HIV TSTG: CPT

## 2022-10-28 NOTE — PROGRESS NOTES
OB History    Para Term  AB Living   1 0 0 0 0 0   SAB IAB Ectopic Multiple Live Births   0 0 0 0 0      # Outcome Date GA Lbr Oz/2nd Weight Sex Delivery Anes PTL Lv   1 Current                  * Pt presents for OB intake  *  *Pt's LMP was Patient's last menstrual period was 2022  *Ultrasound date:10/18/2022   8weeks 2days  *Estimated date of delivery: 2022   * confirmed by lmp    *Signs/Symptoms of Pregnancy   *no Constipation    *no Headaches   *no Cramping  *no Spotting   Diabetes    *no Hx of GDM    *no BMI >35    *no First degree relative with type 2 diabetes   Hypertension-    *no Hx of chronic HTN   *Infection Screening   *no Does the pt have a hx of MRSA? *no History of herpes? *Immunizations:   *yes Discussed influenza vaccine     declined   *yes Discussed TDaP vaccine   *yes [address]  Depression *n   Anxiety*n  Medications*n  *Interview education   *Handouts given:    *Baby and Me support center     *MyChart sign up instructions    *Lab Locations    *[facility]ians List/Choosing Pediatrician Sheet    *[facility] Childbirth and Parenting Classes    *Schedule for Prenatal Visits    *Pregnancy Warning Signs Reviewed    *Safe Medications During Pregnancy    *SMA and CF Testing information sheet    *CPT Code Sheet/MFM 13week NT pamphlet    *[employer]      SBIRT Screen:  Depression Screening Follow-up Plan: Patient's depression screening was negative with an [place] score of  13     Jessa is feeling a bit more anxious at this time  Referral for reji martínez  *[facility]   *yes discussed genetic testing- pt interested   Patient has been informed of basic prenatal advice such as avoiding alcohol, drugs, and smoking  She should remain hydrated and take daily prenatal vitamins   Patient should avoid caffeine, raw sprouts, high mercury fish, undercooked fish, raw eggs, organ meat, unwashed produce, and unpasteurized cheeses, milk, and fruit juice and undercooked meats  She has been informed about toxoplasmosis and to avoid cat feces  *Details that I feel the provider should be aware of:  Alexander Clay was seen for her ob intake at 9w5d  Prenatal panel ordered  PN1 visit scheduled for *11/18/2022  The patient was oriented to our practice and all questions were answered      Interviewed by:  Tana Roland

## 2022-10-29 LAB
HIV 1+2 AB+HIV1 P24 AG SERPL QL IA: NORMAL
RPR SER QL: NORMAL

## 2022-10-30 LAB — BACTERIA UR CULT: NORMAL

## 2022-11-17 NOTE — PROGRESS NOTES
Prenatal H&P     Denies loss of fluid, vaginal discharge, vaginal bleeding  and abdominal pain  Not feeling fetal movement  Tolerating PNV well  Prenatal panel reviewed - WNL  Plans for genetic screening appointment today  Unplanned pregnancy occurred with [place] IUD  OB history:     G1- current pregnant occurred with [place] IUD in place     Dating:     LMP - 8/21/22 TED 5/28/23     US on 10/18/22 @  8w 2d TED 5/28/23  Working TED 5/28/23     Surgical history:     tonsillectomy     Medical History:     Heart murmur, pulmonary valve stenosis, vertigo, ovarian cyst    Social history:     Alcohol/ tobacco/ illicit drug -no alcohol use since positive pregnancy test/no tobacco or drug use     Denies history of STD/STI     Work/ housing/ support - Manager, car dealership/ house- stable/  FOB, 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     PCP/ Dental- last PE 2/2021/ last cleaning 6 months ago - scheduled today      SBIRT- screen negative @ intake    She denies a hx of recent cough, chills, fever,  STD/STI, denies a personal history  of TB, COVID-19 and Zika virus or close contacts with persons with TB or COVID -19  She denies a family history of inheritable conditions such as physical or intellectual disabilities, birth defects, blood disorders, heart or neural tube defects  She has never had MRSA  She denies recent travel or travel planned in the near future  Patient Plans   - Feeding- breast   - Contraception- IUD   - [facility]ware office delivers at [facility]  If < 32 weeks will recommend evaluation at [address] [place] [address] St:  - Continue prenatal vitamin daily  - Genetic screening/ PNC  today   -  Weight gain in pregnancy-Who BMI recommend 15-25 lb  Healthy diet and daily exercise reviewed and encouraged  - Unit regimen reviewed visit every 4 weeks until 28 weeks, every 2 weeks until 36 weeks and then weekly until delivery      -  gonorrhea/ chlamydia collected   - Vaccines in Pregnancy      - TDAP vaccine after 27 gestational weeks     - Reviewed with patient  Pregnancy with COVID infection is considered  high risk and can have poor outcome including  delivery, more severe infection  therefore  pregnant patients are recommended to get  COVID-19 vaccination  Written information provided  Had vaccine x 2 and booster      - influenza October- March declined   -  Common discomforts of pregnancy and precautions reviewed  Signs and symptoms to report reviewed  Encouraged social distancing, good hand hygiene, masking, avoiding crowds and written information provided about COVID-19    RTO 4 weeks

## 2022-11-17 NOTE — PATIENT INSTRUCTIONS
Thank you for choosing us for your  care today  If you have any questions about your ultrasound or care, please do not hesitate to contact us or your primary obstetrician  Some general instructions for your pregnancy are:    Protect against coronavirus: get vaccinated - pregnant women are increased risk of severe COVID  Notify your primary care doctor if you have any symptoms  Exercise: Aim for 22 minutes per day (150 minutes per week) of regular exercise  Walking is great! Nutrition: aim for calcium-rich and iron-rich foods as well as healthy sources of protein  Learn about Preeclampsia: preeclampsia is a common, serious high blood pressure complication in pregnancy  A blood pressure of 121NSCP (systolic or top number) or 30JIQG (diastolic or bottom number) is not normal and needs evaluation by your doctor  Aspirin is sometimes prescribed in early pregnancy to prevent preeclampsia in women with risk factors - ask your obstetrician if you should be on this medication  If you smoke, try to reduce how many cigarettes you smoke or try to quit completely  Do not vape  Other warning signs to watch out for in pregnancy or postpartum: chest pain, obstructed breathing or shortness of breath, seizures, thoughts of hurting yourself or your baby, bleeding, a painful or swollen leg, fever, or headache (see [facility] POST-BIRTH Warning Signs campaign)  If these happen call 911  Itching is also not normal in pregnancy and if you experience this, especially over your hands and feet, potentially worse at night, notify your doctors

## 2022-11-18 ENCOUNTER — INITIAL PRENATAL (OUTPATIENT)
Dept: OBGYN CLINIC | Facility: MEDICAL CENTER | Age: 22
End: 2022-11-18

## 2022-11-18 ENCOUNTER — ROUTINE PRENATAL (OUTPATIENT)
Dept: PERINATAL CARE | Facility: CLINIC | Age: 22
End: 2022-11-18

## 2022-11-18 VITALS
HEIGHT: 63 IN | WEIGHT: 161.2 LBS | SYSTOLIC BLOOD PRESSURE: 102 MMHG | HEART RATE: 90 BPM | DIASTOLIC BLOOD PRESSURE: 64 MMHG | BODY MASS INDEX: 28.56 KG/M2

## 2022-11-18 VITALS
DIASTOLIC BLOOD PRESSURE: 70 MMHG | HEIGHT: 63 IN | WEIGHT: 160 LBS | BODY MASS INDEX: 28.35 KG/M2 | SYSTOLIC BLOOD PRESSURE: 128 MMHG

## 2022-11-18 DIAGNOSIS — Z36.82 ENCOUNTER FOR (NT) NUCHAL TRANSLUCENCY SCAN: ICD-10-CM

## 2022-11-18 DIAGNOSIS — Z11.3 ROUTINE SCREENING FOR STI (SEXUALLY TRANSMITTED INFECTION): ICD-10-CM

## 2022-11-18 DIAGNOSIS — Z34.91 PRENATAL CARE IN FIRST TRIMESTER: Primary | ICD-10-CM

## 2022-11-18 DIAGNOSIS — Z3A.08 8 WEEKS GESTATION OF PREGNANCY: ICD-10-CM

## 2022-11-18 DIAGNOSIS — D27.9 DERMOID CYST OF OVARY COMPLICATING PREGNANCY IN ANTEPARTUM PERIOD: Primary | ICD-10-CM

## 2022-11-18 DIAGNOSIS — Z3A.12 12 WEEKS GESTATION OF PREGNANCY: ICD-10-CM

## 2022-11-18 DIAGNOSIS — O34.80 DERMOID CYST OF OVARY COMPLICATING PREGNANCY IN ANTEPARTUM PERIOD: Primary | ICD-10-CM

## 2022-11-18 NOTE — LETTER
2022    BRANNON Smith  [address]  [address]    Patient: Shakira Byrne   YOB: 2000   Date of Visit: 2022   Gestational age Holly Adriano of this communication: Routine       Dear Will Damian,    This patient was seen recently in our  office  The content of my evaluation today is in the ultrasound report under "OB Procedures" tab  Please don't hesitate to contact our office with any concerns or questions       Sincerely,      Nacho Tirado MD  Attending Physician, [place]

## 2022-11-18 NOTE — LETTER
November 18, 2022     Patient: Teresa Roberts  YOB: 2000  Date of Visit: 11/18/2022      To Whom it May Concern:    Teresa Roberts is under my professional care  Dee Arrington was seen in my office on 11/18/2022  Dee Arrington may return to work on  11/18/22  We have had several requests from local dentist requesting permission to perform procedures on our patients who are pregnant  We wish to respond with this letter regarding some of the more routine procedures that we have been asked about  The following procedures may be performed on our obstetric patients:   1  Administration of local anesthesia   2  Administration of antibiotics such as PCN, Ampicillin, and Erythromycin  3  Administration of pain medications such as Tylenol, Tylenol with codeine, and if needed Percocet  4  Shielded X-rays  If you have any questions or concerns, please don't hesitate to call        Sincerely,       KYMBERLY Ribeiro        CC: No Recipients

## 2022-11-18 NOTE — PROGRESS NOTES
[address] [address]: Ms Guillaume Hameed was seen today for nuchal translucency ultrasound  See ultrasound report under "OB Procedures" tab  Review of Systems   Constitutional: Negative for chills, fever and unexpected weight change  HENT: Negative for congestion, dental problem, facial swelling and sore throat  Eyes: Negative for visual disturbance  Respiratory: Negative for cough and shortness of breath  Cardiovascular: Negative for chest pain and palpitations  Gastrointestinal: Negative for diarrhea and vomiting  Endocrine: Negative for polydipsia  Genitourinary: Negative for dysuria and vaginal bleeding  Musculoskeletal: Negative for back pain and joint swelling  Skin: Negative for rash and wound  Allergic/Immunologic: Negative for immunocompromised state  Neurological: Negative for seizures and headaches  Hematological: Does not bruise/bleed easily  Psychiatric/Behavioral: Negative for hallucinations and suicidal ideas  Physical Exam  Constitutional:       General: She is not in acute distress  Appearance: Normal appearance  She is not ill-appearing, toxic-appearing or diaphoretic  HENT:      Head: Normocephalic and atraumatic  Nose: No congestion or rhinorrhea  Eyes:      General: No scleral icterus  Right eye: No discharge  Left eye: No discharge  Extraocular Movements: Extraocular movements intact  Conjunctiva/sclera: Conjunctivae normal    Pulmonary:      Effort: Pulmonary effort is normal  No respiratory distress  Musculoskeletal:      Cervical back: Normal range of motion  Skin:     Coloration: Skin is not jaundiced or pale  Findings: No erythema, lesion or rash  Neurological:      General: No focal deficit present  Mental Status: She is alert and oriented to person, place, and time     Psychiatric:         Mood and Affect: Mood normal          Behavior: Behavior normal          Please don't hesitate to contact our office with any concerns or questions    Tina Santana MD

## 2022-11-19 LAB
C TRACH DNA SPEC QL NAA+PROBE: NEGATIVE
N GONORRHOEA DNA SPEC QL NAA+PROBE: NEGATIVE

## 2022-12-01 ENCOUNTER — TELEPHONE (OUTPATIENT)
Dept: PERINATAL CARE | Facility: CLINIC | Age: 22
End: 2022-12-01

## 2022-12-01 NOTE — TELEPHONE ENCOUNTER
Spoke with patient and confirmed her MFM appointment had to be rescheduled on 1/13/22 at 11 a m from [facility] to [facility]  Patient verbalized understanding of new time, date and location of appointment  Patient denies further questions

## 2022-12-14 ENCOUNTER — TELEPHONE (OUTPATIENT)
Dept: OBGYN CLINIC | Facility: MEDICAL CENTER | Age: 22
End: 2022-12-14

## 2022-12-14 ENCOUNTER — HOSPITAL ENCOUNTER (OUTPATIENT)
Facility: HOSPITAL | Age: 22
Discharge: HOME | End: 2022-12-14
Attending: OBSTETRICS & GYNECOLOGY | Admitting: OBSTETRICS & GYNECOLOGY

## 2022-12-14 NOTE — PROGRESS NOTES
L&D Triage Note - OB/GYN  Adalberto Garcia 20 y o  female MRN: [id number]  Unit/Bed#: L&D 321-01 Encounter: [id number]      Assessment:  20 y o   at [facility] presenting with spotting and abdominal soreness  Plan:  1  Spotting  - vaginal spotting x1 day  - SSE: white discharge noted, cervix is not friable, no blood noted  - Wet mount/KOH: negative for clue cells, trichomonas, and hyphae  - reassured patient that it may be normal progression of pregnancy and light blood while wiping could be from a multitude of things   - recommended returning to L&D if bleeding is increased like a period, if she thinks she may have broken her water, or if she is feeling regular contractions  ______________________________________________________________________      Chief Complaint: vaginal spotting    Subjective:  20 y o  Benton Sanchez at [facility] presents to L&D today for vaginal spotting and abdominal soreness for 1 day  She states that when she used the bathroom this morning and wiped she saw light pink on the toilet paper  She has not had any passing of clots or martha vaginal bleeding  No LOF and has not felt baby move yet this pregnancy besides "flutters here and there"  She has not recently been sexually active  She states that her abdominal pain is more of a pressure/ache rather than cramping  It is located in the lower abdomen bilaterally, however, more notable on the R  This is her first pregnancy and she has not had any complications thus far with HTN or increased blood glucose levels  She denies CP, SOB      ROS:   Review of Systems   Constitutional: Negative for chills and fever  Eyes: Negative for visual disturbance  Respiratory: Negative for cough and shortness of breath  Cardiovascular: Negative for chest pain and palpitations  Gastrointestinal: Positive for abdominal pain  Negative for nausea and vomiting  Genitourinary: Positive for vaginal bleeding and vaginal discharge   Negative for decreased urine volume, difficulty urinating, dysuria, frequency and hematuria  Musculoskeletal: Negative for arthralgias and back pain  Skin: Negative for color change and rash  Neurological: Negative for dizziness and headaches  Psychiatric/Behavioral: Negative for agitation and behavioral problems  All other systems reviewed and are negative  Objective: There were no vitals filed for this visit  Physical Exam  Constitutional:       General: She is not in acute distress  Appearance: She is normal weight  She is not toxic-appearing  HENT:      Head: Normocephalic and atraumatic  Nose: Nose normal       Mouth/Throat:      Mouth: Mucous membranes are moist    Eyes:      Extraocular Movements: Extraocular movements intact  Pupils: Pupils are equal, round, and reactive to light  Cardiovascular:      Rate and Rhythm: Normal rate  Pulmonary:      Effort: Pulmonary effort is normal    Abdominal:      Comments: Appropriate for gestation   Musculoskeletal:         General: No swelling  Right lower leg: No edema  Left lower leg: No edema  Neurological:      General: No focal deficit present  Mental Status: She is alert and oriented to person, place, and time  Skin:     General: Skin is warm and dry  Psychiatric:         Mood and Affect: Mood normal          Behavior: Behavior normal           SVE: 0 / 0% / -5  SSE: ite discharge noted, cervix is not friable, no blood noted  FHT:  149    Wet mount/KOH: no clue cells, no trichomonas, no hyphae noted      Anna Adkins 192, DO 12/14/2022 6:47 PM

## 2022-12-14 NOTE — TELEPHONE ENCOUNTER
Pt stated that she called this morning to say that she had spotting and was told to call back if it increased, when pt called the 2nd time she stated that the spotting stopped, but her cramping had started pt is 16 wks 3 days, on call Md was tiger text and told of situation, Md advised for pt to go to L&D,information was relayed to pt and pt was hesitant if they wanted to go, pt asked if this was something that could wait until their 12/16 appt , information was again relayed to on call Md Jermaine Zavala, who reiterated that she can choose to hold of,f but the recommendation is to go to L&D, pt was called back again and information was told to pt and pt stated that she would go to to [facility]

## 2022-12-16 ENCOUNTER — ROUTINE PRENATAL (OUTPATIENT)
Dept: OBGYN CLINIC | Facility: MEDICAL CENTER | Age: 22
End: 2022-12-16

## 2022-12-16 VITALS — WEIGHT: 164 LBS | DIASTOLIC BLOOD PRESSURE: 80 MMHG | BODY MASS INDEX: 29.05 KG/M2 | SYSTOLIC BLOOD PRESSURE: 130 MMHG

## 2022-12-16 DIAGNOSIS — Z34.92 PRENATAL CARE IN SECOND TRIMESTER: Primary | ICD-10-CM

## 2022-12-16 DIAGNOSIS — Z3A.16 16 WEEKS GESTATION OF PREGNANCY: ICD-10-CM

## 2022-12-16 NOTE — PROGRESS NOTES
Routine Prenatal Visit  [facility] [address] [place] [address]    Assessment/Plan:  Florence Cramer is a 20y o  year old  at 14w5d who presents for routine prenatal visit  1  Prenatal care in second trimester    2  16 weeks gestation of pregnancy  Assessment & Plan:  - Declines AFP          Subjective:     CC: Prenatal care    Valeri Centeno is a 20 y o  Maryanne Civatte female who presents for routine prenatal care at 16w5d  Pregnancy ROS: Denies leakage of fluid, pelvic pain, or vaginal bleeding  Reports normal fetal movement  The following portions of the patient's history were reviewed and updated as appropriate: allergies, current medications, past family history, past medical history, obstetric history, gynecologic history, past social history, past surgical history and problem list       Objective:  /80   Wt 74 4 kg (164 lb)   LMP 2022   BMI 29 05 kg/m²   Pregravid Weight/BMI: 71 7 kg (158 lb) (BMI 28 00)  Current Weight: 74 4 kg (164 lb)   Total Weight Gain: 2 722 kg (6 lb)   Pre-Buster Vitals    Flowsheet Row Most Recent Value   Prenatal Assessment    Prenatal Vitals    Blood Pressure 130/80   Weight - Scale 74 4 kg (164 lb)   Urine Albumin/Glucose    Dilation/Effacement/Station    Vaginal Drainage    Edema            General: Well appearing, no distress  Respiratory: Unlabored breathing  Cardiovascular: Regular rate  Abdomen: Soft, gravid, nontender  Fundal Height: Appropriate for gestational age  Extremities: Warm and well perfused  Non tender

## 2023-01-04 ENCOUNTER — TELEPHONE (OUTPATIENT)
Dept: OBGYN CLINIC | Facility: MEDICAL CENTER | Age: 23
End: 2023-01-04

## 2023-01-04 NOTE — TELEPHONE ENCOUNTER
Patient called stating that she needs a proof of pregnancy letter for the [facility] office  Patient asked if letter could be put in [address]  Proof of Pregnancy letter placed in [address]

## 2023-01-10 ENCOUNTER — ROUTINE PRENATAL (OUTPATIENT)
Dept: OBGYN CLINIC | Facility: MEDICAL CENTER | Age: 23
End: 2023-01-10

## 2023-01-10 VITALS — SYSTOLIC BLOOD PRESSURE: 132 MMHG | BODY MASS INDEX: 30.47 KG/M2 | DIASTOLIC BLOOD PRESSURE: 84 MMHG | WEIGHT: 172 LBS

## 2023-01-10 DIAGNOSIS — D27.0 DERMOID CYST OF RIGHT OVARY: ICD-10-CM

## 2023-01-10 DIAGNOSIS — Z3A.20 20 WEEKS GESTATION OF PREGNANCY: Primary | ICD-10-CM

## 2023-01-10 NOTE — PROGRESS NOTES
Osborne Lefort is a 20y o  year old  at 22w3d for routine prenatal visit    + FM, no vaginal bleeding, contractions, or LOF  Complaints: Yes - rash in her chest / we discussed hydrocortisone application to area    Most recent ultrasound and labs reviewed    Has level 2 scheduled  This Friday   Declined MSAFP   Dermoid cyst  - no pain

## 2023-01-12 NOTE — PROGRESS NOTES
Please refer to the [facility] ultrasound report in Ob Procedures for additional information regarding today's visit

## 2023-01-13 ENCOUNTER — ROUTINE PRENATAL (OUTPATIENT)
Facility: HOSPITAL | Age: 23
End: 2023-01-13
Attending: OBSTETRICS & GYNECOLOGY

## 2023-01-13 VITALS
HEIGHT: 63 IN | DIASTOLIC BLOOD PRESSURE: 88 MMHG | HEART RATE: 91 BPM | WEIGHT: 171 LBS | BODY MASS INDEX: 30.3 KG/M2 | SYSTOLIC BLOOD PRESSURE: 134 MMHG

## 2023-01-13 DIAGNOSIS — Z36.86 ENCOUNTER FOR ANTENATAL SCREENING FOR CERVICAL LENGTH: ICD-10-CM

## 2023-01-13 DIAGNOSIS — O35.2XX0 HEREDITARY FAMILIAL DISEASE AFFECTING MANAGEMENT OF MOTHER AND POSSIBLY AFFECTING FETUS, ANTEPARTUM, SINGLE OR UNSPECIFIED FETUS: Primary | ICD-10-CM

## 2023-01-13 DIAGNOSIS — Q25.6 CONGENITAL PULMONARY STENOSIS: ICD-10-CM

## 2023-01-13 DIAGNOSIS — Z36.3 ENCOUNTER FOR ANTENATAL SCREENING FOR MALFORMATIONS: ICD-10-CM

## 2023-01-13 DIAGNOSIS — Z3A.20 20 WEEKS GESTATION OF PREGNANCY: ICD-10-CM

## 2023-01-13 NOTE — LETTER
January 14, 2023     Viki Wise MD  [address]     Patient: Ted Boo   YOB: 2000   Date of Visit: 1/13/2023       Dear Dr Kadeem Castelan: Thank you for referring Ted Boo to me for evaluation  Below are my notes for this consultation  If you have questions, please do not hesitate to call me  I look forward to following your patient along with you  Sincerely,        Marta Avalos MD        CC: No Recipients  Marta Avalos MD  1/12/2023  5:21 PM  Sign when Signing Visit   Please refer to the [facility] ultrasound report in Ob Procedures for additional information regarding today's visit

## 2023-01-13 NOTE — PROGRESS NOTES
Ultrasound Probe Disinfection    A transvaginal ultrasound was performed  Prior to use, disinfection was performed with High Level Disinfection Process (Trophon)  Probe serial number [id number]: [id number] was used        Girish Wilder  01/13/23  11:08 AM

## 2023-01-14 PROBLEM — Q25.6 CONGENITAL PULMONARY STENOSIS: Status: ACTIVE | Noted: 2023-01-14

## 2023-01-14 PROBLEM — O35.2XX0 HEREDITARY DISEASE IN FAMILY POSSIBLY AFFECTING FETUS, AFFECTING MANAGEMENT OF MOTHER, ANTEPARTUM CONDITION OR COMPLICATION: Status: ACTIVE | Noted: 2023-01-14

## 2023-01-24 ENCOUNTER — ROUTINE PRENATAL (OUTPATIENT)
Dept: PEDIATRIC CARDIOLOGY | Facility: CLINIC | Age: 23
End: 2023-01-24

## 2023-01-24 DIAGNOSIS — Z36.89 NORMAL FETAL CARDIAC EXAM: Primary | ICD-10-CM

## 2023-01-24 NOTE — PROGRESS NOTES
Fetal Cardiology Consult    Date of Visit:    2023  Gestational Age:  22w2d   Estimated Date of Delivery: 23   Referring provider:                 Steven Campos     Reason for consultation: maternal pulmonary stenosis    Fetal Echocardiogram demonstrated normal cardiac anatomy and function  I reviewed the normal findings  We also discussed, that due to the technical limitations of fetal echocardiography and the nature of fetal circulation, a number of cardiovascular defects cannot be definitively ruled out at this stage  Examples include but are not limited to: ASD, PDA, small VSD, coarctation of the aorta, partial anomalous pulmonary venous connection  Please see full echocardiogram report under OB procedures  Assessment and Recommendations:  -Normal fetal cardiac anatomy and function    -Normal  care and prenatal care are recommended     -There is no follow-up needed  I spent 60minutes - on the day of service - reviewing the patient's chart, reviewing previous imaging studies, counseling the patient about the fetal findings, coordinating care, and documenting care  Crescencio Aldridge MD  Pediatric Cardiology  [address] [place] [address]  Fax: [phone]  [email or web address]

## 2023-02-06 PROBLEM — Z3A.24 24 WEEKS GESTATION OF PREGNANCY: Status: ACTIVE | Noted: 2022-11-18

## 2023-02-06 NOTE — PROGRESS NOTES
Routine Prenatal Visit  [facility] [address] [place] [address]    Assessment/Plan:  Bijan Kenney is a 20y o  year old  at 25w3d who presents for routine prenatal visit  1  24 weeks gestation of pregnancy  Assessment & Plan:  28 week next visit     NIPT - low risk   Level 2 reviewed   Next scan- 23      2  Nonrheumatic pulmonary valve stenosis  Assessment & Plan:  Per cardiology      Echo 2019   Mild pulmonary stenosis with peak gradient 22 mmHg  - Peak gradient < 30 mmHg  Needs repeat Echo and EKG in 5 years (2024) or earlier if symptomatic      Saw pediatric cardiology   ECHO -  23 - normal       3  Congenital pulmonary stenosis  Assessment & Plan:  Fetal cardiology - echo is normal - by peds Dr Dionicio Baez       4  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 and possibly affecting fetus, antepartum, single or unspecified fetus        Subjective:     CC: Prenatal care    Shell Hines is a 20 y o   female who presents for routine prenatal care at 24w1d  Pregnancy ROS: no leakage of fluid, pelvic pain, or vaginal bleeding  yes fetal movement      The following portions of the patient's history were reviewed and updated as appropriate: allergies, current medications, past family history, past medical history, obstetric history, gynecologic history, past social history, past surgical history and problem list       Objective:  /70   Wt 80 5 kg (177 lb 8 oz)   LMP 2022   BMI 31 44 kg/m²   Pregravid Weight/BMI: 71 7 kg (158 lb) (BMI 28 00)  Current Weight: 80 5 kg (177 lb 8 oz)   Total Weight Gain: 8 845 kg (19 lb 8 oz)   Pre-Buster Vitals    Flowsheet Row Most Recent Value   Prenatal Assessment    Fetal Heart Rate 152   Movement Present   Prenatal Vitals    Blood Pressure 124/70   Weight - Scale 80 5 kg (177 lb 8 oz)   Urine Albumin/Glucose    Dilation/Effacement/Station    Vaginal Drainage    Edema    LLE Edema None   RLE Edema None           General: Well appearing, no distress  Respiratory: Unlabored breathing  Cardiovascular: Regular rate  Abdomen: Soft, gravid, nontender  Fundal Height: Appropriate for gestational age  Extremities: Warm and well perfused  Non tender

## 2023-02-06 NOTE — ASSESSMENT & PLAN NOTE
Per cardiology      Echo 8/1/2019   Mild pulmonary stenosis with peak gradient 22 mmHg  - Peak gradient < 30 mmHg   Needs repeat Echo and EKG in 5 years (August 2024) or earlier if symptomatic      Saw pediatric cardiology   ECHO -  1/24/23 - normal

## 2023-02-07 ENCOUNTER — ROUTINE PRENATAL (OUTPATIENT)
Dept: OBGYN CLINIC | Facility: MEDICAL CENTER | Age: 23
End: 2023-02-07

## 2023-02-07 VITALS — BODY MASS INDEX: 31.44 KG/M2 | WEIGHT: 177.5 LBS | SYSTOLIC BLOOD PRESSURE: 124 MMHG | DIASTOLIC BLOOD PRESSURE: 70 MMHG

## 2023-02-07 DIAGNOSIS — O35.2XX0 HEREDITARY FAMILIAL DISEASE AFFECTING MANAGEMENT OF MOTHER AND POSSIBLY AFFECTING FETUS, ANTEPARTUM, SINGLE OR UNSPECIFIED FETUS: ICD-10-CM

## 2023-02-07 DIAGNOSIS — I37.0 NONRHEUMATIC PULMONARY VALVE STENOSIS: ICD-10-CM

## 2023-02-07 DIAGNOSIS — Z3A.24 24 WEEKS GESTATION OF PREGNANCY: Primary | ICD-10-CM

## 2023-02-07 DIAGNOSIS — Q25.6 CONGENITAL PULMONARY STENOSIS: ICD-10-CM

## 2023-03-06 PROBLEM — Z3A.28 28 WEEKS GESTATION OF PREGNANCY: Status: ACTIVE | Noted: 2022-11-18

## 2023-03-10 ENCOUNTER — ROUTINE PRENATAL (OUTPATIENT)
Dept: OBGYN CLINIC | Facility: MEDICAL CENTER | Age: 23
End: 2023-03-10

## 2023-03-10 VITALS
SYSTOLIC BLOOD PRESSURE: 125 MMHG | BODY MASS INDEX: 32.07 KG/M2 | HEIGHT: 63 IN | WEIGHT: 181 LBS | DIASTOLIC BLOOD PRESSURE: 70 MMHG

## 2023-03-10 DIAGNOSIS — Z3A.28 28 WEEKS GESTATION OF PREGNANCY: ICD-10-CM

## 2023-03-10 DIAGNOSIS — Z34.93 PRENATAL CARE IN THIRD TRIMESTER: Primary | ICD-10-CM

## 2023-03-10 LAB
BASOPHILS # BLD AUTO: 0.05 THOUSANDS/ÂΜL (ref 0–0.1)
BASOPHILS NFR BLD AUTO: 0 % (ref 0–1)
DME PARACHUTE DELIVERY DATE REQUESTED: NORMAL
DME PARACHUTE ITEM DESCRIPTION: NORMAL
DME PARACHUTE ORDER STATUS: NORMAL
DME PARACHUTE SUPPLIER NAME: NORMAL
DME PARACHUTE SUPPLIER PHONE: NORMAL
EOSINOPHIL # BLD AUTO: 0.1 THOUSAND/ÂΜL (ref 0–0.61)
EOSINOPHIL NFR BLD AUTO: 1 % (ref 0–6)
ERYTHROCYTE [DISTWIDTH] IN BLOOD BY AUTOMATED COUNT: 12.7 % (ref 11.6–15.1)
FERRITIN SERPL-MCNC: 21 NG/ML (ref 8–388)
GLUCOSE 1H P 50 G GLC PO SERPL-MCNC: 120 MG/DL (ref 40–134)
HCT VFR BLD AUTO: 30.3 % (ref 34.8–46.1)
HGB BLD-MCNC: 9.8 G/DL (ref 11.5–15.4)
IMM GRANULOCYTES # BLD AUTO: 0.04 THOUSAND/UL (ref 0–0.2)
IMM GRANULOCYTES NFR BLD AUTO: 0 % (ref 0–2)
LYMPHOCYTES # BLD AUTO: 1.56 THOUSANDS/ÂΜL (ref 0.6–4.47)
LYMPHOCYTES NFR BLD AUTO: 12 % (ref 14–44)
MCH RBC QN AUTO: 31.8 PG (ref 26.8–34.3)
MCHC RBC AUTO-ENTMCNC: 32.3 G/DL (ref 31.4–37.4)
MCV RBC AUTO: 98 FL (ref 82–98)
MONOCYTES # BLD AUTO: 0.7 THOUSAND/ÂΜL (ref 0.17–1.22)
MONOCYTES NFR BLD AUTO: 6 % (ref 4–12)
NEUTROPHILS # BLD AUTO: 10.28 THOUSANDS/ÂΜL (ref 1.85–7.62)
NEUTS SEG NFR BLD AUTO: 81 % (ref 43–75)
NRBC BLD AUTO-RTO: 0 /100 WBCS
PLATELET # BLD AUTO: 199 THOUSANDS/UL (ref 149–390)
PMV BLD AUTO: 13.3 FL (ref 8.9–12.7)
RBC # BLD AUTO: 3.08 MILLION/UL (ref 3.81–5.12)
WBC # BLD AUTO: 12.73 THOUSAND/UL (ref 4.31–10.16)

## 2023-03-10 NOTE — PROGRESS NOTES
Denies loss of fluid, vaginal bleeding and abdominal pain  Confirms frequent fetal movement  Tolerating prenatal vitamin well  Patient plans include breast-feeding, epidural as needed for pain control during labor, hormonal IUD for contraception and uncertain regarding pediatrician  BP: 125/70 weight: + 23lbs  Plan  -Continue prenatal vitamins daily  -Fetal kick counts reviewed, encouraged daily and written information provided  -Considering Tdap vaccine  -28-week labs drawn today  Blood type is O+ does not need RhoGAM   28-week folder provided and reviewed breast pump ordered and [facility] for assistance with choosing pediatrician  -Follow-up  center scheduled for 23  -Common discomforts of pregnancy and precautions including  labor reviewed  Signs and symptoms to report reviewed    Written information provided about COVID-19  RTO 2 weeks f/u Labs & tdap

## 2023-03-12 DIAGNOSIS — Z3A.29 29 WEEKS GESTATION OF PREGNANCY: ICD-10-CM

## 2023-03-12 DIAGNOSIS — O99.013 ANEMIA DURING PREGNANCY IN THIRD TRIMESTER: Primary | ICD-10-CM

## 2023-03-12 RX ORDER — FERROUS SULFATE TAB EC 324 MG (65 MG FE EQUIVALENT) 324 (65 FE) MG
324 TABLET DELAYED RESPONSE ORAL EVERY OTHER DAY
Qty: 90 TABLET | Refills: 1 | Status: SHIPPED | OUTPATIENT
Start: 2023-03-12 | End: 2023-06-27

## 2023-03-21 LAB
DME PARACHUTE DELIVERY DATE ACTUAL: NORMAL
DME PARACHUTE DELIVERY DATE REQUESTED: NORMAL
DME PARACHUTE ITEM DESCRIPTION: NORMAL
DME PARACHUTE ORDER STATUS: NORMAL
DME PARACHUTE SUPPLIER NAME: NORMAL
DME PARACHUTE SUPPLIER PHONE: NORMAL

## 2023-03-22 PROBLEM — O99.013 ANEMIA DURING PREGNANCY IN THIRD TRIMESTER: Status: ACTIVE | Noted: 2023-03-22

## 2023-03-22 PROBLEM — Z3A.30 30 WEEKS GESTATION OF PREGNANCY: Status: ACTIVE | Noted: 2022-11-18

## 2023-03-24 ENCOUNTER — ROUTINE PRENATAL (OUTPATIENT)
Dept: OBGYN CLINIC | Facility: MEDICAL CENTER | Age: 23
End: 2023-03-24

## 2023-03-24 VITALS
SYSTOLIC BLOOD PRESSURE: 134 MMHG | HEIGHT: 63 IN | BODY MASS INDEX: 32.6 KG/M2 | WEIGHT: 184 LBS | DIASTOLIC BLOOD PRESSURE: 84 MMHG

## 2023-03-24 DIAGNOSIS — O35.2XX0 HEREDITARY FAMILIAL DISEASE AFFECTING MANAGEMENT OF MOTHER AND POSSIBLY AFFECTING FETUS, ANTEPARTUM, SINGLE OR UNSPECIFIED FETUS: Primary | ICD-10-CM

## 2023-03-24 DIAGNOSIS — O99.013 ANEMIA DURING PREGNANCY IN THIRD TRIMESTER: ICD-10-CM

## 2023-03-24 DIAGNOSIS — Z3A.30 30 WEEKS GESTATION OF PREGNANCY: ICD-10-CM

## 2023-03-24 NOTE — PROGRESS NOTES
Denies loss of fluid, vaginal bleeding and abdominal pain  Confirms frequent fetal movement  Doing fetal kick counts  Tolerating prenatal vitamin and iron well  Reviewed recommendation for Tdap vaccine, declines  Reviewed recommendation for repeat CBC and iron level  Patient verbalizes understanding  BP: 134/84 weight: + 26lbs  Plan  -Continue prenatal vitamin daily  -Continue fetal kick counts daily  -Anemia in pregnancy continue iron every other day on an empty stomach with orange juice  Encouraged to complete labs 4 weeks after starting iron  -Follow-up with  center scheduled for 23  -Birth plan reviewed no special requests  -Common discomforts of pregnancy and precautions including  labor reviewed  Signs and symptoms to report reviewed    Written information provided about COVID-19  RTO 2 weeks

## 2023-04-07 ENCOUNTER — ULTRASOUND (OUTPATIENT)
Dept: PERINATAL CARE | Facility: CLINIC | Age: 23
End: 2023-04-07

## 2023-04-07 ENCOUNTER — ROUTINE PRENATAL (OUTPATIENT)
Dept: OBGYN CLINIC | Facility: MEDICAL CENTER | Age: 23
End: 2023-04-07

## 2023-04-07 VITALS
BODY MASS INDEX: 33.66 KG/M2 | WEIGHT: 190 LBS | DIASTOLIC BLOOD PRESSURE: 90 MMHG | SYSTOLIC BLOOD PRESSURE: 130 MMHG | HEART RATE: 99 BPM | HEIGHT: 63 IN

## 2023-04-07 VITALS — BODY MASS INDEX: 33.83 KG/M2 | SYSTOLIC BLOOD PRESSURE: 128 MMHG | DIASTOLIC BLOOD PRESSURE: 80 MMHG | WEIGHT: 191 LBS

## 2023-04-07 DIAGNOSIS — Q25.6 CONGENITAL PULMONARY STENOSIS: ICD-10-CM

## 2023-04-07 DIAGNOSIS — O16.3 ELEVATED BLOOD PRESSURE AFFECTING PREGNANCY IN THIRD TRIMESTER, ANTEPARTUM: ICD-10-CM

## 2023-04-07 DIAGNOSIS — Z3A.32 32 WEEKS GESTATION OF PREGNANCY: ICD-10-CM

## 2023-04-07 DIAGNOSIS — O99.013 ANEMIA DURING PREGNANCY IN THIRD TRIMESTER: Primary | ICD-10-CM

## 2023-04-07 DIAGNOSIS — R03.0 ELEVATED BP WITHOUT DIAGNOSIS OF HYPERTENSION: ICD-10-CM

## 2023-04-07 DIAGNOSIS — Z3A.32 32 WEEKS GESTATION OF PREGNANCY: Primary | ICD-10-CM

## 2023-04-07 NOTE — PATIENT INSTRUCTIONS
Fetal Movement   WHAT YOU NEED TO KNOW:   Fetal movements are the kicks, rolls, and hiccups of your unborn baby  You may start to feel these movements when you are 20 weeks pregnant  The movements grow stronger and more frequent as your baby grows  Fetal movements show that your unborn baby is getting the oxygen and nutrients he or she needs before birth  Fewer fetal movements may signal a problem with your baby's health  DISCHARGE INSTRUCTIONS:   Follow up with your doctor or obstetrician as directed:  Write down your questions so you remember to ask them during your visits  Normal fetal movement:  Fetal activity can be described by 4 states, from least to most active  During quiet sleep, your unborn baby may be still for up to 2 hours  During active sleep, he or she kicks, rolls, and moves often  During the quiet awake state, he or she may only move his or her eyes  The active awake state includes strong kicks and rolls  What affects fetal movement:  You may feel your baby move more after you eat, or after you drink caffeine  You may feel your baby move less while you are more active, such as when you exercise  You may also feel fewer movements if you are obese  Certain medicines can change your baby's movements  Tell your healthcare provider about the medicines you are taking  Track fetal movements at home:  Fetal movement is most often felt when you lie quietly on your side  Your healthcare provider may ask you to count movements for 2 hours  He or she may ask you to track how long it takes for your baby to move 10 times  Keep a log of your baby's movements  Contact your doctor or obstetrician if:   It takes longer than usual to feel 8 of your unborn baby's movements  You do not feel your unborn baby move at least 10 times in 2 hours  The skin on your hands, feet, and around your eyes is more swollen than usual     You have a headache for at least 24 hours      Tiny red dots appear on your skin     Your belly is tender when you press on it  You have questions or concerns about your condition or care  Return to the emergency department if:   You do not feel your unborn baby move for 12 hours  You feel cramping or constant pain in your abdomen  You have heavy bleeding from your vagina  You have a severe headache and cannot see clearly  You are having trouble breathing or are vomiting  You have a seizure  © Copyright Kanchan Belle 2022 Information is for End User's use only and may not be sold, redistributed or otherwise used for commercial purposes  The above information is an  only  It is not intended as medical advice for individual conditions or treatments  Talk to your doctor, nurse or pharmacist before following any medical regimen to see if it is safe and effective for you  Early Labor Signs   WHAT YOU NEED TO KNOW:   Early labor signs can happen weeks, days, or hours before your baby is ready to be delivered  You may have false labor signs, which are also called [place] contractions  False labor is common and may happen several weeks or days before your actual labor  The contractions are not regular, and do not get closer together  The pain is usually mild, does not worsen, and is felt only in front  [place] contractions may happen later in the day, and stop after you change position, walk, or rest   DISCHARGE INSTRUCTIONS:   Call 911 for any of the following: You have heavy vaginal bleeding  You cannot get to the hospital before the baby starts to come out  Return to the emergency department if:   You have regular, painful contractions that are less than 5 minutes apart and last 30 to 70 seconds each  You have a constant trickle or sudden gush of clear fluid from your vagina  You notice a sudden decrease in your baby's movement      Contact your obstetrician or healthcare provider if:   You have pain in your lower back or abdomen that does not get better when you change positions  You have bloody mucus or show  You have questions or concerns about your condition or care  Early Labor signs and symptoms:   Lightening  occurs when your baby drops inside your pelvis  You may feel increased pressure in your pelvis  This may happen a few weeks to a few hours before your labor begins  Contractions  are cramps and tightening that occur in your uterus to help move the baby through your birth canal  Contractions occur regularly and more often each time  Each one lasts about 30 to 70 seconds, and gets stronger until you deliver your baby  Contractions do not go away with movement  The pain usually starts in your lower back and moves to your abdomen  Effacement  occurs when your cervix softens and thins, so it can easily open for the baby  You will not be able to feel effacement  Your healthcare provider will examine your cervix for effacement  Dilation  is widening of your cervix  Your healthcare provider will examine your cervix for dilation  Your cervix may start to dilate weeks before your baby is delivered  Your cervix will be fully opened and ready for delivery when it is dilated to 10 centimeters  Increased discharge  from your vagina may occur  It may be brown, pink, clear, or slightly bloody  This discharge may also be called bloody show  Bloody show is a mucus plug that forms and blocks your cervix during pregnancy  The discharge may mean that your cervix is opening up and getting ready for delivery  Rupture of membranes  is a sudden release of clear fluid from your vagina  Ruptured membranes means your water broke  Your healthcare provider may need to break your water if it does not happen on its own  © Copyright Mariel Orlando 2022 Information is for End User's use only and may not be sold, redistributed or otherwise used for commercial purposes  The above information is an  only   It is not intended as medical advice for individual conditions or treatments  Talk to your doctor, nurse or pharmacist before following any medical regimen to see if it is safe and effective for you

## 2023-04-07 NOTE — LETTER
April 7, 2023     BRANNON Quintanilla  [address]  [address] [facility]    Patient: Becky Lord   YOB: 2000   Date of Visit: 4/7/2023       Dear Dr Holley Ovalle: Thank you for referring Becky Lord to me for evaluation  Below are my notes for this consultation  If you have questions, please do not hesitate to call me  I look forward to following your patient along with you  Sincerely,        Xu Morales MD        CC: No Recipients  Xu Morales MD  4/7/2023 10:40 AM  Sign when Signing Visit  A fetal ultrasound was completed  See Ob procedures in Epic for an interpretation and recommendations  Do not hesitate to contact us in [facility] if you have questions  Eugenia Fallon MD, [address] [facility]  Maternal Fetal Medicine

## 2023-04-07 NOTE — PROGRESS NOTES
Routine Prenatal Visit  [facility] [address] [place] [address], [address]    Assessment/Plan:  Daiana Meyers is a 20y o  year old  at [address] [facility] who presents for routine prenatal visit  1  32 weeks gestation of pregnancy  -     CBC and differential; Future  -     Comprehensive metabolic panel; Future  -     Uric acid; Future  -     Protein / creatinine ratio, urine; Future    2  Elevated blood pressure affecting pregnancy in third trimester, antepartum    3  Elevated BP without diagnosis of hypertension  -     CBC and differential; Future  -     Comprehensive metabolic panel; Future  -     Uric acid; Future  -     Protein / creatinine ratio, urine; Future          Subjective:     CC: Prenatal care    Jeff Negrete is a 20 y o   female who presents for routine prenatal care at [address] [facility]  Pregnancy ROS: no leakage of fluid, pelvic pain, or vaginal bleeding   + fetal movement  Discussed [facility] recommendation to follow up with cardiology and ECHO, declined at this time  States she was told in the past that she does not need to follow up  Recent fetal echo normal   We reviewed signs/symptoms of pre-eclampsia   Patient had elevated BP at [facility] today, repeat normal  Labs ordered    The following portions of the patient's history were reviewed and updated as appropriate: allergies, current medications, past family history, past medical history, obstetric history, gynecologic history, past social history, past surgical history and problem list       Objective:  /80   Wt 86 6 kg (191 lb)   LMP 2022   BMI 33 83 kg/m²   Pregravid Weight/BMI: 71 7 kg (158 lb) (BMI 28 00)  Current Weight: 86 6 kg (191 lb)   Total Weight Gain: 15 kg (33 lb)   Pre- Vitals    Flowsheet Row Most Recent Value   Prenatal Assessment    Fetal Heart Rate 145   Movement Present   Prenatal Vitals    Blood Pressure 128/80   Weight - Scale 86 6 kg (191 lb)   Urine Albumin/Glucose    Dilation/Effacement/Station Vaginal Drainage    Edema    LLE Edema Trace   RLE Edema Trace   Facial Edema None           General: Well appearing, no distress  Respiratory: Unlabored breathing  Cardiovascular: Regular rate  Abdomen: Soft, gravid, nontender  Fundal Height: Appropriate for gestational age  Extremities: Warm and well perfused  Non tender

## 2023-04-10 PROBLEM — D69.6 THROMBOCYTOPENIA AFFECTING PREGNANCY, ANTEPARTUM (HCC): Status: ACTIVE | Noted: 2023-04-10

## 2023-04-10 PROBLEM — O99.119 THROMBOCYTOPENIA AFFECTING PREGNANCY, ANTEPARTUM (HCC): Status: ACTIVE | Noted: 2023-04-10

## 2023-04-18 PROBLEM — Z3A.34 34 WEEKS GESTATION OF PREGNANCY: Status: ACTIVE | Noted: 2022-11-18

## 2023-04-18 PROBLEM — O13.9 GESTATIONAL HYPERTENSION: Status: ACTIVE | Noted: 2023-04-07

## 2023-04-24 ENCOUNTER — TELEPHONE (OUTPATIENT)
Dept: OBGYN CLINIC | Facility: MEDICAL CENTER | Age: 23
End: 2023-04-24

## 2023-04-24 NOTE — TELEPHONE ENCOUNTER
----- Message from Brock Yousif MD sent at 4/18/2023  6:13 PM EDT -----  Regarding: schedule IOL  Daiana Meyers has a new dx of GHTN and needs induction in 37th week  Please schedule PM induction

## 2023-04-26 ENCOUNTER — TELEPHONE (OUTPATIENT)
Dept: OBGYN CLINIC | Facility: MEDICAL CENTER | Age: 23
End: 2023-04-26

## 2023-04-26 NOTE — TELEPHONE ENCOUNTER
----- Message from Elan Prado RN sent at 4/20/2023  8:53 AM EDT -----  Regarding: FW: Preeclampsia   Contact: [phone]  Please advise  Thank you!  ----- Message -----  From: Troy Pedro MD  Sent: 4/20/2023   8:47 AM EDT  To: Elan Prado RN  Subject: RE: Preeclampsia                                 Please schedule for 37wk IOL for GHTN  Apologies, thought I sent this through Friday     ----- Message -----  From: Elan Prado RN  Sent: 4/20/2023   8:44 AM EDT  To: Troy Pedro MD  Subject: FW: Preeclampsia                                 Please advise  Thank you !  ----- Message -----  From: Solange Berkowitz  Sent: 4/19/2023   5:01 PM EDT  To: [facility]  Subject: Preeclampsia                                     Dr Rui Tapia,   After my test results coming back negative will I still be delivering at 37 weeks? I know they've came back negative before I just want to plan things in advanced  Please let me know at your earliest convenience       Thank You!!

## 2023-04-28 ENCOUNTER — ROUTINE PRENATAL (OUTPATIENT)
Dept: OBGYN CLINIC | Facility: MEDICAL CENTER | Age: 23
End: 2023-04-28

## 2023-04-28 VITALS — WEIGHT: 197.1 LBS | BODY MASS INDEX: 34.91 KG/M2 | DIASTOLIC BLOOD PRESSURE: 70 MMHG | SYSTOLIC BLOOD PRESSURE: 110 MMHG

## 2023-04-28 DIAGNOSIS — Z34.03 ENCOUNTER FOR SUPERVISION OF NORMAL FIRST PREGNANCY IN THIRD TRIMESTER: Primary | ICD-10-CM

## 2023-04-28 DIAGNOSIS — O13.3 GESTATIONAL HYPERTENSION, THIRD TRIMESTER: Primary | ICD-10-CM

## 2023-04-28 DIAGNOSIS — Z3A.35 35 WEEKS GESTATION OF PREGNANCY: ICD-10-CM

## 2023-04-28 DIAGNOSIS — D69.6 THROMBOCYTOPENIA AFFECTING PREGNANCY, ANTEPARTUM (HCC): ICD-10-CM

## 2023-04-28 DIAGNOSIS — O99.119 THROMBOCYTOPENIA AFFECTING PREGNANCY, ANTEPARTUM (HCC): ICD-10-CM

## 2023-04-28 DIAGNOSIS — O99.013 ANEMIA DURING PREGNANCY IN THIRD TRIMESTER: ICD-10-CM

## 2023-04-28 DIAGNOSIS — O13.3 GESTATIONAL HYPERTENSION, THIRD TRIMESTER: ICD-10-CM

## 2023-04-28 NOTE — PROGRESS NOTES
04/28/23 1101   Nonstress Test   Reason for NST gHTN   Variability 6-25 BPM   Decelerations None   Accelerations Yes   Acoustic Stimulator No   Baseline 140 BPM   ERIN (If Indicated) (cm) 9 1 cm   Uterine Irritability No   Contractions Not present   Interpretation   Nonstress Test Interpretation Reactive   Overall Impression Reassuring

## 2023-04-28 NOTE — PATIENT INSTRUCTIONS
Fetal Movement   WHAT YOU NEED TO KNOW:   Fetal movements are the kicks, rolls, and hiccups of your unborn baby  You may start to feel these movements when you are 20 weeks pregnant  The movements grow stronger and more frequent as your baby grows  Fetal movements show that your unborn baby is getting the oxygen and nutrients he or she needs before birth  Fewer fetal movements may signal a problem with your baby's health  DISCHARGE INSTRUCTIONS:   Follow up with your doctor or obstetrician as directed:  Write down your questions so you remember to ask them during your visits  Normal fetal movement:  Fetal activity can be described by 4 states, from least to most active  During quiet sleep, your unborn baby may be still for up to 2 hours  During active sleep, he or she kicks, rolls, and moves often  During the quiet awake state, he or she may only move his or her eyes  The active awake state includes strong kicks and rolls  What affects fetal movement:  You may feel your baby move more after you eat, or after you drink caffeine  You may feel your baby move less while you are more active, such as when you exercise  You may also feel fewer movements if you are obese  Certain medicines can change your baby's movements  Tell your healthcare provider about the medicines you are taking  Track fetal movements at home:  Fetal movement is most often felt when you lie quietly on your side  Your healthcare provider may ask you to count movements for 2 hours  He or she may ask you to track how long it takes for your baby to move 10 times  Keep a log of your baby's movements  Contact your doctor or obstetrician if:   It takes longer than usual to feel 8 of your unborn baby's movements  You do not feel your unborn baby move at least 10 times in 2 hours  The skin on your hands, feet, and around your eyes is more swollen than usual     You have a headache for at least 24 hours      Tiny red dots appear on your skin     Your belly is tender when you press on it  You have questions or concerns about your condition or care  Return to the emergency department if:   You do not feel your unborn baby move for 12 hours  You feel cramping or constant pain in your abdomen  You have heavy bleeding from your vagina  You have a severe headache and cannot see clearly  You are having trouble breathing or are vomiting  You have a seizure  © Copyright Janes Carmen 2022 Information is for End User's use only and may not be sold, redistributed or otherwise used for commercial purposes  The above information is an  only  It is not intended as medical advice for individual conditions or treatments  Talk to your doctor, nurse or pharmacist before following any medical regimen to see if it is safe and effective for you  Early Labor Signs   WHAT YOU NEED TO KNOW:   Early labor signs can happen weeks, days, or hours before your baby is ready to be delivered  You may have false labor signs, which are also called [place] contractions  False labor is common and may happen several weeks or days before your actual labor  The contractions are not regular, and do not get closer together  The pain is usually mild, does not worsen, and is felt only in front  [place] contractions may happen later in the day, and stop after you change position, walk, or rest   DISCHARGE INSTRUCTIONS:   Call 911 for any of the following: You have heavy vaginal bleeding  You cannot get to the hospital before the baby starts to come out  Return to the emergency department if:   You have regular, painful contractions that are less than 5 minutes apart and last 30 to 70 seconds each  You have a constant trickle or sudden gush of clear fluid from your vagina  You notice a sudden decrease in your baby's movement      Contact your obstetrician or healthcare provider if:   You have pain in your lower back or abdomen that does not get better when you change positions  You have bloody mucus or show  You have questions or concerns about your condition or care  Early Labor signs and symptoms:   Lightening  occurs when your baby drops inside your pelvis  You may feel increased pressure in your pelvis  This may happen a few weeks to a few hours before your labor begins  Contractions  are cramps and tightening that occur in your uterus to help move the baby through your birth canal  Contractions occur regularly and more often each time  Each one lasts about 30 to 70 seconds, and gets stronger until you deliver your baby  Contractions do not go away with movement  The pain usually starts in your lower back and moves to your abdomen  Effacement  occurs when your cervix softens and thins, so it can easily open for the baby  You will not be able to feel effacement  Your healthcare provider will examine your cervix for effacement  Dilation  is widening of your cervix  Your healthcare provider will examine your cervix for dilation  Your cervix may start to dilate weeks before your baby is delivered  Your cervix will be fully opened and ready for delivery when it is dilated to 10 centimeters  Increased discharge  from your vagina may occur  It may be brown, pink, clear, or slightly bloody  This discharge may also be called bloody show  Bloody show is a mucus plug that forms and blocks your cervix during pregnancy  The discharge may mean that your cervix is opening up and getting ready for delivery  Rupture of membranes  is a sudden release of clear fluid from your vagina  Ruptured membranes means your water broke  Your healthcare provider may need to break your water if it does not happen on its own  © Copyright Margret Push 2022 Information is for End User's use only and may not be sold, redistributed or otherwise used for commercial purposes  The above information is an  only   It is not intended as medical advice for individual conditions or treatments  Talk to your doctor, nurse or pharmacist before following any medical regimen to see if it is safe and effective for you

## 2023-04-28 NOTE — PROGRESS NOTES
Routine Prenatal Visit  [facility] [address] [place] [address]    Assessment/Plan:  Germán Resendez is a 20y o  year old  at 27w7d who presents for routine prenatal visit  1  Encounter for supervision of normal first pregnancy in third trimester    2  35 weeks gestation of pregnancy  -     Strep B DNA probe, amplification    3  Anemia during pregnancy in third trimester    4  Thrombocytopenia affecting pregnancy, antepartum ([address] )    5  Gestational hypertension, third trimester  Assessment & Plan:  IOL scheduled 23 in PM          Subjective:     CC: Prenatal care    Carlton Whitaker is a 20 y o   female who presents for routine prenatal care at 35w5d  Pregnancy ROS: no leakage of fluid, pelvic pain, or vaginal bleeding   good fetal movement  Discussed IOL process for patient; patient is favorable on exam today  Labor precautions  The following portions of the patient's history were reviewed and updated as appropriate: allergies, current medications, past family history, past medical history, obstetric history, gynecologic history, past social history, past surgical history and problem list       Objective:  /70   Wt 89 4 kg (197 lb 1 6 oz)   LMP 2022   BMI 34 91 kg/m²   Pregravid Weight/BMI: 71 7 kg (158 lb) (BMI 28 00)  Current Weight: 89 4 kg (197 lb 1 6 oz)   Total Weight Gain: 17 7 kg (39 lb 1 6 oz)   Pre-Buster Vitals    Flowsheet Row Most Recent Value   Prenatal Assessment    Fetal Heart Rate 153   Movement Present   Prenatal Vitals    Blood Pressure 110/70   Weight - Scale 89 4 kg (197 lb 1 6 oz)   Urine Albumin/Glucose    Dilation/Effacement/Station    Cervical Dilation 2 5   Cervical Effacement 80   Fetal Station -1   Vaginal Drainage    Edema    LLE Edema Trace   RLE Edema Trace           General: Well appearing, no distress  Respiratory: Unlabored breathing  Cardiovascular: Regular rate    Abdomen: Soft, gravid, nontender  Fundal Height: Appropriate for gestational age  Extremities: Warm and well perfused  Non tender

## 2023-04-30 LAB — GP B STREP DNA SPEC QL NAA+PROBE: POSITIVE

## 2023-05-04 ENCOUNTER — ULTRASOUND (OUTPATIENT)
Age: 23
End: 2023-05-04

## 2023-05-04 ENCOUNTER — ANESTHESIA EVENT (INPATIENT)
Dept: LABOR AND DELIVERY | Facility: HOSPITAL | Age: 23
End: 2023-05-04

## 2023-05-04 ENCOUNTER — APPOINTMENT (OUTPATIENT)
Dept: LAB | Facility: HOSPITAL | Age: 23
End: 2023-05-04

## 2023-05-04 ENCOUNTER — ANESTHESIA (INPATIENT)
Dept: LABOR AND DELIVERY | Facility: HOSPITAL | Age: 23
End: 2023-05-04

## 2023-05-04 ENCOUNTER — DOCUMENTATION (OUTPATIENT)
Dept: PERINATAL CARE | Facility: CLINIC | Age: 23
End: 2023-05-04

## 2023-05-04 ENCOUNTER — HOSPITAL ENCOUNTER (INPATIENT)
Facility: HOSPITAL | Age: 23
LOS: 5 days | Discharge: HOME | End: 2023-05-09
Attending: OBSTETRICS & GYNECOLOGY | Admitting: OBSTETRICS & GYNECOLOGY

## 2023-05-04 VITALS
DIASTOLIC BLOOD PRESSURE: 92 MMHG | SYSTOLIC BLOOD PRESSURE: 152 MMHG | WEIGHT: 197 LBS | HEART RATE: 91 BPM | BODY MASS INDEX: 34.91 KG/M2 | HEIGHT: 63 IN

## 2023-05-04 DIAGNOSIS — Z3A.35 35 WEEKS GESTATION OF PREGNANCY: Primary | ICD-10-CM

## 2023-05-04 DIAGNOSIS — O99.119 THROMBOCYTOPENIA AFFECTING PREGNANCY, ANTEPARTUM (HCC): ICD-10-CM

## 2023-05-04 DIAGNOSIS — O13.3 GESTATIONAL HYPERTENSION, THIRD TRIMESTER: Primary | ICD-10-CM

## 2023-05-04 DIAGNOSIS — Z36.89 ENCOUNTER FOR ULTRASOUND TO CHECK FETAL GROWTH: ICD-10-CM

## 2023-05-04 DIAGNOSIS — O35.2XX0 HEREDITARY FAMILIAL DISEASE AFFECTING MANAGEMENT OF MOTHER AND POSSIBLY AFFECTING FETUS, ANTEPARTUM, SINGLE OR UNSPECIFIED FETUS: ICD-10-CM

## 2023-05-04 DIAGNOSIS — Z3A.35 35 WEEKS GESTATION OF PREGNANCY: ICD-10-CM

## 2023-05-04 DIAGNOSIS — I37.0 NONRHEUMATIC PULMONARY VALVE STENOSIS: ICD-10-CM

## 2023-05-04 DIAGNOSIS — D69.6 THROMBOCYTOPENIA AFFECTING PREGNANCY, ANTEPARTUM (HCC): ICD-10-CM

## 2023-05-04 DIAGNOSIS — Q25.6 CONGENITAL PULMONARY STENOSIS: ICD-10-CM

## 2023-05-04 DIAGNOSIS — O13.3 GESTATIONAL HYPERTENSION, THIRD TRIMESTER: ICD-10-CM

## 2023-05-04 DIAGNOSIS — O14.93 PRE-ECLAMPSIA IN THIRD TRIMESTER: ICD-10-CM

## 2023-05-04 PROBLEM — Z3A.36 36 WEEKS GESTATION OF PREGNANCY: Status: ACTIVE | Noted: 2022-11-18

## 2023-05-04 PROBLEM — Z98.891 S/P PRIMARY LOW TRANSVERSE C-SECTION: Status: ACTIVE | Noted: 2023-05-04

## 2023-05-04 PROBLEM — B95.1 POSITIVE GBS TEST: Status: ACTIVE | Noted: 2023-05-04

## 2023-05-04 LAB
ABO GROUP BLD: NORMAL
ALBUMIN SERPL BCP-MCNC: 3.4 G/DL (ref 3.5–5)
ALBUMIN SERPL BCP-MCNC: 3.6 G/DL (ref 3.5–5)
ALBUMIN SERPL BCP-MCNC: 3.6 G/DL (ref 3.5–5)
ALP SERPL-CCNC: 149 U/L (ref 34–104)
ALP SERPL-CCNC: 153 U/L (ref 34–104)
ALP SERPL-CCNC: 155 U/L (ref 34–104)
ALT SERPL W P-5'-P-CCNC: 43 U/L (ref 7–52)
ALT SERPL W P-5'-P-CCNC: 44 U/L (ref 7–52)
ALT SERPL W P-5'-P-CCNC: 46 U/L (ref 7–52)
ANION GAP SERPL CALCULATED.3IONS-SCNC: 8 MMOL/L (ref 4–13)
ANION GAP SERPL CALCULATED.3IONS-SCNC: 8 MMOL/L (ref 4–13)
ANION GAP SERPL CALCULATED.3IONS-SCNC: 9 MMOL/L (ref 4–13)
AST SERPL W P-5'-P-CCNC: 27 U/L (ref 13–39)
AST SERPL W P-5'-P-CCNC: 27 U/L (ref 13–39)
AST SERPL W P-5'-P-CCNC: 34 U/L (ref 13–39)
BASE EXCESS BLDCOA CALC-SCNC: -9.7 MMOL/L (ref 3–11)
BASE EXCESS BLDCOV CALC-SCNC: -9 MMOL/L (ref 1–9)
BASOPHILS # BLD AUTO: 0.03 THOUSANDS/ÂΜL (ref 0–0.1)
BASOPHILS # BLD AUTO: 0.06 THOUSANDS/ÂΜL (ref 0–0.1)
BASOPHILS NFR BLD AUTO: 0 % (ref 0–1)
BASOPHILS NFR BLD AUTO: 1 % (ref 0–1)
BILIRUB SERPL-MCNC: 0.28 MG/DL (ref 0.2–1)
BILIRUB SERPL-MCNC: 0.28 MG/DL (ref 0.2–1)
BILIRUB SERPL-MCNC: 0.31 MG/DL (ref 0.2–1)
BLD GP AB SCN SERPL QL: NEGATIVE
BUN SERPL-MCNC: 12 MG/DL (ref 5–25)
BUN SERPL-MCNC: 13 MG/DL (ref 5–25)
BUN SERPL-MCNC: 14 MG/DL (ref 5–25)
CALCIUM ALBUM COR SERPL-MCNC: 9.7 MG/DL (ref 8.3–10.1)
CALCIUM SERPL-MCNC: 8.9 MG/DL (ref 8.4–10.2)
CALCIUM SERPL-MCNC: 9.1 MG/DL (ref 8.4–10.2)
CALCIUM SERPL-MCNC: 9.2 MG/DL (ref 8.4–10.2)
CHLORIDE SERPL-SCNC: 104 MMOL/L (ref 96–108)
CO2 SERPL-SCNC: 19 MMOL/L (ref 21–32)
CO2 SERPL-SCNC: 20 MMOL/L (ref 21–32)
CO2 SERPL-SCNC: 24 MMOL/L (ref 21–32)
CREAT SERPL-MCNC: 0.68 MG/DL (ref 0.6–1.3)
CREAT SERPL-MCNC: 0.73 MG/DL (ref 0.6–1.3)
CREAT SERPL-MCNC: 0.75 MG/DL (ref 0.6–1.3)
CREAT UR-MCNC: 212.4 MG/DL
EOSINOPHIL # BLD AUTO: 0.02 THOUSAND/ÂΜL (ref 0–0.61)
EOSINOPHIL # BLD AUTO: 0.11 THOUSAND/ÂΜL (ref 0–0.61)
EOSINOPHIL NFR BLD AUTO: 0 % (ref 0–6)
EOSINOPHIL NFR BLD AUTO: 1 % (ref 0–6)
ERYTHROCYTE [DISTWIDTH] IN BLOOD BY AUTOMATED COUNT: 12.6 % (ref 11.6–15.1)
ERYTHROCYTE [DISTWIDTH] IN BLOOD BY AUTOMATED COUNT: 12.8 % (ref 11.6–15.1)
ERYTHROCYTE [DISTWIDTH] IN BLOOD BY AUTOMATED COUNT: 12.9 % (ref 11.6–15.1)
GFR SERPL CREATININE-BSD FRML MDRD: 112 ML/MIN/1.73SQ M
GFR SERPL CREATININE-BSD FRML MDRD: 116 ML/MIN/1.73SQ M
GFR SERPL CREATININE-BSD FRML MDRD: 123 ML/MIN/1.73SQ M
GLUCOSE P FAST SERPL-MCNC: 98 MG/DL (ref 65–99)
GLUCOSE SERPL-MCNC: 108 MG/DL (ref 65–140)
GLUCOSE SERPL-MCNC: 95 MG/DL (ref 65–140)
HCO3 BLDCOA-SCNC: 20.1 MMOL/L (ref 17.3–27.3)
HCO3 BLDCOV-SCNC: 19.6 MMOL/L (ref 12.2–28.6)
HCT VFR BLD AUTO: 36.5 % (ref 34.8–46.1)
HCT VFR BLD AUTO: 37 % (ref 34.8–46.1)
HCT VFR BLD AUTO: 37.3 % (ref 34.8–46.1)
HGB BLD-MCNC: 12.3 G/DL (ref 11.5–15.4)
HGB BLD-MCNC: 12.5 G/DL (ref 11.5–15.4)
HGB BLD-MCNC: 12.5 G/DL (ref 11.5–15.4)
IMM GRANULOCYTES # BLD AUTO: 0.05 THOUSAND/UL (ref 0–0.2)
IMM GRANULOCYTES # BLD AUTO: 0.12 THOUSAND/UL (ref 0–0.2)
IMM GRANULOCYTES NFR BLD AUTO: 0 % (ref 0–2)
IMM GRANULOCYTES NFR BLD AUTO: 1 % (ref 0–2)
LYMPHOCYTES # BLD AUTO: 1.22 THOUSANDS/ÂΜL (ref 0.6–4.47)
LYMPHOCYTES # BLD AUTO: 2.13 THOUSANDS/ÂΜL (ref 0.6–4.47)
LYMPHOCYTES NFR BLD AUTO: 17 % (ref 14–44)
LYMPHOCYTES NFR BLD AUTO: 8 % (ref 14–44)
MAGNESIUM SERPL-MCNC: 5 MG/DL (ref 1.9–2.7)
MCH RBC QN AUTO: 31.3 PG (ref 26.8–34.3)
MCH RBC QN AUTO: 31.4 PG (ref 26.8–34.3)
MCH RBC QN AUTO: 32.1 PG (ref 26.8–34.3)
MCHC RBC AUTO-ENTMCNC: 33.2 G/DL (ref 31.4–37.4)
MCHC RBC AUTO-ENTMCNC: 33.5 G/DL (ref 31.4–37.4)
MCHC RBC AUTO-ENTMCNC: 34.2 G/DL (ref 31.4–37.4)
MCV RBC AUTO: 93 FL (ref 82–98)
MCV RBC AUTO: 94 FL (ref 82–98)
MCV RBC AUTO: 94 FL (ref 82–98)
MONOCYTES # BLD AUTO: 0.26 THOUSAND/ÂΜL (ref 0.17–1.22)
MONOCYTES # BLD AUTO: 1 THOUSAND/ÂΜL (ref 0.17–1.22)
MONOCYTES NFR BLD AUTO: 2 % (ref 4–12)
MONOCYTES NFR BLD AUTO: 8 % (ref 4–12)
NEUTROPHILS # BLD AUTO: 13.61 THOUSANDS/ÂΜL (ref 1.85–7.62)
NEUTROPHILS # BLD AUTO: 9.11 THOUSANDS/ÂΜL (ref 1.85–7.62)
NEUTS SEG NFR BLD AUTO: 73 % (ref 43–75)
NEUTS SEG NFR BLD AUTO: 89 % (ref 43–75)
NRBC BLD AUTO-RTO: 0 /100 WBCS
NRBC BLD AUTO-RTO: 0 /100 WBCS
O2 CT VFR BLDCOA CALC: 0 ML/DL
OXYHGB MFR BLDCOA: 3.4 %
OXYHGB MFR BLDCOV: 15.3 %
PCO2 BLDCOA: 59.9 MM[HG] (ref 30–60)
PCO2 BLDCOV: 52 MM HG (ref 27–43)
PH BLDCOA: 7.14 [PH] (ref 7.23–7.43)
PH BLDCOV: 7.19 [PH] (ref 7.19–7.49)
PLATELET # BLD AUTO: 137 THOUSANDS/UL (ref 149–390)
PLATELET # BLD AUTO: 149 THOUSANDS/UL (ref 149–390)
PLATELET # BLD AUTO: 152 THOUSANDS/UL (ref 149–390)
PMV BLD AUTO: 13.8 FL (ref 8.9–12.7)
PMV BLD AUTO: 13.9 FL (ref 8.9–12.7)
PMV BLD AUTO: 14.6 FL (ref 8.9–12.7)
PO2 BLDCOA: <10 MM HG (ref 5–25)
PO2 BLDCOV: 13.2 MM HG (ref 15–45)
POTASSIUM SERPL-SCNC: 3.8 MMOL/L (ref 3.5–5.3)
POTASSIUM SERPL-SCNC: 3.9 MMOL/L (ref 3.5–5.3)
POTASSIUM SERPL-SCNC: 4.6 MMOL/L (ref 3.5–5.3)
PROT SERPL-MCNC: 6.6 G/DL (ref 6.4–8.4)
PROT SERPL-MCNC: 6.8 G/DL (ref 6.4–8.4)
PROT SERPL-MCNC: 7 G/DL (ref 6.4–8.4)
PROT UR-MCNC: 129 MG/DL
PROT/CREAT UR: 0.61 MG/G{CREAT} (ref 0–0.1)
RBC # BLD AUTO: 3.89 MILLION/UL (ref 3.81–5.12)
RBC # BLD AUTO: 3.92 MILLION/UL (ref 3.81–5.12)
RBC # BLD AUTO: 4 MILLION/UL (ref 3.81–5.12)
RH BLD: POSITIVE
SAO2 % BLDCOV: 3.5 ML/DL
SODIUM SERPL-SCNC: 131 MMOL/L (ref 135–147)
SODIUM SERPL-SCNC: 132 MMOL/L (ref 135–147)
SODIUM SERPL-SCNC: 137 MMOL/L (ref 135–147)
SPECIMEN EXPIRATION DATE: NORMAL
URATE SERPL-MCNC: 6.9 MG/DL (ref 2–7.5)
WBC # BLD AUTO: 12.31 THOUSAND/UL (ref 4.31–10.16)
WBC # BLD AUTO: 12.46 THOUSAND/UL (ref 4.31–10.16)
WBC # BLD AUTO: 15.26 THOUSAND/UL (ref 4.31–10.16)

## 2023-05-04 PROCEDURE — 4A1HXCZ MONITORING OF PRODUCTS OF CONCEPTION, CARDIAC RATE, EXTERNAL APPROACH: ICD-10-PCS | Performed by: OBSTETRICS & GYNECOLOGY

## 2023-05-04 RX ORDER — LIDOCAINE HYDROCHLORIDE AND EPINEPHRINE 20; 5 MG/ML; UG/ML
INJECTION, SOLUTION EPIDURAL; INFILTRATION; INTRACAUDAL; PERINEURAL AS NEEDED
Status: DISCONTINUED | OUTPATIENT
Start: 2023-05-04 | End: 2023-05-04

## 2023-05-04 RX ORDER — OXYCODONE HYDROCHLORIDE 5 MG/1
5 TABLET ORAL EVERY 4 HOURS PRN
Status: ACTIVE | OUTPATIENT
Start: 2023-05-04 | End: 2023-05-05

## 2023-05-04 RX ORDER — HYDROMORPHONE HCL/PF 1 MG/ML
0.5 SYRINGE (ML) INJECTION EVERY 2 HOUR PRN
Status: ACTIVE | OUTPATIENT
Start: 2023-05-04 | End: 2023-05-05

## 2023-05-04 RX ORDER — BETAMETHASONE SODIUM PHOSPHATE AND BETAMETHASONE ACETATE 3; 3 MG/ML; MG/ML
12 INJECTION, SUSPENSION INTRA-ARTICULAR; INTRALESIONAL; INTRAMUSCULAR; SOFT TISSUE EVERY 24 HOURS
Status: DISCONTINUED | OUTPATIENT
Start: 2023-05-04 | End: 2023-05-05

## 2023-05-04 RX ORDER — MAGNESIUM SULFATE HEPTAHYDRATE 40 MG/ML
2 INJECTION, SOLUTION INTRAVENOUS CONTINUOUS
Status: DISCONTINUED | OUTPATIENT
Start: 2023-05-04 | End: 2023-05-05

## 2023-05-04 RX ORDER — HYDRALAZINE HYDROCHLORIDE 20 MG/ML
5 INJECTION INTRAMUSCULAR; INTRAVENOUS ONCE
Status: COMPLETED | OUTPATIENT
Start: 2023-05-04 | End: 2023-05-04

## 2023-05-04 RX ORDER — LIDOCAINE HYDROCHLORIDE AND EPINEPHRINE 15; 5 MG/ML; UG/ML
INJECTION, SOLUTION EPIDURAL AS NEEDED
Status: DISCONTINUED | OUTPATIENT
Start: 2023-05-04 | End: 2023-05-04

## 2023-05-04 RX ORDER — MORPHINE SULFATE 0.5 MG/ML
INJECTION, SOLUTION EPIDURAL; INTRATHECAL; INTRAVENOUS
Status: COMPLETED
Start: 2023-05-04 | End: 2023-05-04

## 2023-05-04 RX ORDER — LIDOCAINE HYDROCHLORIDE AND EPINEPHRINE 20; 5 MG/ML; UG/ML
INJECTION, SOLUTION EPIDURAL; INFILTRATION; INTRACAUDAL; PERINEURAL
Status: COMPLETED
Start: 2023-05-04 | End: 2023-05-04

## 2023-05-04 RX ORDER — LABETALOL HYDROCHLORIDE 5 MG/ML
10 INJECTION, SOLUTION INTRAVENOUS ONCE
Status: DISCONTINUED | OUTPATIENT
Start: 2023-05-04 | End: 2023-05-05

## 2023-05-04 RX ORDER — NIFEDIPINE 30 MG/1
30 TABLET, EXTENDED RELEASE ORAL DAILY
Status: DISCONTINUED | OUTPATIENT
Start: 2023-05-04 | End: 2023-05-07

## 2023-05-04 RX ORDER — KETAMINE HCL IN NACL, ISO-OSM 100MG/10ML
SYRINGE (ML) INJECTION AS NEEDED
Status: DISCONTINUED | OUTPATIENT
Start: 2023-05-04 | End: 2023-05-04

## 2023-05-04 RX ORDER — NALBUPHINE HCL 10 MG/ML
3 AMPUL (ML) INJECTION
Status: ACTIVE | OUTPATIENT
Start: 2023-05-04 | End: 2023-05-05

## 2023-05-04 RX ORDER — ONDANSETRON 2 MG/ML
4 INJECTION INTRAMUSCULAR; INTRAVENOUS ONCE AS NEEDED
Status: DISCONTINUED | OUTPATIENT
Start: 2023-05-04 | End: 2023-05-05

## 2023-05-04 RX ORDER — DIPHENHYDRAMINE HYDROCHLORIDE 50 MG/ML
25 INJECTION INTRAMUSCULAR; INTRAVENOUS EVERY 6 HOURS PRN
Status: ACTIVE | OUTPATIENT
Start: 2023-05-04 | End: 2023-05-05

## 2023-05-04 RX ORDER — PHENYLEPHRINE HCL IN 0.9% NACL 1 MG/10 ML
SYRINGE (ML) INTRAVENOUS
Status: DISPENSED
Start: 2023-05-04 | End: 2023-05-05

## 2023-05-04 RX ORDER — ONDANSETRON 2 MG/ML
INJECTION INTRAMUSCULAR; INTRAVENOUS AS NEEDED
Status: DISCONTINUED | OUTPATIENT
Start: 2023-05-04 | End: 2023-05-04

## 2023-05-04 RX ORDER — HYDRALAZINE HYDROCHLORIDE 20 MG/ML
10 INJECTION INTRAMUSCULAR; INTRAVENOUS ONCE
Status: COMPLETED | OUTPATIENT
Start: 2023-05-04 | End: 2023-05-04

## 2023-05-04 RX ORDER — CEFAZOLIN SODIUM 2 G/50ML
2000 SOLUTION INTRAVENOUS ONCE
Status: COMPLETED | OUTPATIENT
Start: 2023-05-04 | End: 2023-05-04

## 2023-05-04 RX ORDER — FENTANYL CITRATE/PF 50 MCG/ML
50 SYRINGE (ML) INJECTION
Status: DISCONTINUED | OUTPATIENT
Start: 2023-05-04 | End: 2023-05-05

## 2023-05-04 RX ORDER — MAGNESIUM SULFATE HEPTAHYDRATE 40 MG/ML
2 INJECTION, SOLUTION INTRAVENOUS ONCE
Status: COMPLETED | OUTPATIENT
Start: 2023-05-04 | End: 2023-05-05

## 2023-05-04 RX ORDER — OXYTOCIN 10 [USP'U]/ML
INJECTION, SOLUTION INTRAMUSCULAR; INTRAVENOUS
Status: DISPENSED
Start: 2023-05-04 | End: 2023-05-05

## 2023-05-04 RX ORDER — LABETALOL HYDROCHLORIDE 5 MG/ML
40 INJECTION, SOLUTION INTRAVENOUS ONCE
Status: COMPLETED | OUTPATIENT
Start: 2023-05-04 | End: 2023-05-04

## 2023-05-04 RX ORDER — LABETALOL HYDROCHLORIDE 5 MG/ML
80 INJECTION, SOLUTION INTRAVENOUS ONCE
Status: COMPLETED | OUTPATIENT
Start: 2023-05-04 | End: 2023-05-04

## 2023-05-04 RX ORDER — NALOXONE HYDROCHLORIDE 0.4 MG/ML
0.1 INJECTION, SOLUTION INTRAMUSCULAR; INTRAVENOUS; SUBCUTANEOUS
Status: ACTIVE | OUTPATIENT
Start: 2023-05-04 | End: 2023-05-05

## 2023-05-04 RX ORDER — MORPHINE SULFATE 0.5 MG/ML
INJECTION, SOLUTION EPIDURAL; INTRATHECAL; INTRAVENOUS AS NEEDED
Status: DISCONTINUED | OUTPATIENT
Start: 2023-05-04 | End: 2023-05-04

## 2023-05-04 RX ORDER — OXYTOCIN 10 [USP'U]/ML
INJECTION, SOLUTION INTRAMUSCULAR; INTRAVENOUS AS NEEDED
Status: DISCONTINUED | OUTPATIENT
Start: 2023-05-04 | End: 2023-05-04

## 2023-05-04 RX ORDER — KETAMINE HCL IN NACL, ISO-OSM 100MG/10ML
SYRINGE (ML) INJECTION
Status: COMPLETED
Start: 2023-05-04 | End: 2023-05-04

## 2023-05-04 RX ORDER — ONDANSETRON 2 MG/ML
4 INJECTION INTRAMUSCULAR; INTRAVENOUS EVERY 6 HOURS PRN
Status: ACTIVE | OUTPATIENT
Start: 2023-05-04 | End: 2023-05-05

## 2023-05-04 RX ORDER — LABETALOL HYDROCHLORIDE 5 MG/ML
20 INJECTION, SOLUTION INTRAVENOUS ONCE
Status: COMPLETED | OUTPATIENT
Start: 2023-05-04 | End: 2023-05-04

## 2023-05-04 RX ORDER — SODIUM CHLORIDE, SODIUM LACTATE, POTASSIUM CHLORIDE, CALCIUM CHLORIDE 600; 310; 30; 20 MG/100ML; MG/100ML; MG/100ML; MG/100ML
INJECTION, SOLUTION INTRAVENOUS CONTINUOUS PRN
Status: DISCONTINUED | OUTPATIENT
Start: 2023-05-04 | End: 2023-05-04

## 2023-05-04 RX ORDER — PHENYLEPHRINE HCL IN 0.9% NACL 1 MG/10 ML
SYRINGE (ML) INTRAVENOUS AS NEEDED
Status: DISCONTINUED | OUTPATIENT
Start: 2023-05-04 | End: 2023-05-04

## 2023-05-04 RX ORDER — MIDAZOLAM HYDROCHLORIDE 2 MG/2ML
INJECTION, SOLUTION INTRAMUSCULAR; INTRAVENOUS
Status: COMPLETED
Start: 2023-05-04 | End: 2023-05-04

## 2023-05-04 RX ORDER — OXYTOCIN/RINGER'S LACTATE 30/500 ML
PLASTIC BAG, INJECTION (ML) INTRAVENOUS CONTINUOUS PRN
Status: DISCONTINUED | OUTPATIENT
Start: 2023-05-04 | End: 2023-05-04

## 2023-05-04 RX ORDER — MIDAZOLAM HYDROCHLORIDE 2 MG/2ML
INJECTION, SOLUTION INTRAMUSCULAR; INTRAVENOUS AS NEEDED
Status: DISCONTINUED | OUTPATIENT
Start: 2023-05-04 | End: 2023-05-04

## 2023-05-04 RX ORDER — MAGNESIUM SULFATE HEPTAHYDRATE 40 MG/ML
4 INJECTION, SOLUTION INTRAVENOUS ONCE
Status: COMPLETED | OUTPATIENT
Start: 2023-05-04 | End: 2023-05-04

## 2023-05-04 RX ORDER — ACETAMINOPHEN 325 MG/1
975 TABLET ORAL EVERY 6 HOURS PRN
Status: DISPENSED | OUTPATIENT
Start: 2023-05-04 | End: 2023-05-05

## 2023-05-04 RX ORDER — OXYTOCIN/RINGER'S LACTATE 30/500 ML
1-30 PLASTIC BAG, INJECTION (ML) INTRAVENOUS
Status: DISCONTINUED | OUTPATIENT
Start: 2023-05-04 | End: 2023-05-04

## 2023-05-04 RX ADMIN — LIDOCAINE HYDROCHLORIDE AND EPINEPHRINE 3 ML: 15; 5 INJECTION, SOLUTION EPIDURAL at 21:54

## 2023-05-04 RX ADMIN — LIDOCAINE HYDROCHLORIDE AND EPINEPHRINE 5 ML: 20; 5 INJECTION, SOLUTION EPIDURAL; INFILTRATION; INTRACAUDAL; PERINEURAL at 22:21

## 2023-05-04 RX ADMIN — OXYTOCIN 10 UNITS: 10 INJECTION INTRAVENOUS at 22:41

## 2023-05-04 RX ADMIN — HYDRALAZINE HYDROCHLORIDE 5 MG: 20 INJECTION, SOLUTION INTRAMUSCULAR; INTRAVENOUS at 19:33

## 2023-05-04 RX ADMIN — LABETALOL HYDROCHLORIDE 40 MG: 5 INJECTION, SOLUTION INTRAVENOUS at 18:50

## 2023-05-04 RX ADMIN — Medication 250 MILLI-UNITS/MIN: at 23:03

## 2023-05-04 RX ADMIN — OXYTOCIN 2 MILLI-UNITS/MIN: 10 INJECTION INTRAVENOUS at 20:25

## 2023-05-04 RX ADMIN — LABETALOL HYDROCHLORIDE 40 MG: 5 INJECTION, SOLUTION INTRAVENOUS at 18:27

## 2023-05-04 RX ADMIN — LABETALOL HYDROCHLORIDE 80 MG: 5 INJECTION, SOLUTION INTRAVENOUS at 19:13

## 2023-05-04 RX ADMIN — LIDOCAINE HYDROCHLORIDE AND EPINEPHRINE 5 ML: 20; 5 INJECTION, SOLUTION EPIDURAL; INFILTRATION; INTRACAUDAL; PERINEURAL at 22:26

## 2023-05-04 RX ADMIN — NIFEDIPINE 30 MG: 30 TABLET, FILM COATED, EXTENDED RELEASE ORAL at 21:07

## 2023-05-04 RX ADMIN — SODIUM CHLORIDE, SODIUM LACTATE, POTASSIUM CHLORIDE, AND CALCIUM CHLORIDE: .6; .31; .03; .02 INJECTION, SOLUTION INTRAVENOUS at 23:10

## 2023-05-04 RX ADMIN — Medication 100 MCG: at 22:37

## 2023-05-04 RX ADMIN — MAGNESIUM SULFATE HEPTAHYDRATE 4 G: 40 INJECTION, SOLUTION INTRAVENOUS at 18:14

## 2023-05-04 RX ADMIN — LIDOCAINE HYDROCHLORIDE AND EPINEPHRINE 5 ML: 20; 5 INJECTION, SOLUTION EPIDURAL; INFILTRATION; INTRACAUDAL; PERINEURAL at 22:29

## 2023-05-04 RX ADMIN — CEFAZOLIN SODIUM 2000 MG: 2 SOLUTION INTRAVENOUS at 22:20

## 2023-05-04 RX ADMIN — MORPHINE SULFATE 3 MG: 0.5 INJECTION, SOLUTION EPIDURAL; INTRATHECAL; INTRAVENOUS at 22:53

## 2023-05-04 RX ADMIN — BETAMETHASONE SODIUM PHOSPHATE AND BETAMETHASONE ACETATE 12 MG: 3; 3 INJECTION, SUSPENSION INTRA-ARTICULAR; INTRALESIONAL; INTRAMUSCULAR at 17:54

## 2023-05-04 RX ADMIN — ONDANSETRON 4 MG: 2 INJECTION INTRAMUSCULAR; INTRAVENOUS at 22:22

## 2023-05-04 RX ADMIN — LIDOCAINE HYDROCHLORIDE AND EPINEPHRINE 5 ML: 20; 5 INJECTION, SOLUTION EPIDURAL; INFILTRATION; INTRACAUDAL; PERINEURAL at 22:32

## 2023-05-04 RX ADMIN — Medication 250 MILLI-UNITS/MIN: at 22:34

## 2023-05-04 RX ADMIN — Medication 30 MG: at 22:37

## 2023-05-04 RX ADMIN — SODIUM CHLORIDE, SODIUM LACTATE, POTASSIUM CHLORIDE, AND CALCIUM CHLORIDE: .6; .31; .03; .02 INJECTION, SOLUTION INTRAVENOUS at 22:18

## 2023-05-04 RX ADMIN — MIDAZOLAM 2 MG: 1 INJECTION INTRAMUSCULAR; INTRAVENOUS at 22:36

## 2023-05-04 RX ADMIN — HYDRALAZINE HYDROCHLORIDE 10 MG: 20 INJECTION, SOLUTION INTRAMUSCULAR; INTRAVENOUS at 20:55

## 2023-05-04 RX ADMIN — MAGNESIUM SULFATE HEPTAHYDRATE 2 G: 40 INJECTION, SOLUTION INTRAVENOUS at 18:31

## 2023-05-04 RX ADMIN — HYDRALAZINE HYDROCHLORIDE 5 MG: 20 INJECTION, SOLUTION INTRAMUSCULAR; INTRAVENOUS at 20:04

## 2023-05-04 RX ADMIN — LABETALOL HYDROCHLORIDE 20 MG: 5 INJECTION, SOLUTION INTRAVENOUS at 18:06

## 2023-05-04 RX ADMIN — MAGNESIUM SULFATE HEPTAHYDRATE 2 G/HR: 40 INJECTION, SOLUTION INTRAVENOUS at 18:42

## 2023-05-04 RX ADMIN — SODIUM CHLORIDE 5 MILLION UNITS: 0.9 INJECTION, SOLUTION INTRAVENOUS at 20:19

## 2023-05-04 NOTE — ASSESSMENT & PLAN NOTE
Last maternal echo  with mild stenosis and mild to moderate regurgitation  Fetal cardiology - echo is normal - by peds Dr Rosette Emmanuel  Cardiology contacted via phone regarding recommendations for  versus trial of labor and recommendations for antihypertensives  Per on call cardiologist -no contraindication against attempted vaginal delivery or  at this time both labetalol and hydralazine are okay to use for antihypertensive  Repeat echo 23 largely stable per cardiology with no acute findings

## 2023-05-04 NOTE — PROGRESS NOTES
Reviewed signs and symptoms of high blood pressure, right upper epigastric pain, swelling, visual changes, and headaches  Pt verbalized understanding and verified with teach back method  Pt denies Headache, Epigastric pain, Visual changes  GHTN  Original BP:  150/100 Right arm, standard cuff  Repeat BP:  152/92, right arm, large adult cuff  Dr Shayy Martin discussed with patient  Patient is called central scheduling at [phone] to schedule her STAT echo    05/05/2023 8 am [facility]

## 2023-05-04 NOTE — CONSULTS
Consultation - Maternal Fetal Medicine   [facility] 20 y o  female MRN: [id number]  Unit/Bed#: L&D 326-01 Encounter: [id number]  Admit date: 2023  Today's date: 23    Assessment/Plan   Ms Vane Beaver is a 20y o  year-old  at [address] Day: 1, admitted for preeclampsia with severe features  By issue:    Pre-eclampsia in third trimester  Assessment & Plan  Now meets criteria for preeclampsia with severe features after sustained SRBP on arrival to L&D   Recommend magnesium infusion/acute treatment with antihypertensives   Given gestational age 37w2d - recommend delivery at this time     Congenital pulmonary stenosis  Assessment & Plan  Last maternal echo 2019 with mild stenosis and mild to moderate regurgitation   Fetal cardiology - echo is normal - by peds Dr Edwinna Goodell   Recommend consultation with cardiology regarding possible need for inpatient echo and if there are any recommendations on mode of delivery     * 36 weeks gestation of pregnancy  Assessment & Plan  Given new diagnosis of preeclampsia with severe features recommendation is for delivery at this time   Recommend discussion with cardiology regarding pulmonary stenosis and if there are any contraindications to trial of labor   Betamethasone 12 mg Q24H x 2 doses for fetal lung maturity             Chief Complaint   Patient presents with   • Hypertension       Physician Requesting Consult: Yvonne Alatorre MD  Reason for Consult / Principal Problem: Preeclampsia with severe features   Subspeciality: Perinatology    Dear Dr Ankit Sanchez,    Thank you for referring patient radha Femi for Maternal-Fetal Medicine consultation regarding radha Femi  HPI: As you know, Ms Vane Beaver is a 20y o  year-old  with an TED of Estimated Date of Delivery: 23 at 36w4d, Hospital Day: 1, admitted for preeclampsia with severe features in the setting of known maternal pulmonary steonsis    Given her elevated BP on arrival to labor and delivery, the recommendation is for delivery at this time  In th setting of her known congenital pulmonary stenosis, cardiology consult is recommended  Other obstetric review of symptoms:  Contractions: None  Leakage of fluid: None  Bleeding: None  Fetal movement: present  Review of Systems   Constitutional: Negative  HENT: Negative  Eyes: Negative  Respiratory: Negative  Cardiovascular: Negative  Gastrointestinal: Negative  Endocrine: Negative  Genitourinary: Negative  Musculoskeletal: Negative  Skin: Negative  Allergic/Immunologic: Negative  Neurological: Negative  Hematological: Negative  Psychiatric/Behavioral: Negative  Other history is as follows:    Historical Information   OB History    Para Term  AB Living   1 0 0 0 0 0   SAB IAB Ectopic Multiple Live Births   0 0 0 0 0      # Outcome Date GA Lbr Oz/2nd Weight Sex Delivery Anes PTL Lv   1 Current              Gynecologic history: Patient's last menstrual period was 2022  Past Medical History:   Diagnosis Date   • Heart murmur    • Right ovarian cyst 2020   • Varicella      Past Surgical History:   Procedure Laterality Date   • TONSILLECTOMY       Social History   Social History     Substance and Sexual Activity   Alcohol Use Not Currently     Social History     Substance and Sexual Activity   Drug Use Never     Social History     Tobacco Use   Smoking Status Never   Smokeless Tobacco Never     Meds/Allergies   Prior to Admission Medications   Prescriptions Last Dose Informant Patient Reported? Taking?    Prenatal Vit-Fe Fumarate-FA (PRENATAL VITAMINS PO)   Yes No   Sig: Take by mouth   ferrous sulfate 324 (65 Fe) mg   No No   Sig: Take 1 tablet (324 mg total) by mouth every other day      Facility-Administered Medications: None     Medication Administration - last 24 hours from 2023 183 to 2023 183       Date/Time Order Dose Route Action Action by     2023 1754 EDT betamethasone acetate-betamethasone sodium phosphate (CELESTONE) injection 12 mg 12 mg Intramuscular Given Oracio Mckoy RN     2023 1814 EDT magnesium sulfate 4 g/100 mL IVPB (premix) 4 g 4 g Intravenous [address] Oracio Mckoy RN     2023 1806 EDT labetalol (NORMODYNE) injection 20 mg 20 mg Intravenous Given Oracio Mckoy RN     2023 1827 EDT labetalol (NORMODYNE) injection 40 mg 40 mg Intravenous Given Oracio Mckoy RN        Current Facility-Administered Medications   Medication Dose Route Frequency   • betamethasone acetate-betamethasone sodium phosphate (CELESTONE) injection 12 mg  12 mg Intramuscular Q24H   • magnesium sulfate 4 g/100 mL IVPB (premix) 4 g  4 g Intravenous Once    Followed by   • magnesium sulfate 2 g/50 mL IVPB (premix) 2 g  2 g Intravenous Once   • magnesium sulfate 20 g/500 mL infusion (premix)  2 g/hr Intravenous Continuous     No Known Allergies    Objective    No data found  Vitals: Last menstrual period 2022, not currently breastfeeding  There is no height or weight on file to calculate BMI  Physical Exam  Constitutional:       General: She is not in acute distress  Appearance: Normal appearance  HENT:      Head: Normocephalic and atraumatic  Cardiovascular:      Rate and Rhythm: Normal rate  Heart sounds: Normal heart sounds  Pulmonary:      Effort: Pulmonary effort is normal       Breath sounds: Normal breath sounds  Abdominal:      Palpations: Abdomen is soft  Tenderness: There is no abdominal tenderness  Musculoskeletal:      Right lower le+ Edema present  Left lower le+ Edema present  Skin:     General: Skin is warm and dry  Neurological:      General: No focal deficit present  Mental Status: She is alert  Deep Tendon Reflexes:      Reflex Scores:       Brachioradialis reflexes are 3+ on the right side and 3+ on the left side         Patellar reflexes are 3+ on the right side and 3+ on the left side    Psychiatric:         Mood and Affect: Mood normal          Results from last 7 days   Lab Units 05/04/23  1745 05/04/23  1029   WBC Thousand/uL 12 46* 12 31*   NEUTROS ABS Thousands/µL 9 11*  --    HEMOGLOBIN g/dL 12 3 12 5   MCV fL 94 93   PLATELETS Thousands/uL 152 137*     Results from last 7 days   Lab Units 05/04/23  1745   NEUTROS PCT % 73   MONOS PCT % 8   EOS PCT % 1     Results from last 7 days   Lab Units 05/04/23  1029   POTASSIUM mmol/L 3 8   CHLORIDE mmol/L 104   CO2 mmol/L 24   BUN mg/dL 12   CREATININE mg/dL 0 75   EGFR ml/min/1 73sq m 112     Results from last 7 days   Lab Units 05/04/23  1029   AST U/L 27   ALT U/L 43   ALK PHOS U/L 149*     Results from last 7 days   Lab Units 05/04/23  1745 05/04/23  1029   PLATELETS Thousands/uL 152 137*         Results from last 7 days   Lab Units 05/04/23  1029   PROT/CREAT RATIO UR  0 61*                         Chris Montanez MD  5/4/2023  6:31 PM

## 2023-05-04 NOTE — H&P
[address] 20 y o  female MRN: [id number]  Unit/Bed#: L&D 326-01 Encounter: [id number]    Assessment: 20 y o   at 36w4d admitted for induction of labor in the setting of newly diagnosed preeclampsia with severe features    SVE: 3/80/-1  GBS status: Positive      Plan:   Positive GBS test  Assessment & Plan  PCN for GBS ppx    Thrombocytopenia affecting pregnancy, antepartum (HCC)  Assessment & Plan  Platelets on AM labs - 137   F/u repeat platelets on admission    Pre-eclampsia in third trimester  Assessment & Plan  Now meets criteria for preeclampsia with severe features after sustained SRBP on arrival to L&D   Magnesium 6 mg bolus followed 2 mg/hr   Labetalol 20 mg IV for antihypertensive treatment   Repeat BP in 15 minutes    Anemia during pregnancy in third trimester  Assessment & Plan  F/u admission Hgb     Congenital pulmonary stenosis  Assessment & Plan  Last maternal echo 2019 with mild stenosis and mild to moderate regurgitation   Fetal cardiology - echo is normal - by maninder Cabral   Cardiology contacted via phone regarding recommendations for  versus trial of labor and recommendations for antihypertensives   Per on call cardiologist -no contraindication against attempted vaginal delivery or  at this time both labetalol and hydralazine are okay to use for antihypertensive     * 36 weeks gestation of pregnancy  Assessment & Plan  Plan for induction of labor in the setting of preeclampsia with severe features  SVE on arrival 3/80/-1 begin induction of labor with Pitocin titration  Induction consent signed  Admission labs-CBC/type and screen/syphilis screen  NPO at this time in case of need to conversion for  section  LR 50 mL/h in the setting of preeclampsia with severe features  Analgesia at maternal request-consulted upon arrival secondary to hypertensive disorder and history of pulmonary stenosis  Rh+ postpartum RhoGAM panel not indicated  Betamethasone 12 mg Q24H x 2 doses for fetal lung maturity            Discussed case and plan w/ Dr Earlene Berkowitz      Chief Complaint: Sent over from office for elevated blood pressure    HPI: Federico Logan is a 20 y o   with an TED of 2023, by Last Menstrual Period at 36w4d who is being admitted duction of labor in the setting of preeclampsia with severe features  Kely Power was originally sent over from maternal-fetal medicine office after experiencing elevated, nonsevere blood pressures while at her appointment today  She was sent for outpatient labs which revealed thrombocytopenia and a protein to creatinine ratio which was elevated at 0 61  On arrival to labor and delivery she was noted to have a sustained severe range blood pressure and was treated acutely  Diagnosis of preeclampsia with severe features was made and the decision was made to proceed with induction of labor  Jessa's history of pulmonary stenosis was discussed upon arrival   She reports that she was diagnosed shortly after birth and has never had to have any surgery, procedures or medications  She endorses occasional dyspnea on exertion but reports this has been consistent throughout her whole life with no increase in symptoms during pregnancy  At this time she denies any headache, chest pain, shortness of breath  She denies any vision changes or other neurological symptoms  She denies any contractions, vaginal bleeding or leakage of fluid  She endorses good fetal movement      Patient Active Problem List   Diagnosis   • Heart murmur   • Physical exam   • Pulmonic stenosis   • Hair loss   • Dermoid cyst of right ovary   • Left wrist tendonitis   • Vertigo   • 36 weeks gestation of pregnancy   • Prenatal care in second trimester   • Congenital pulmonary stenosis   • Hereditary disease in family possibly affecting fetus, affecting management of mother, antepartum condition or complication   • Anemia during pregnancy in third trimester   • Pre-eclampsia in third trimester   • Thrombocytopenia affecting pregnancy, antepartum ([address] )   • Positive GBS test       Baby complications/comments: A viable ceja intrauterine pregnancy is seen  Estimated fetal weight  is technically normal (>10%ile) but borderline for this gestational age  Umbilical artery Dopplers were inadvertently obtained and noted to be  elevated with a pulsatility index >95%ile for this gestational age  There  is preserved end diastolic velocity  Amniotic fluid is within normal  limits for gestational age  No fetal anomalies are visualized on limited  survey  Placenta is within normal limits  Review of Systems   Constitutional: Negative  HENT: Negative  Eyes: Negative  Respiratory: Negative  Cardiovascular: Negative  Gastrointestinal: Negative  Endocrine: Negative  Genitourinary: Negative  Musculoskeletal: Negative  Skin: Negative  Allergic/Immunologic: Negative  Neurological: Negative  Hematological: Negative  Psychiatric/Behavioral: Negative  OB Hx:  OB History    Para Term  AB Living   1 0 0 0 0 0   SAB IAB Ectopic Multiple Live Births   0 0 0 0 0      # Outcome Date GA Lbr Oz/2nd Weight Sex Delivery Anes PTL Lv   1 Current                Past Medical Hx:  Past Medical History:   Diagnosis Date   • Heart murmur    • Right ovarian cyst 2020   • Varicella        Past Surgical hx:  Past Surgical History:   Procedure Laterality Date   • TONSILLECTOMY         No Known Allergies    Medications Prior to Admission   Medication   • ferrous sulfate 324 (65 Fe) mg   • Prenatal Vit-Fe Fumarate-FA (PRENATAL VITAMINS PO)       Objective:  Temp:  [98 5 °F (36 9 °C)] 98 5 °F (36 9 °C)  HR:  [65-91] 67  Resp:  [20] 20  BP: (146-180)/() 160/94  Body mass index is 34 9 kg/m²  Physical Exam:  Physical Exam  Constitutional:       General: She is not in acute distress       Appearance: Normal appearance  HENT:      Head: Normocephalic and atraumatic  Cardiovascular:      Rate and Rhythm: Normal rate  Heart sounds: Normal heart sounds  Pulmonary:      Effort: Pulmonary effort is normal       Breath sounds: Normal breath sounds  Abdominal:      Palpations: Abdomen is soft  Tenderness: There is no abdominal tenderness  Comments: Gravid   Musculoskeletal:      Right lower le+ Edema present  Left lower le+ Edema present  Neurological:      General: No focal deficit present  Mental Status: She is alert  Deep Tendon Reflexes:      Reflex Scores:       Brachioradialis reflexes are 3+ on the right side and 3+ on the left side  Patellar reflexes are 3+ on the right side and 3+ on the left side  Skin:     General: Skin is warm and dry     Psychiatric:         Mood and Affect: Mood normal             FHT:  Baseline Rate: 140 bpm  Variability: Moderate 6-25 bpm  Accelerations: 15 x 15 or greater, At variable times  Decelerations: None  FHR Category: Category I    TOCO:   Contraction Frequency (minutes): occasional  Contraction Duration (seconds): 40-60  Contraction Quality: Mild    Lab Results   Component Value Date    WBC 12 46 (H) 2023    HGB 12 3 2023    HCT 37 0 2023     2023     Lab Results   Component Value Date    K 3 9 2023     2023    CO2 20 (L) 2023    BUN 13 2023    CREATININE 0 68 2023    AST 27 2023    ALT 44 2023     Prenatal Labs: Reviewed      Blood type: O positive   Antibody: negative  GBS: positive  HIV: non-reactive  Rubella: Immune  RPR: non-reactive  HBsAg: non-reactive  Chlamydia: negative  Gonorrhea: negative  Diabetes 1 hour screen: 120  3 hour glucose: not indicated  Platelets: 742  Hgb: 9 8    >2 Midnights  INPATIENT     Signature/Title: Jennifer Fritz MD  Date: 2023  Time: 9:17 PM

## 2023-05-04 NOTE — PROGRESS NOTES
"Gallito Walsh: Ms Elvia Mccarthy was seen today for fetal growth assessment ultrasound  See ultrasound report under \"OB Procedures\" tab  The time spent on this established patient on the encounter date included 10 minutes previsit service time reviewing records and precharting, 30 minutes face-to-face service time counseling regarding results and coordinating care including discussion with OB provider Dr Domingo Coello, and  10 minutes charting, totalling 50 minutes  Please don't hesitate to contact our office with any concerns or questions   -Yudy Khan MD     Addendum 23 I called and spoke with [facility] regarding her lab results from today  She was sent for preeclampsia labs due to gestational hypertension with high mild range BP in our [facility] office today with a BP of 150/100 > 152/90  She is asymptomatic  Her labs show new elevated P:Cr 0 61, normal LFTs and creatinine, and mild thrombocytopenia with platelets of 253  She now meets criteria for preeclampsia without severe features  I am concerned for evolution of preeclampsia and recommended inpatient BP monitoring and serial labs as well as late  betamethasone  We briefly discussed reduction in transient breathing issues and decreased oxygen support in late  infants when given betamethasone versus the risk of hypoglycemia and unknown long term impact  She is aware of our recommendations and plans to proceed to [facility] L&D  Her team there has also been made aware  I would also recommend obtaining a maternal echo while she is admitted to be performed prior to initiating a delivery due to mild pulmonic stenosis (which has not been reevaluated with echo since 2019) to ensure safe delivery planning  She had a fetal echo for this history which was normal no  follow up needed for the baby       If discharged home from this inpatient observation, delivery should not occur past 37w0d which is on [address] " [address]  Gurinder MILLER  Attending Physician, [place]

## 2023-05-04 NOTE — PROGRESS NOTES
Called [facility] to request auth for an Echo ([id number]) per Francisco Benjamin is not require    Call reference # [id number]

## 2023-05-04 NOTE — ANESTHESIA PREPROCEDURE EVALUATION
Procedure:  LABOR ANALGESIA    Relevant Problems   CARDIO   (+) Congenital pulmonary stenosis   (+) Heart murmur   (+) Pre-eclampsia in third trimester   (+) Pulmonic stenosis      GYN   (+) 36 weeks gestation of pregnancy      HEMATOLOGY   (+) Anemia during pregnancy in third trimester   (+) Thrombocytopenia affecting pregnancy, antepartum (HCC)        Physical Exam    Airway    Mallampati score: II  TM Distance: >3 FB  Neck ROM: full     Dental   No notable dental hx     Cardiovascular  Rhythm: regular, Rate: normal, Cardiovascular exam normal    Pulmonary  Pulmonary exam normal Breath sounds clear to auscultation,     Other Findings        Anesthesia Plan  ASA Score- 3     Anesthesia Type- epidural with ASA Monitors  Additional Monitors:   Airway Plan:     Comment: LEFT VENTRICLE:  Normal left ventricular systolic function, EF 70%  Normal left ventricular cavity size  Normal left ventricular wall thickness  Normal left ventricular wall motion without regional wall motion abnormalities  Normal left ventricular  diastolic function and normal left atrial pressures      TRICUSPID VALVE:  There was trace regurgitation      PULMONIC VALVE:  Mild pulmonic stenosis  Pulmonary artery velocity 2 4 m/sec with a peak gradient of 22 mmHg  Mild-to-moderate pulmonic regurgitation      HISTORY: PRIOR HISTORY: Pulmonic stenosis     PROCEDURE: The study was performed in the [facility] OP  This was a routine study  The transthoracic approach was used  The study included complete 2D imaging, M-mode, complete spectral Doppler, and color Doppler  The heart rate was 70  bpm, at the start of the study  Images were obtained from the parasternal, apical, subcostal, and suprasternal notch acoustic windows  Image quality was adequate      LEFT VENTRICLE: Normal left ventricular systolic function, EF 91%  Normal left ventricular cavity size  Normal left ventricular wall thickness   Normal left ventricular wall motion without regional wall motion abnormalities  Normal left  ventricular diastolic function and normal left atrial pressures      RIGHT VENTRICLE: The size was normal  Systolic function was normal  Wall thickness was normal      LEFT ATRIUM: Size was normal      RIGHT ATRIUM: Size was normal      MITRAL VALVE: Valve structure was normal  There was normal leaflet separation  DOPPLER: The transmitral velocity was within the normal range  There was no evidence for stenosis  There was no regurgitation      AORTIC VALVE: The valve was trileaflet  Leaflets exhibited normal thickness and normal cuspal separation  DOPPLER: Transaortic velocity was within the normal range  There was no evidence for stenosis  There was no regurgitation      TRICUSPID VALVE: The valve structure was normal  There was normal leaflet separation  DOPPLER: The transtricuspid velocity was within the normal range  There was no evidence for stenosis  There was trace regurgitation      PULMONIC VALVE: Mild pulmonic stenosis  Pulmonary artery velocity 2 4 m/sec with a peak gradient of 22 mmHg  Mild-to-moderate pulmonic regurgitation      PERICARDIUM: There was no pericardial effusion  The pericardium was normal in appearance      AORTA: The root exhibited normal size      PULMONARY ARTERY: The size was normal  DOPPLER: Systolic pressure was within the normal range              Plan Factors-    Chart reviewed  EKG reviewed  Imaging results reviewed  Existing labs reviewed  Patient summary reviewed  Induction-     Postoperative Plan-     Informed Consent- Anesthetic plan and risks discussed with patient

## 2023-05-04 NOTE — PATIENT INSTRUCTIONS
Kick Counts in Pregnancy   AMBULATORY CARE:   Kick counts  measure how much your baby is moving in your womb  A kick from your baby can be felt as a twist, turn, swish, roll, or jab  It is common to feel your baby kicking at 26 to 28 weeks of pregnancy  You may feel your baby kick as early as 20 weeks of pregnancy  You may want to start counting at 28 weeks  Contact your doctor immediately if:   You feel a change in the number of kicks or movements of your baby  You feel fewer than 10 kicks within 2 hours  You have questions or concerns about your baby's movements  Why measure kick counts:  Your baby's movement may provide information about your baby's health  He or she may move less, or not at all, if there are problems  Your baby may move less if he or she is not getting enough oxygen or nutrition from the placenta  Do not smoke while you are pregnant  Smoking decreases the amount of oxygen that gets to your baby  Talk to your healthcare provider if you need help to quit smoking  Tell your healthcare provider as soon as you feel a change in your baby's movements  When to measure kick counts:   Measure kick counts at the same time every day  Measure kick counts when your baby is awake and most active  Your baby may be most active in the evening  How to measure kick counts:  Check that your baby is awake before you measure kick counts  You can wake up your baby by lightly pushing on your belly, walking, or drinking something cold  Your healthcare provider may tell you different ways to measure kick counts  You may be told to do the following:  Use a chart or clock to keep track of the time you start and finish counting  Sit in a chair or lie on your left side  Place your hands on the largest part of your belly  Count until you reach 10 kicks  Write down how much time it takes to count 10 kicks  It may take 30 minutes to 2 hours to count 10 kicks   It should not take more than 2 hours to count 10 kicks  Follow up with your doctor as directed:  Write down your questions so you remember to ask them during your visits  © Copyright Germaine Peñaloza 2022 Information is for End User's use only and may not be sold, redistributed or otherwise used for commercial purposes  The above information is an  only  It is not intended as medical advice for individual conditions or treatments  Talk to your doctor, nurse or pharmacist before following any medical regimen to see if it is safe and effective for you

## 2023-05-04 NOTE — ASSESSMENT & PLAN NOTE
mL  Hgb 12 5 --> 13 0 --> 11 8  Pain well controlled  Voiding spontaneously  Appropriate post-operative bowel function  Tolerating PO fluids and solids  Breastfeeding  Lochia wnl  • Continue routine postpartum care  • Postpartum DVT ppx with SCDs and Lovenox 40 mg daily (BMI 34)

## 2023-05-04 NOTE — ASSESSMENT & PLAN NOTE
Diagnosed with preeclampsia with severe features after sustained SRBP on arrival to L&D  Required acute IV treatment intrapartum with Labetalol 20/40/40/80 mg IV and Hydralazine 5/5/10 mg IV on 5/4/23 prior to delivery  Started on Procardia XL 30 mg daily on 5/4/23 prior to delivery  Procardia XL increased from 30 mg to 60 mg daily 5/7/23 due to elevated BP  S/p 24 hr postpartum magnesium for seizure ppx  Hgb 12 5 (admission) --> 13 0 (immediately post-op) --> stable on subsequent labs  Plt 137 (admission) --> 153 (immediately post-op) --> stable on subsequent labs  UP:C 0 61  CMP showed AST/ALT of 141/179 on 5/7  Increase of LFT on 5/8  -> 222/326   -> 233/389, tylenol discontinued     5/9 AM /314  Asympotmatic  Systolic (95MEE), OHZ:182 , Min:141 , ZTW:242   Diastolic (72FGL), KUZ:55, Min:64, Max:110    Last SRBP @ 0345 on 5/9    · Monitor BP  · 5/9: Plan to increase BP medication this morning to Procardia 120 mg XL qd this AM  Continue Metoprolol 25mg BID per cardiology recommendations  · Monitor for signs/symptoms of worsening preeclampsia

## 2023-05-05 ENCOUNTER — APPOINTMENT (INPATIENT)
Dept: NON INVASIVE DIAGNOSTICS | Facility: HOSPITAL | Age: 23
End: 2023-05-05

## 2023-05-05 PROBLEM — Z98.891 STATUS POST PRIMARY LOW TRANSVERSE CESAREAN SECTION: Status: ACTIVE | Noted: 2022-11-18

## 2023-05-05 LAB
ALBUMIN SERPL BCP-MCNC: 3.2 G/DL (ref 3.5–5)
ALBUMIN SERPL BCP-MCNC: 3.2 G/DL (ref 3.5–5)
ALBUMIN SERPL BCP-MCNC: 3.4 G/DL (ref 3.5–5)
ALP SERPL-CCNC: 119 U/L (ref 34–104)
ALP SERPL-CCNC: 132 U/L (ref 34–104)
ALP SERPL-CCNC: 143 U/L (ref 34–104)
ALT SERPL W P-5'-P-CCNC: 39 U/L (ref 7–52)
ALT SERPL W P-5'-P-CCNC: 40 U/L (ref 7–52)
ALT SERPL W P-5'-P-CCNC: 42 U/L (ref 7–52)
ANION GAP SERPL CALCULATED.3IONS-SCNC: 12 MMOL/L (ref 4–13)
ANION GAP SERPL CALCULATED.3IONS-SCNC: 6 MMOL/L (ref 4–13)
ANION GAP SERPL CALCULATED.3IONS-SCNC: 6 MMOL/L (ref 4–13)
AORTIC ROOT: 2.7 CM
AORTIC VALVE MEAN VELOCITY: 10.9 M/S
APICAL FOUR CHAMBER EJECTION FRACTION: 66 %
AST SERPL W P-5'-P-CCNC: 29 U/L (ref 13–39)
AST SERPL W P-5'-P-CCNC: 29 U/L (ref 13–39)
AST SERPL W P-5'-P-CCNC: 30 U/L (ref 13–39)
ATRIAL RATE: 58 BPM
AV AREA BY CONTINUOUS VTI: 1.4 CM2
AV AREA PEAK VELOCITY: 1.5 CM2
AV LVOT MEAN GRADIENT: 1 MMHG
AV LVOT PEAK GRADIENT: 2 MMHG
AV MEAN GRADIENT: 5 MMHG
AV PEAK GRADIENT: 11 MMHG
AV VALVE AREA: 1.41 CM2
AV VELOCITY RATIO: 0.47
BASOPHILS # BLD AUTO: 0.02 THOUSANDS/ÂΜL (ref 0–0.1)
BASOPHILS NFR BLD AUTO: 0 % (ref 0–1)
BILIRUB SERPL-MCNC: 0.23 MG/DL (ref 0.2–1)
BILIRUB SERPL-MCNC: 0.27 MG/DL (ref 0.2–1)
BILIRUB SERPL-MCNC: 0.3 MG/DL (ref 0.2–1)
BUN SERPL-MCNC: 12 MG/DL (ref 5–25)
BUN SERPL-MCNC: 14 MG/DL (ref 5–25)
BUN SERPL-MCNC: 17 MG/DL (ref 5–25)
CALCIUM ALBUM COR SERPL-MCNC: 7.9 MG/DL (ref 8.3–10.1)
CALCIUM ALBUM COR SERPL-MCNC: 8.3 MG/DL (ref 8.3–10.1)
CALCIUM ALBUM COR SERPL-MCNC: 9 MG/DL (ref 8.3–10.1)
CALCIUM SERPL-MCNC: 7.3 MG/DL (ref 8.4–10.2)
CALCIUM SERPL-MCNC: 7.7 MG/DL (ref 8.4–10.2)
CALCIUM SERPL-MCNC: 8.5 MG/DL (ref 8.4–10.2)
CHLORIDE SERPL-SCNC: 102 MMOL/L (ref 96–108)
CHLORIDE SERPL-SCNC: 103 MMOL/L (ref 96–108)
CHLORIDE SERPL-SCNC: 103 MMOL/L (ref 96–108)
CO2 SERPL-SCNC: 18 MMOL/L (ref 21–32)
CO2 SERPL-SCNC: 23 MMOL/L (ref 21–32)
CO2 SERPL-SCNC: 25 MMOL/L (ref 21–32)
CREAT SERPL-MCNC: 0.7 MG/DL (ref 0.6–1.3)
CREAT SERPL-MCNC: 0.72 MG/DL (ref 0.6–1.3)
CREAT SERPL-MCNC: 0.76 MG/DL (ref 0.6–1.3)
DOP CALC AO PEAK VEL: 1.64 M/S
DOP CALC AO VTI: 34.37 CM
DOP CALC LVOT AREA: 3.14 CM2
DOP CALC LVOT DIAMETER: 2 CM
DOP CALC LVOT PEAK VEL VTI: 15.38 CM
DOP CALC LVOT PEAK VEL: 0.77 M/S
DOP CALC LVOT STROKE INDEX: 24.5 ML/M2
DOP CALC LVOT STROKE VOLUME: 48.29 CM3
E WAVE DECELERATION TIME: 260 MS
EOSINOPHIL # BLD AUTO: 0 THOUSAND/ÂΜL (ref 0–0.61)
EOSINOPHIL NFR BLD AUTO: 0 % (ref 0–6)
ERYTHROCYTE [DISTWIDTH] IN BLOOD BY AUTOMATED COUNT: 12.7 % (ref 11.6–15.1)
ERYTHROCYTE [DISTWIDTH] IN BLOOD BY AUTOMATED COUNT: 12.9 % (ref 11.6–15.1)
ERYTHROCYTE [DISTWIDTH] IN BLOOD BY AUTOMATED COUNT: 13 % (ref 11.6–15.1)
FRACTIONAL SHORTENING: 29 % (ref 28–44)
GFR SERPL CREATININE-BSD FRML MDRD: 110 ML/MIN/1.73SQ M
GFR SERPL CREATININE-BSD FRML MDRD: 118 ML/MIN/1.73SQ M
GFR SERPL CREATININE-BSD FRML MDRD: 122 ML/MIN/1.73SQ M
GLUCOSE SERPL-MCNC: 113 MG/DL (ref 65–140)
GLUCOSE SERPL-MCNC: 140 MG/DL (ref 65–140)
GLUCOSE SERPL-MCNC: 96 MG/DL (ref 65–140)
HCT VFR BLD AUTO: 32.3 % (ref 34.8–46.1)
HCT VFR BLD AUTO: 34.6 % (ref 34.8–46.1)
HCT VFR BLD AUTO: 38.7 % (ref 34.8–46.1)
HGB BLD-MCNC: 10.9 G/DL (ref 11.5–15.4)
HGB BLD-MCNC: 11.8 G/DL (ref 11.5–15.4)
HGB BLD-MCNC: 13 G/DL (ref 11.5–15.4)
IMM GRANULOCYTES # BLD AUTO: 0.09 THOUSAND/UL (ref 0–0.2)
IMM GRANULOCYTES NFR BLD AUTO: 1 % (ref 0–2)
INTERVENTRICULAR SEPTUM IN DIASTOLE (PARASTERNAL SHORT AXIS VIEW): 1.2 CM
INTERVENTRICULAR SEPTUM: 1.2 CM (ref 0.6–1.1)
LEFT ATRIUM SIZE: 3 CM
LEFT INTERNAL DIMENSION IN SYSTOLE: 3.2 CM (ref 2.1–4)
LEFT VENTRICULAR INTERNAL DIMENSION IN DIASTOLE: 4.5 CM (ref 3.5–6)
LEFT VENTRICULAR POSTERIOR WALL IN END DIASTOLE: 1.3 CM
LEFT VENTRICULAR STROKE VOLUME: 51 ML
LVSV (TEICH): 51 ML
LYMPHOCYTES # BLD AUTO: 1.2 THOUSANDS/ÂΜL (ref 0.6–4.47)
LYMPHOCYTES NFR BLD AUTO: 6 % (ref 14–44)
MAGNESIUM SERPL-MCNC: 5.4 MG/DL (ref 1.9–2.7)
MAGNESIUM SERPL-MCNC: 5.9 MG/DL (ref 1.9–2.7)
MAGNESIUM SERPL-MCNC: 6.3 MG/DL (ref 1.9–2.7)
MCH RBC QN AUTO: 31.9 PG (ref 26.8–34.3)
MCH RBC QN AUTO: 32.2 PG (ref 26.8–34.3)
MCH RBC QN AUTO: 32.2 PG (ref 26.8–34.3)
MCHC RBC AUTO-ENTMCNC: 33.6 G/DL (ref 31.4–37.4)
MCHC RBC AUTO-ENTMCNC: 33.7 G/DL (ref 31.4–37.4)
MCHC RBC AUTO-ENTMCNC: 34.1 G/DL (ref 31.4–37.4)
MCV RBC AUTO: 94 FL (ref 82–98)
MCV RBC AUTO: 95 FL (ref 82–98)
MCV RBC AUTO: 95 FL (ref 82–98)
MONOCYTES # BLD AUTO: 0.41 THOUSAND/ÂΜL (ref 0.17–1.22)
MONOCYTES NFR BLD AUTO: 2 % (ref 4–12)
MV E'TISSUE VEL-LAT: 11 CM/S
MV E'TISSUE VEL-SEP: 12 CM/S
MV PEAK A VEL: 0.71 M/S
MV PEAK E VEL: 77 CM/S
NEUTROPHILS # BLD AUTO: 17.26 THOUSANDS/ÂΜL (ref 1.85–7.62)
NEUTS SEG NFR BLD AUTO: 91 % (ref 43–75)
NRBC BLD AUTO-RTO: 0 /100 WBCS
P AXIS: 36 DEGREES
PLATELET # BLD AUTO: 149 THOUSANDS/UL (ref 149–390)
PLATELET # BLD AUTO: 153 THOUSANDS/UL (ref 149–390)
PLATELET # BLD AUTO: 156 THOUSANDS/UL (ref 149–390)
PMV BLD AUTO: 13.1 FL (ref 8.9–12.7)
PMV BLD AUTO: 13.2 FL (ref 8.9–12.7)
PMV BLD AUTO: 13.6 FL (ref 8.9–12.7)
POTASSIUM SERPL-SCNC: 4 MMOL/L (ref 3.5–5.3)
POTASSIUM SERPL-SCNC: 4.5 MMOL/L (ref 3.5–5.3)
POTASSIUM SERPL-SCNC: 4.6 MMOL/L (ref 3.5–5.3)
PR INTERVAL: 124 MS
PROT SERPL-MCNC: 6.1 G/DL (ref 6.4–8.4)
PROT SERPL-MCNC: 6.2 G/DL (ref 6.4–8.4)
PROT SERPL-MCNC: 6.5 G/DL (ref 6.4–8.4)
PV PEAK GRADIENT: 11 MMHG
QRS AXIS: 74 DEGREES
QRSD INTERVAL: 82 MS
QT INTERVAL: 474 MS
QTC INTERVAL: 465 MS
RBC # BLD AUTO: 3.39 MILLION/UL (ref 3.81–5.12)
RBC # BLD AUTO: 3.67 MILLION/UL (ref 3.81–5.12)
RBC # BLD AUTO: 4.08 MILLION/UL (ref 3.81–5.12)
SL CV PED ECHO LEFT VENTRICLE DIASTOLIC VOLUME (MOD BIPLANE) 2D: 91 ML
SL CV PED ECHO LEFT VENTRICLE SYSTOLIC VOLUME (MOD BIPLANE) 2D: 40 ML
SODIUM SERPL-SCNC: 131 MMOL/L (ref 135–147)
SODIUM SERPL-SCNC: 133 MMOL/L (ref 135–147)
SODIUM SERPL-SCNC: 134 MMOL/L (ref 135–147)
T WAVE AXIS: 43 DEGREES
TREPONEMA PALLIDUM IGG+IGM AB [PRESENCE] IN SERUM OR PLASMA BY IMMUNOASSAY: NORMAL
URATE SERPL-MCNC: 7 MG/DL (ref 2–7.5)
VENTRICULAR RATE: 58 BPM
WBC # BLD AUTO: 18.98 THOUSAND/UL (ref 4.31–10.16)
WBC # BLD AUTO: 20.25 THOUSAND/UL (ref 4.31–10.16)
WBC # BLD AUTO: 20.89 THOUSAND/UL (ref 4.31–10.16)

## 2023-05-05 RX ORDER — IBUPROFEN 600 MG/1
600 TABLET ORAL EVERY 6 HOURS PRN
Status: DISCONTINUED | OUTPATIENT
Start: 2023-05-06 | End: 2023-05-06

## 2023-05-05 RX ORDER — OXYCODONE HYDROCHLORIDE 5 MG/1
5 TABLET ORAL EVERY 4 HOURS PRN
Status: DISCONTINUED | OUTPATIENT
Start: 2023-05-05 | End: 2023-05-09 | Stop reason: HOSPADM

## 2023-05-05 RX ORDER — KETOROLAC TROMETHAMINE 30 MG/ML
15 INJECTION, SOLUTION INTRAMUSCULAR; INTRAVENOUS EVERY 6 HOURS PRN
Status: DISPENSED | OUTPATIENT
Start: 2023-05-05 | End: 2023-05-06

## 2023-05-05 RX ORDER — SIMETHICONE 80 MG
80 TABLET,CHEWABLE ORAL 4 TIMES DAILY PRN
Status: DISCONTINUED | OUTPATIENT
Start: 2023-05-05 | End: 2023-05-09 | Stop reason: HOSPADM

## 2023-05-05 RX ORDER — CALCIUM CARBONATE 200(500)MG
1000 TABLET,CHEWABLE ORAL DAILY PRN
Status: DISCONTINUED | OUTPATIENT
Start: 2023-05-05 | End: 2023-05-09 | Stop reason: HOSPADM

## 2023-05-05 RX ORDER — TRISODIUM CITRATE DIHYDRATE AND CITRIC ACID MONOHYDRATE 500; 334 MG/5ML; MG/5ML
30 SOLUTION ORAL 4 TIMES DAILY PRN
Status: DISCONTINUED | OUTPATIENT
Start: 2023-05-05 | End: 2023-05-09 | Stop reason: HOSPADM

## 2023-05-05 RX ORDER — ACETAMINOPHEN 325 MG/1
650 TABLET ORAL EVERY 6 HOURS SCHEDULED
Status: DISCONTINUED | OUTPATIENT
Start: 2023-05-05 | End: 2023-05-08

## 2023-05-05 RX ORDER — OXYCODONE HYDROCHLORIDE 10 MG/1
10 TABLET ORAL EVERY 4 HOURS PRN
Status: DISCONTINUED | OUTPATIENT
Start: 2023-05-05 | End: 2023-05-09 | Stop reason: HOSPADM

## 2023-05-05 RX ORDER — ENOXAPARIN SODIUM 100 MG/ML
40 INJECTION SUBCUTANEOUS
Status: DISCONTINUED | OUTPATIENT
Start: 2023-05-05 | End: 2023-05-09 | Stop reason: HOSPADM

## 2023-05-05 RX ORDER — OXYTOCIN/RINGER'S LACTATE 30/500 ML
62.5 PLASTIC BAG, INJECTION (ML) INTRAVENOUS ONCE
Status: COMPLETED | OUTPATIENT
Start: 2023-05-05 | End: 2023-05-05

## 2023-05-05 RX ORDER — SODIUM CHLORIDE, SODIUM LACTATE, POTASSIUM CHLORIDE, CALCIUM CHLORIDE 600; 310; 30; 20 MG/100ML; MG/100ML; MG/100ML; MG/100ML
125 INJECTION, SOLUTION INTRAVENOUS CONTINUOUS
Status: DISCONTINUED | OUTPATIENT
Start: 2023-05-05 | End: 2023-05-05

## 2023-05-05 RX ORDER — DIAPER,BRIEF,INFANT-TODD,DISP
1 EACH MISCELLANEOUS DAILY PRN
Status: DISCONTINUED | OUTPATIENT
Start: 2023-05-05 | End: 2023-05-09 | Stop reason: HOSPADM

## 2023-05-05 RX ORDER — DOCUSATE SODIUM 100 MG/1
100 CAPSULE, LIQUID FILLED ORAL 2 TIMES DAILY
Status: DISCONTINUED | OUTPATIENT
Start: 2023-05-05 | End: 2023-05-09 | Stop reason: HOSPADM

## 2023-05-05 RX ORDER — ONDANSETRON 2 MG/ML
4 INJECTION INTRAMUSCULAR; INTRAVENOUS EVERY 8 HOURS PRN
Status: DISCONTINUED | OUTPATIENT
Start: 2023-05-05 | End: 2023-05-09 | Stop reason: HOSPADM

## 2023-05-05 RX ORDER — DIPHENHYDRAMINE HCL 25 MG
25 TABLET ORAL EVERY 6 HOURS PRN
Status: DISCONTINUED | OUTPATIENT
Start: 2023-05-05 | End: 2023-05-09 | Stop reason: HOSPADM

## 2023-05-05 RX ADMIN — DOCUSATE SODIUM 100 MG: 100 CAPSULE, LIQUID FILLED ORAL at 09:31

## 2023-05-05 RX ADMIN — ACETAMINOPHEN 325MG 650 MG: 325 TABLET ORAL at 18:16

## 2023-05-05 RX ADMIN — NIFEDIPINE 30 MG: 30 TABLET, FILM COATED, EXTENDED RELEASE ORAL at 09:31

## 2023-05-05 RX ADMIN — ACETAMINOPHEN 325MG 975 MG: 325 TABLET ORAL at 10:20

## 2023-05-05 RX ADMIN — ENOXAPARIN SODIUM 40 MG: 100 INJECTION SUBCUTANEOUS at 09:31

## 2023-05-05 RX ADMIN — SODIUM CHLORIDE, SODIUM LACTATE, POTASSIUM CHLORIDE, AND CALCIUM CHLORIDE 125 ML/HR: .6; .31; .03; .02 INJECTION, SOLUTION INTRAVENOUS at 14:31

## 2023-05-05 RX ADMIN — MAGNESIUM SULFATE HEPTAHYDRATE 2 G/HR: 40 INJECTION, SOLUTION INTRAVENOUS at 05:11

## 2023-05-05 RX ADMIN — ACETAMINOPHEN 325MG 650 MG: 325 TABLET ORAL at 23:49

## 2023-05-05 RX ADMIN — KETOROLAC TROMETHAMINE 15 MG: 30 INJECTION, SOLUTION INTRAMUSCULAR; INTRAVENOUS at 07:17

## 2023-05-05 RX ADMIN — Medication 62.5 MILLI-UNITS/MIN: at 00:45

## 2023-05-05 RX ADMIN — KETOROLAC TROMETHAMINE 15 MG: 30 INJECTION, SOLUTION INTRAMUSCULAR; INTRAVENOUS at 19:16

## 2023-05-05 RX ADMIN — KETOROLAC TROMETHAMINE 15 MG: 30 INJECTION, SOLUTION INTRAMUSCULAR; INTRAVENOUS at 13:14

## 2023-05-05 RX ADMIN — DOCUSATE SODIUM 100 MG: 100 CAPSULE, LIQUID FILLED ORAL at 18:16

## 2023-05-05 RX ADMIN — ACETAMINOPHEN 325MG 650 MG: 325 TABLET ORAL at 01:52

## 2023-05-05 RX ADMIN — MAGNESIUM SULFATE HEPTAHYDRATE 2 G/HR: 40 INJECTION, SOLUTION INTRAVENOUS at 15:09

## 2023-05-05 NOTE — DISCHARGE INSTRUCTIONS

## 2023-05-05 NOTE — PROGRESS NOTES
Progress Note - OB/GYN  Thai Hugo 20 y o  female MRN: [id number]  Unit/Bed#: L&D 326-01 Encounter: [id number]    Assessment and Plan:  Thai Hugo is a patient of: [facility]  She is PPD# 1 s/p  primary  section, low transverse incision for worsening preeclampsia and subsequent FHR decelerations requiring STAT  Now recovering well and stable  By issue:    * Status post primary low transverse  section  Assessment & Plan   mL  Hgb 12 5 --> 13 0 --> follow up 8 am CBC  Pain well controlled  Sung catheter in place with adequate urine output  No return of bowel function so far  Tolerating PO fluids and solids  Breastfeeding  Lochia wnl  • Continue routine postpartum care  • Trend urine output and follow up void trial after sung removal  • Monitor for return of bowel function  • Postpartum DVT ppx with SCDs and Lovenox 40 mg daily (BMI 34)    Thrombocytopenia affecting pregnancy, antepartum (HCC)  Assessment & Plan  Platelets [id number] since admission, stable  · Monitor with q6h preeclampsia labs    Pre-eclampsia in third trimester  Assessment & Plan  Diagnosed with preeclampsia with severe features after sustained SRBP on arrival to L&D  Required acute IV treatment intrapartum with Labetalol 20/40/40/80 mg IV and Hydralazine 5/5/10 mg IV on 23 prior to delivery  Started on Procardia XL 30 mg daily on 23 prior to delivery  On magnesium for seizure ppx    Hgb 12 5 (admission) --> 13 0 (immediately post-op) --> follow up 8 am CBC  Plt 137 (admission) --> 153 (immediately post-op) --> follow up 8 am CBC  UP:C 8 58  CMP wnl  Systolic (69LCD), PRV:930 , Min:118 , EHD:148   Diastolic (16GJZ), YFI:47, Min:66, Max:111    · Monitor BP  · Continue Procardia  · Monitor for signs/symptoms of worsening preeclampsia  · Follow up q6h labs  · Continue magnesium for 24 hr PP for seizure ppx    Anemia during pregnancy in third trimester  Assessment & Plan  Admission Hgb "12 5  Congenital pulmonary stenosis  Assessment & Plan  Last maternal echo  with mild stenosis and mild to moderate regurgitation  Fetal cardiology - echo is normal - by peds Dr Chidi Diez  Cardiology contacted via phone regarding recommendations for  versus trial of labor and recommendations for antihypertensives  Per on call cardiologist -no contraindication against attempted vaginal delivery or  at this time both labetalol and hydralazine are okay to use for antihypertensive  · Echo ordered for today, follow up results      Disposition    - Anticipate discharge home on PPD# 3      Subjective/Objective     Chief Complaint: Postpartum State     Subjective:    Oscar Sheehan is PPD/POD#1 s/p  primary  section, low transverse incisionfor worsening preeclampsia and subsequent FHR decelerations requiring STAT  She has no current complaints  Pain is well controlled  Patient is not currently voiding as her sung catheter remains in place  She is not yet ambulating  Patient is not currently passing flatus and has had no bowel movement  She is tolerating PO fluids but hasn't tried solids yet, and denies nausea or vomitting  Patient denies fever, chills, chest pain, shortness of breath, or calf tenderness  Lochia is normal  She is  Breastfeeding  She denies headache, vision changes, chest pain, SOB, RUQ/epigastric pain  She is recovering well and is stable  Vitals:   /79   Pulse 70   Temp 98 4 °F (36 9 °C) (Oral)   Resp 16   Ht 5' 3\" (1 6 m)   Wt 89 4 kg (197 lb)   LMP 2022   SpO2 96%   Breastfeeding Unknown   BMI 34 90 kg/m²       Intake/Output Summary (Last 24 hours) at 2023 0625  Last data filed at 2023 0215  Gross per 24 hour   Intake 1000 ml   Output 528 ml   Net 472 ml       Invasive Devices     Peripheral Intravenous Line  Duration           Peripheral IV 23 Dorsal (posterior); Left Forearm <1 day          Drain  Duration           Urethral " Catheter Double-lumen;Non-latex 16 Fr  <1 day                Physical Exam:   GEN: Latisha Correia appears well, alert and oriented x 3, pleasant and cooperative   CARDIO: RRR, no murmurs or rubs  RESP:  CTAB, no wheezes or rales  ABDOMEN: soft, no tenderness, no distention, fundus @ U -1, Incision C/D/I  EXTREMITIES: SCDs on, non tender, no erythema, b/l Emily's sign negative      Labs:     Hemoglobin   Date Value Ref Range Status   05/05/2023 13 0 11 5 - 15 4 g/dL Final   05/04/2023 12 5 11 5 - 15 4 g/dL Final     WBC   Date Value Ref Range Status   05/05/2023 18 98 (H) 4 31 - 10 16 Thousand/uL Final   05/04/2023 15 26 (H) 4 31 - 10 16 Thousand/uL Final     Platelets   Date Value Ref Range Status   05/05/2023 153 149 - 390 Thousands/uL Final   05/04/2023 149 149 - 390 Thousands/uL Final     Creatinine   Date Value Ref Range Status   05/05/2023 0 72 0 60 - 1 30 mg/dL Final     Comment:     Standardized to IDMS reference method   05/04/2023 0 73 0 60 - 1 30 mg/dL Final     Comment:     Standardized to IDMS reference method     AST   Date Value Ref Range Status   05/05/2023 29 13 - 39 U/L Final   05/04/2023 34 13 - 39 U/L Final     Comment:     Slightly Hemolyzed:Results may be affected  ALT   Date Value Ref Range Status   05/05/2023 42 7 - 52 U/L Final     Comment:     Specimen collection should occur prior to Sulfasalazine administration due to the potential for falsely depressed results  05/04/2023 46 7 - 52 U/L Final     Comment:     Specimen collection should occur prior to Sulfasalazine administration due to the potential for falsely depressed results             Obed Hicks MD  5/5/2023  6:25 AM

## 2023-05-05 NOTE — H&P
Progress Note - OB/GYN  Teddy Nails 20 y o  female MRN: [id number]  Unit/Bed#: L&D 326-01 Encounter: [id number]    Assessment and Plan:  Teddy Nails is a patient of: [facility]  She is PPD# 1 s/p  primary  section, low transverse incision for worsening preeclampsia and subsequent FHR decelerations requiring STAT  Now recovering well and stable  By issue:    * Status post primary low transverse  section  Assessment & Plan   mL  Hgb 12 5 --> 13 0 --> follow up 8 am CBC  Pain well controlled  Sung catheter in place with adequate urine output  No return of bowel function so far  Tolerating PO fluids and solids  Breastfeeding  Lochia wnl  • Continue routine postpartum care  • Trend urine output and follow up void trial after sung removal  • Monitor for return of bowel funciton    Thrombocytopenia affecting pregnancy, antepartum ([facility] [address] )  Assessment & Plan  Platelets [id number] since admission, stable  · Monitor with q6h preeclampsia labs    Pre-eclampsia in third trimester  Assessment & Plan  Diagnosed with preeclampsia with severe features after sustained SRBP on arrival to L&D  Required acute IV treatment intrapartum with Labetalol 20/40/40/80 mg IV and Hydralazine 5/5/10 mg IV on 23 prior to delivery  Started on Procardia XL 30 mg daily on 23 prior to delivery  On magnesium for seizure ppx  Hgb 12 5 (admission) --> 13 0 (immediately post-op) --> follow up 8 am CBC  Plt 137 (admission) --> 153 (immediately post-op) --> follow up 8 am CBC  UP:C 6 78  CMP wnl  Systolic (96EBU), JAM:185 , Min:118 , HZP:168   Diastolic (22VBY), AX, Min:66, Max:111    · Monitor BP  · Continue Procardia  · Monitor for signs/symptoms of worsening preeclampsia  · Follow up q6h labs  · Continue magnesium for 24 hr PP for seizure ppx    Anemia during pregnancy in third trimester  Assessment & Plan  Admission Hgb 12 5      Congenital pulmonary stenosis  Assessment & Plan  Last maternal echo "2019 with mild stenosis and mild to moderate regurgitation  Fetal cardiology - echo is normal - by peds Dr Wilfredo Malagon  Cardiology contacted via phone regarding recommendations for  versus trial of labor and recommendations for antihypertensives  Per on call cardiologist -no contraindication against attempted vaginal delivery or  at this time both labetalol and hydralazine are okay to use for antihypertensive  · Echo ordered for today, follow up results      Disposition    - Anticipate discharge home on PPD# 3      Subjective/Objective     Chief Complaint: Postpartum State     Subjective:    Naomi Escamilla is PPD/POD#1 s/p  primary  section, low transverse incisionfor worsening preeclampsia and subsequent FHR decelerations requiring STAT  She has no current complaints  Pain is well controlled  Patient is not currently voiding as her sung catheter remains in place  She is not yet ambulating  Patient is not currently passing flatus and has had no bowel movement  She is tolerating PO fluids but hasn't tried solids yet, and denies nausea or vomitting  Patient denies fever, chills, chest pain, shortness of breath, or calf tenderness  Lochia is normal  She is  Breastfeeding  She denies headache, vision changes, chest pain, SOB, RUQ/epigastric pain  She is recovering well and is stable  Vitals:   /79   Pulse 70   Temp 98 4 °F (36 9 °C) (Oral)   Resp 16   Ht 5' 3\" (1 6 m)   Wt 89 4 kg (197 lb)   LMP 2022   SpO2 96%   Breastfeeding Unknown   BMI 34 90 kg/m²       Intake/Output Summary (Last 24 hours) at 2023 0625  Last data filed at 2023 0215  Gross per 24 hour   Intake 1000 ml   Output 528 ml   Net 472 ml       Invasive Devices     Peripheral Intravenous Line  Duration           Peripheral IV 23 Dorsal (posterior); Left Forearm <1 day          Drain  Duration           Urethral Catheter Double-lumen;Non-latex 16 Fr  <1 day                Physical Exam: " GEN: Venice Martin appears well, alert and oriented x 3, pleasant and cooperative   CARDIO: RRR, no murmurs or rubs  RESP:  CTAB, no wheezes or rales  ABDOMEN: soft, no tenderness, no distention, fundus @ U -1, Incision C/D/I  EXTREMITIES: SCDs on, non tender, no erythema, b/l Emily's sign negative      Labs:     Hemoglobin   Date Value Ref Range Status   05/05/2023 13 0 11 5 - 15 4 g/dL Final   05/04/2023 12 5 11 5 - 15 4 g/dL Final     WBC   Date Value Ref Range Status   05/05/2023 18 98 (H) 4 31 - 10 16 Thousand/uL Final   05/04/2023 15 26 (H) 4 31 - 10 16 Thousand/uL Final     Platelets   Date Value Ref Range Status   05/05/2023 153 149 - 390 Thousands/uL Final   05/04/2023 149 149 - 390 Thousands/uL Final     Creatinine   Date Value Ref Range Status   05/05/2023 0 72 0 60 - 1 30 mg/dL Final     Comment:     Standardized to IDMS reference method   05/04/2023 0 73 0 60 - 1 30 mg/dL Final     Comment:     Standardized to IDMS reference method     AST   Date Value Ref Range Status   05/05/2023 29 13 - 39 U/L Final   05/04/2023 34 13 - 39 U/L Final     Comment:     Slightly Hemolyzed:Results may be affected  ALT   Date Value Ref Range Status   05/05/2023 42 7 - 52 U/L Final     Comment:     Specimen collection should occur prior to Sulfasalazine administration due to the potential for falsely depressed results  05/04/2023 46 7 - 52 U/L Final     Comment:     Specimen collection should occur prior to Sulfasalazine administration due to the potential for falsely depressed results             Tana Whiting MD  5/5/2023  6:25 AM

## 2023-05-05 NOTE — PLAN OF CARE

## 2023-05-05 NOTE — CONSULTS
ENCOUNTER DATE: 23 9:01 AM  PATIENT NAME: Leena Rodriguez   2000    [id number]  Age: 20 y o  Sex: female  [place] PROVIDER & AUTHOR: Prachi Doll MD  INPATIENT ATTENDING PHYSICIAN: Johnathon Coleman*; PRIMARYCARE PHYSICIAN: Mickie Rosa PA-C  DATE OF ADMISSION: 2023  5:10 PM; LENGTH OF STAY: 1 days  *-*-*-*-*-*-*-*-*-*-*-*-*-*-*-*-*-*-*-*-*-*-*-*-*-*-*-*-*-*-*-*-*-*-*-*-*-*-*-*-*-*-*-*-*-*-*-*-*-*-*-*-*-*-   REASON FOR CONSULTATION:   Comanagement for hypertension and preeclampsia in third trimester now status post low transverse   *-*-*-*-*-*-*-*-*-*-*-*-*-*-*-*-*-*-*-*-*-*-*-*-*-*-*-*-*-*-*-*-*-*-*-*-*-*-*-*-*-*-*-*-*-*-*-*-*-*-*-*-*-*-  CARDIAC ASSESSMENT:     1  Preeclampsia with hypertension, without symptoms  ,  Minimal proteinuria  2  POD1 s/p low transverse  section  3  History of pulmonic valve stenosis and regurgitation  4  Anemia during pregnancy  5  Borderline thrombocytopenia  6  Hypertensive heart disease without heart failure     Patient Active Problem List   Diagnosis   • Heart murmur   • Physical exam   • Pulmonic stenosis   • Hair loss   • Dermoid cyst of right ovary   • Left wrist tendonitis   • Vertigo   • Status post primary low transverse  section   • Prenatal care in second trimester   • Congenital pulmonary stenosis   • Hereditary disease in family possibly affecting fetus, affecting management of mother, antepartum condition or complication   • Anemia during pregnancy in third trimester   • Pre-eclampsia in third trimester   • Thrombocytopenia affecting pregnancy, antepartum (HCC)   • Positive GBS test        Patient has history of isolated pulmonic valve stenosis and regurgitation but clinical examination and by previous echocardiogram   She gives no history and she does not have obvious extracardiac manifestations that would suggest chromosomal abnormality or concurrent ASD or VSD or coarctation of aorta or Salazar syndrome    Her echocardiogram today did not identify evidence of outflow tract obstruction or interatrial or interventricular septal defects  She has peripheral edema relating to her pregnancy and  however her echocardiogram suggest intravascular volume depletion  Pressure is currently reasonably controlled on calcium channel blocker therapy with nifedipine Xl    CARDIAC PLAN:     --Recommend continuing current medications  Watch for side effects such as Headache, tachycardia, palpitations, facial flushing, worsening peripheral edema  -- Monitor for profound hypotension especially in the setting of concurrent magnesium infusion  -- Request low-sodium diet  -- If patient asymptomatic tomorrow and blood pressures are consistently less than 140/80 mmHg then she can be discharged home on current regimen  We will make arrangements for outpatient follow-up in office in 2 to 3 weeks  -- Recommend that patient get a home blood pressure monitor and start regarding intermittent blood pressures at home  -- Continued post  care  -- Patient should be made aware that nifedipine like many other medications is a pregnancy category C drug  It may pass into the breastmilk in very small amounts, however it has not been known to cause serious side effects in breast-fed babies  Discussed with patient and her  in detail about patient's new diagnosis of preeclampsia and possible chronic hypertension and the need for precautions and monitoring  Also discussed about the side effects of Procardia    She is advised to reach out to us if she develops any worsening shortness of breath or lower extremity swelling or facial flushing or lightheadedness or dizziness or any concerning symptoms following discharge from the hospital             *-*-*-*-*-*-*-*-*-*-*-*-*-*-*-*-*-*-*-*-*-*-*-*-*-*-*-*-*-*-*-*-*-*-*-*-*-*-*-*-*-*-*-*-*-*-*-*-*-*-*-*-*-*-  HISTORY OF PRESENT ILLNESS     Patient is a 62-year-old young female with prior medical history significant for congenital pulmonary stenosis and regurgitation initially detected in , history of vertigo and ovarian cyst and previously on intrauterine device for for pregnancy prevention  She was recognized to have pregnancy in 2022 and underwent removal of her previously implanted IUD  She was getting routine prenatal care  She was diagnosed with gestational hypertension in her third trimester and was being monitored for preeclampsia  She was also noted to have gestational thrombocytopenia  She started to develop intermittent vomitings  Her diastolic blood pressure had been noted to be intermittently elevated but she was not on any medications  During her most recent prenatal visit yesterday morning patient was noted to have markedly elevated blood pressures with systolic blood pressure in 931X and diastolic blood pressure in 90s  to 100s  Due to concerns for evolving preeclampsia she was referred to the hospital for admission  She was noted to have sustained elevated blood pressures and so was confirmed to have preeclampsia and started on treatment with magnesium bolus and IV labetalol  Maternal-fetal medicine was consulted and induction of labor was advised  Following multiple disciplinary team meeting yesterday evening decision was made to proceed with low transverse   Cardiology team was consulted for patient's elevated blood pressures and history of congenital pulmonic valve stenosis  Patient was started on labetalol and hydralazine and subsequently Procardia XL was added   was performed under epidural anesthesia and viable female  was delivered at 2234 hrs  Surgical quantitative blood loss was reported as 133 mL  Overnight patient has done well  She has had no headaches or visual symptoms or nausea or vomiting  Her blood pressures have been stable    Currently she denies any chest pain or shortness of breath or dizziness or lightness or palpitations  Incision pain is controlled  Patient mentions that she was diagnosed with unspecified heart condition at birth and was seeing a cardiologist every few years  She moved to [facility] from [place] [address] in 2006  She reports that she last saw a cardiologist in 2019  She is unable to recall who she saw and she does not have any record of prior work-up and diagnosis  She mentions that she had no symptoms related to her heart condition  She had no symptoms during her childhood or adolescence  She reports being active and played softball  She denies any syncopal events  She reports being very active even now  Work she works as an inventor manager for a [employer]  She mentions that she was not on any antihypertensive medications  She denies having any significant symptoms including chest pain or palpitations presyncope/syncope severe nausea vomiting or visual abnormalities during her pregnancy  She denies any history of smoking or alcohol use or illicit drug use  She mentions that her maternal grandfather had coronary artery disease in his 62s  There is no family history of premature CAD or sudden cardiac death  She has no known drug allergies  She is currently receiving nifedipine 24-hour tablet 30 mg daily  She has received as needed IV labetalol and hydralazine prior to induction of labor  She has been receiving magnesium sulfate IV for her preeclampsia diagnosis and the plan is to continue this till tomorrow      *-*-*-*-*-*-*-*-*-*-*-*-*-*-*-*-*-*-*-*-*-*-*-*-*-*-*-*-*-*-*-*-*-*-*-*-*-*-*-*-*-*-*-*-*-*-*-*-*-*-*-*-*-*  PAST MEDICAL HISTORY:     Past Medical History:   Diagnosis Date   • Heart murmur    • Right ovarian cyst 06/02/2020   • Varicella     PAST SURGICAL HISTORY     Past Surgical History:   Procedure Laterality Date   • TONSILLECTOMY            FAMILY HISTORY     Family History   Problem Relation Age of Onset   • No Known Problems Mother    • No Known Problems Father    • No Known Problems Brother    • Diabetes Maternal Grandmother    • Coronary artery disease Maternal Grandfather    • Lung cancer Paternal Grandmother    • Diabetes Paternal Grandfather    • Breast cancer Paternal Aunt         45s   • Liver cancer Paternal Aunt    • Colon cancer Neg Hx    • Ovarian cancer Neg Hx      SOCIAL HISTORY     Social History     Tobacco Use   Smoking Status Never   Smokeless Tobacco Never      Social History     Substance and Sexual Activity   Alcohol Use Not Currently     Social History     Substance and Sexual Activity   Drug Use Never    [id number]     *-*-*-*-*-*-*-*-*-*-*-*-*-*-*-*-*-*-*-*-*-*-*-*-*-*-*-*-*-*-*-*-*-*-*-*-*-*-*-*-*-*-*-*-*-*-*-*-*-*-*-*-*-*  ALLERGIES     No Known Allergies  CURRENT SCHEDULED MEDICATIONS       Current Facility-Administered Medications:   •  acetaminophen (TYLENOL) tablet 650 mg, 650 mg, Oral, Q6H [facility], Martin Vasquez MD, 650 mg at 05/05/23 0152  •  acetaminophen (TYLENOL) tablet 975 mg, 975 mg, Oral, Q6H PRN, Martin Vasquez MD  •  benzocaine-menthol-lanolin-aloe (DERMOPLAST) 20-0 5 % topical spray 1 application  , 1 application  , Topical, Q6H PRN, Martin Vasquez MD  •  calcium carbonate (TUMS) chewable tablet 1,000 mg, 1,000 mg, Oral, Daily PRN, Martin Vasquez MD  •  diphenhydrAMINE (BENADRYL) injection 25 mg, 25 mg, Intravenous, Q6H PRN, Martin Vasquez MD  •  diphenhydrAMINE (BENADRYL) tablet 25 mg, 25 mg, Oral, Q6H PRN, Martin Vasquez MD  •  docusate sodium (COLACE) capsule 100 mg, 100 mg, Oral, BID, Martin Vasquez MD  •  enoxaparin (LOVENOX) subcutaneous injection 40 mg, 40 mg, Subcutaneous, Q24H [facility], Valentino Frater, MD  •  hydrocortisone 1 % cream 1 application  , 1 application  , Topical, Daily PRN, Martin Vasquez MD  •  HYDROmorphone (DILAUDID) injection 0 5 mg, 0 5 mg, Intravenous, Q2H PRN, Martin Vasquez MD  •  ketorolac (TORADOL) injection 15 mg, 15 mg, Intravenous, Q6H PRN, 15 mg at 05/05/23 0717 **FOLLOWED BY** [START ON 5/6/2023] ibuprofen (MOTRIN) tablet 600 mg, 600 mg, Oral, Q6H PRN, Keon Howard MD  •  lactated ringers infusion, 125 mL/hr, Intravenous, Continuous, Keon Howard MD, Last Rate: 50 mL/hr at 05/04/23 2330, 50 mL/hr at 05/04/23 2330  •  magnesium sulfate 20 g/500 mL infusion (premix), 2 g/hr, Intravenous, Continuous, Keon Howard MD, Last Rate: 50 mL/hr at 05/05/23 0511, 2 g/hr at 05/05/23 0511  •  nalbuphine (NUBAIN) injection 3 mg, 3 mg, Intravenous, Q3H PRN, Keon Howard MD  •  naloxone [facility]) injection 0 1 mg, 0 1 mg, Intravenous, Q3 min PRN, Keon Howard MD  •  NIFEdipine (PROCARDIA XL) 24 hr tablet 30 mg, 30 mg, Oral, Daily, Keon Howadr MD, 30 mg at 05/04/23 2107  •  ondansetron [facility]) injection 4 mg, 4 mg, Intravenous, Q6H PRN, Keon Howard MD  •  ondansetron [facility]) injection 4 mg, 4 mg, Intravenous, Q8H PRN, Keon Howard MD  •  oxyCODONE (ROXICODONE) IR tablet 5 mg, 5 mg, Oral, Q4H PRN **OR** oxyCODONE (ROXICODONE) IR tablet 10 mg, 10 mg, Oral, Q4H PRN, Keon Howard MD  •  oxyCODONE (ROXICODONE) IR tablet 5 mg, 5 mg, Oral, Q4H PRN, Keon Howard MD  •  oxytocin (PITOCIN) 10 units/mL injection **ADS Override Pull**, , , ,   •  phenylephrine 100 mcg/mL syringe **ADS Override Pull**, , , ,   •  simethicone (MYLICON) chewable tablet 80 mg, 80 mg, Oral, 4x Daily PRN, Keon Howard MD  •  sod citrate-citric acid (BICITRA) oral solution 30 mL, 30 mL, Oral, 4x Daily PRN, Keon Howard MD  •  witch hazel-glycerin (TUCKS) topical pad 1 pad , 1 pad , Topical, Q4H PRN, Keon Howard MD     *-*-*-*-*-*-*-*-*-*-*-*-*-*-*-*-*-*-*-*-*-*-*-*-*-*-*-*-*-*-*-*-*-*-*-*-*-*-*-*-*-*-*-*-*-*-*-*-*-*-*-*-*-*   REVIEW OF SYSTEMS     Positive for: As noted above in HPI  Negative for:  All remaining as reviewed below and in "HPI  SYSTEM SYMPTOMS REVIEWED:  General--weight change, fever, night sweats  Respiratoryl-- Wheezing, shortness of breath, cough, URI symptoms, sputum, blood  Cardiovascular--chest pain, syncope, dyspnea on exertion, edema, decline in exercise tolerance, claudication   Gastrointestinal--persistent vomiting, diarrhea, abdominal distention, blood in stool   Muscular or skeletal--joint pain or swelling   Neurologic--headaches, syncope, abnormal movement  Hematologic--history of easy bruising and bleeding   Endocrine--thyroid enlargement, heat or cold intolerance, polyuria   Psychiatric--anxiety, depression      *-*-*-*-*-*-*-*-*-*-*-*-*-*-*-*-*-*-*-*-*-*-*-*-*-*-*-*-*-*-*-*-*-*-*-*-*-*-*-*-*-*-*-*-*-*-*-*-*-*-*-*-*-*-   VITAL SIGNS       Vitals:    05/05/23 0456 05/05/23 0509 05/05/23 0713 05/05/23 0847   BP: 131/79  133/80 140/83   BP Location:    Left arm   Patient Position:    Sitting   Cuff Size:    Standard   Pulse: 70  71    Resp:       Temp:   98 4 °F (36 9 °C)    TempSrc:   Oral    SpO2:  96% 95%    Weight:    89 4 kg (197 lb)   Height:    5' 3\" (1 6 m)         Weight    05/04/23 1800 05/05/23 0847   Weight: 89 4 kg (197 lb) 89 4 kg (197 lb)      Body mass index is 34 9 kg/m²   Wt Readings from Last 10 Encounters:   05/05/23 89 4 kg (197 lb)   05/04/23 89 4 kg (197 lb)   04/28/23 89 4 kg (197 lb 1 6 oz)   04/18/23 88 3 kg (194 lb 11 2 oz)   04/07/23 86 6 kg (191 lb)   04/07/23 86 2 kg (190 lb)   03/24/23 83 5 kg (184 lb)   03/10/23 82 1 kg (181 lb)   02/07/23 80 5 kg (177 lb 8 oz)   01/13/23 77 6 kg (171 lb)      Intake/Output Summary (Last 24 hours) at 5/5/2023 0901  Last data filed at 5/5/2023 0801  Gross per 24 hour   Intake 1360 ml   Output 928 ml   Net 432 ml        *-*-*-*-*-*-*-*-*-*-*-*-*-*-*-*-*-*-*-*-*-*-*-*-*-*-*-*-*-*-*-*-*-*-*-*-*-*-*-*-*-*-*-*-*-*-*-*-*-*-*-*-*-*-   PHYSICAL EXAMINATION:     General Appearance:    Alert, cooperative, no distress, appears stated age, slightly overweight   Head, " Eyes, ENT:    No obvious abnormality, moist mucous mebranes  Neck:   Supple, no carotid bruit or JVD   Back:     Symmetric, no curvature  Lungs:     Respirations unlabored  Clear to auscultation bilaterally,    Chest wall:    No tenderness or deformity   Heart:    Regular rate and rhythm, loud second heart sound, systolic murmur in the pulmonic region suspicious for pulmonic valve stenosis and regurgitation  Abdomen:      Abdominal examination is deferred   Extremities:    Trace lower extremity edema is noted   Skin:   Skin color, texture, turgor normal, no rashes or lesions     *-*-*-*-*-*-*-*-*-*-*-*-*-*-*-*-*-*-*-*-*-*-*-*-*-*-*-*-*-*-*-*-*-*-*-*-*-*-*-*-*-*-*-*-*-*-*-*-*-*-*-*-*-*-   LABORATORY DATA:   I have personally reviewed pertinent labs  CMP:  Results from last 7 days   Lab Units 05/05/23  0803 05/05/23  0157 05/04/23  2159   SODIUM mmol/L 131* 133* 131*   POTASSIUM mmol/L 4 6 4 0 4 6   CHLORIDE mmol/L 102 103 104   CO2 mmol/L 23 18* 19*   BUN mg/dL 12 14 14   CREATININE mg/dL 0 70 0 72 0 73   CALCIUM mg/dL 7 7* 8 5 8 9   ALK PHOS U/L 132* 143* 155*   ALT U/L 39 42 46   AST U/L 29 29 34        Results from last 7 days   Lab Units 05/05/23  0157 05/04/23  2159   MAGNESIUM mg/dL 5 4* 5 0*        Cardiac Profile:       Invalid input(s): CK, CKMBP                 Blood Gas Analysis:             CBC:   Results from last 7 days   Lab Units 05/05/23  0803 05/05/23  0157 05/04/23  2159   WBC Thousand/uL 20 25* 18 98* 15 26*   HEMOGLOBIN g/dL 11 8 13 0 12 5   HEMATOCRIT % 34 6* 38 7 36 5   PLATELETS Thousands/uL 149 153 149     PT/INR: No results found for: PT, INR, Microbiology:            *-*-*-*-*-*-*-*-*-*-*-*-*-*-*-*-*-*-*-*-*-*-*-*-*-*-*-*-*-*-*-*-*-*-*-*-*-*-*-*-*-*-*-*-*-*-*-*-*-*-*-*-*-*-  TELEMETRY, LAST ECG:  Telemetry reviewed     Not on telemetry     Results for orders placed or performed during the hospital encounter of 05/04/23   ECG 12 lead   Result Value    Ventricular Rate 58    Atrial Rate 58    SD Interval 124    QRSD Interval 82    QT Interval 474    QTC Interval 465    P Axis 36    QRS Axis 74    T Wave Axis 43    Narrative    Sinus bradycardia  Otherwise normal ECG  No previous ECGs available  Confirmed by Phil Mccarthy ([id number]) on 5/5/2023 5:18:25 AM        ECHOCARDIOGRAM 5/5/2023:  Technically difficult but adequate transthoracic echocardiogram study      1  Mildly increased left ventricular wall thickness, normal left ventricular systolic and diastolic function of the left ventricle ejection fraction is estimated as around 60%  2  Normal right ventricle size and systolic function  3  Normal left and right atrial size  4  Normal aortic valve, no arctic valve stenosis or regurgitation  5  Mitral valve is not well-visualized  No mitral valve stenosis or regurgitation noted  6  Trace tricuspid valve regurgitation  7  Very mild pulmonic valve stenosis  Mild pulmonic valve regurgitation  8  No obvious pulmonary hypertension  9  Trace, hemodynamically insignificant circumferential pericardial effusion  10  Collapsed inferior vena cava suggesting low intravascular volume      Compared to report of previous echocardiogram from 2019 my, trace pericardial effusion is new  Overall there is no significant change in her pulmonic valve stenosis or regurgitation  *-*-*-*-*-*-*-*-*-*-*-*-*-*-*-*-*-*-*-*-*-*-*-*-*-*-*-*-*-*-*-*-*-*-*-*-*-*-*-*-*-*-*-*-*-*-*-*-*-*-*-*-*-*-  IMAGING STUDIES REPORTS: Imaging studies results reviewed    No valid procedures specified  No Chest XR results available for this patient      *-*-*-*-*-*-*-*-*-*-*-*-*-*-*-*-*-*-*-*-*-*-*-*-*-*-*-*-*-*-*-*-*-*-*-*-*-*-*-*-*-*-*-*-*-*-*-*-*-*-*-*-*-*-  AVAILABLE OLD CARDIAC TESTS REPORTS:   Results for orders placed during the hospital encounter of 08/01/19    Echo complete with contrast if indicated    Narrative  [address]  [facility], [address] [place] [address]    Transthoracic Echocardiogram  2D, M-mode, Doppler, and Color Doppler    Study date:  01-Aug-2019    Patient: Eli Tony  MR number: [id number]  Account number: [de-identified]  : 2000  Age: 22 years  Gender: Female  Status: Outpatient  Location: [facility]  Height: 63 in  Weight: 154 7 lb  BP: 102/ 70 mmHg    Indications: Murmur  Diagnoses: R01 1 - Cardiac murmur, unspecified    Sonographer:  JEFFERSON Whitney  Referring Physician:  Faye Perry MD  Group:  [facility]  Interpreting Physician:  Nancy Wynn MD    SUMMARY    LEFT VENTRICLE:  Normal left ventricular systolic function, EF 70%  Normal left ventricular cavity size  Normal left ventricular wall thickness  Normal left ventricular wall motion without regional wall motion abnormalities  Normal left ventricular  diastolic function and normal left atrial pressures  TRICUSPID VALVE:  There was trace regurgitation  PULMONIC VALVE:  Mild pulmonic stenosis  Pulmonary artery velocity 2 4 m/sec with a peak gradient of 22 mmHg  Mild-to-moderate pulmonic regurgitation  HISTORY: PRIOR HISTORY: Pulmonic stenosis    PROCEDURE: The study was performed in the [facility]  This was a routine study  The transthoracic approach was used  The study included complete 2D imaging, M-mode, complete spectral Doppler, and color Doppler  The heart rate was 70  bpm, at the start of the study  Images were obtained from the parasternal, apical, subcostal, and suprasternal notch acoustic windows  Image quality was adequate  LEFT VENTRICLE: Normal left ventricular systolic function, EF 54%  Normal left ventricular cavity size  Normal left ventricular wall thickness  Normal left ventricular wall motion without regional wall motion abnormalities  Normal left  ventricular diastolic function and normal left atrial pressures      RIGHT VENTRICLE: The size was normal  Systolic function was normal  Wall thickness was normal     LEFT ATRIUM: Size was normal     RIGHT ATRIUM: Size was normal     MITRAL VALVE: Valve structure was normal  There was normal leaflet separation  DOPPLER: The transmitral velocity was within the normal range  There was no evidence for stenosis  There was no regurgitation  AORTIC VALVE: The valve was trileaflet  Leaflets exhibited normal thickness and normal cuspal separation  DOPPLER: Transaortic velocity was within the normal range  There was no evidence for stenosis  There was no regurgitation  TRICUSPID VALVE: The valve structure was normal  There was normal leaflet separation  DOPPLER: The transtricuspid velocity was within the normal range  There was no evidence for stenosis  There was trace regurgitation  PULMONIC VALVE: Mild pulmonic stenosis  Pulmonary artery velocity 2 4 m/sec with a peak gradient of 22 mmHg  Mild-to-moderate pulmonic regurgitation  PERICARDIUM: There was no pericardial effusion  The pericardium was normal in appearance  AORTA: The root exhibited normal size  PULMONARY ARTERY: The size was normal  DOPPLER: Systolic pressure was within the normal range      SYSTEM MEASUREMENT TABLES    2D  %FS: 39 59 %  Ao Diam: 2 37 cm  EDV(Teich): 95 31 ml  EF(Teich): 70 23 %  ESV(Teich): 28 37 ml  IVSd: 0 94 cm  LA Area: 14 3 cm2  LA Diam: 3 28 cm  LVEDV MOD A4C: 78 58 ml  LVEF MOD A4C: 64 41 %  LVESV MOD A4C: 27 96 ml  LVIDd: 4 56 cm  LVIDs: 2 75 cm  LVLd A4C: 9 35 cm  LVLs A4C: 6 83 cm  LVPWd: 0 88 cm  RA Area: 11 46 cm2  RVIDd: 2 86 cm  SV MOD A4C: 50 61 ml  SV(Teich): 66 94 ml    CW  PV Vmax: 2 36 m/s  PV maxP 4 mmHg  TR Vmax: 2 36 m/s  TR maxP 35 mmHg    MM  TAPSE: 2 39 cm    PW  E': 0 15 m/s  E/E': 5 7  MV A Himanshu: 0 61 m/s  MV Dec Gentry: 2 84 m/s2  MV DecT: 303 06 ms  MV E Himanshu: 0 86 m/s  MV E/A Ratio: 1 4  MV PHT: 87 89 ms  MVA By PHT: 2 5 cm2    [facility] Accredited Echocardi[facility]    Prepared and electronically signed by    Zafar Anderson MD  Signed 02-Aug-2019 10:44:42    No results found for this or any previous visit  No results found for this or any previous visit  No results found for this or any previous visit           *-*-*-*-*-*-*-*-*-*-*-*-*-*-*-*-*-*-*-*-*-*-*-*-*-*-*-*-*-*-*-*-*-*-*-*-*-*-*-*-*-*-*-*-*-*-*-*-*-*-*-*-*-*-  SIGNATURES:   @GPK@   [facility] MD Lavon     CC:   Nick Lee PA-C   [facility]

## 2023-05-05 NOTE — UTILIZATION REVIEW
"NOTIFICATION OF INPATIENT ADMISSION   MATERNITY/DELIVERY AUTHORIZATION REQUEST   SERVICING FACILITY:   [address] [place] [address] - L&D, [place], NICU  [address] [place] [address], [place] [address] [facility]  Tax ID: [id number]  NPI: [id number] ATTENDING PROVIDER:  Attending Name and NPI#: Leah Lange Md [[id number]]  Address: [address] [place] [address] [facility]  Phone: [phone]     ADMISSION INFORMATION:  Place of Service: Inpatient [address] [facility]  60[address]  Place of Service Code: 21  Inpatient Admission Date/Time: 23  5:43 PM  Discharge Date/Time: No discharge date for patient encounter  Admitting Diagnosis Code/Description:  Hypertension affecting pregnancy in third trimester [O16 3]     Mother: Zohreh Lisa 2000 Estimated Date of Delivery: 23  Delivering clinician: Leah Lange    OB History        1    Para   1    Term   0       1    AB   0    Living   1       SAB   0    IAB   0    Ectopic   0    Multiple   0    Live Births   1                Name & MRN:   Information for the patient's :  Radha Fisher Girl Thai Arthur) [[id number]]      Delivery Information:  Sex: female  Delivered 2023 10:34 PM by , Low Transverse; Gestational Age: 37w2d     Measurements:  Weight: 5 lb 5 4 oz (2420 g); Height: 19\"    APGAR 1 minute 5 minutes 10 minutes   Totals: 8 9      [facility] Birth Information: 20 y o  female MRN: [id number] Unit/Bed#: L&D 326-01   Birthweight: No birth weight on file  Gestational Age: <None> Delivery Type:    APGARS Totals:        UTILIZATION REVIEW CONTACT:  Leah Multani Utilization   Network Utilization Review Department  Phone: [phone]  Fax [phone]  Email: [email or web address]  Contact for approvals/pending authorizations, clinical reviews, and discharge       PHYSICIAN ADVISORY SERVICES:  Medical Necessity Denial & [place] Review  Phone: [phone]  " Fax: [phone]  Email: [email or web address]

## 2023-05-05 NOTE — LACTATION NOTE
This note was copied from a baby's chart  CONSULT - LACTATION  Baby Girl Amilcar Gold Jonelle 1 days female MRN: [id number]    [facility] Room / Bed: L&D 326(N)/L&D 326(N) Encounter: [id number]    Maternal Information     MOTHER:  Jessa Sal  Maternal Age: 20 y o    OB History: # 1 - Date: 23, Sex: Female, Weight: 2420 g (5 lb 5 4 oz), GA: 36w4d, Delivery: , Low Transverse, Apgar1: 8, Apgar5: 9, Living: Living, Birth Comments: None   Previouse breast reduction surgery? No    Lactation history:   Has patient previously breast fed: No   How long had patient previously breast fed:     Previous breast feeding complications:       Past Surgical History:   Procedure Laterality Date   • TONSILLECTOMY          Birth information:  YOB: 2024   Time of birth: 10:34 PM   Sex: female   Delivery type: , Low Transverse   Birth Weight: 2420 g (5 lb 5 4 oz)   Percent of Weight Change: 0%     Gestational Age: 37w2d   [unfilled]    Assessment     Breast and nipple assessment: normal assessment     Assessment: sleepy    Feeding assessment: latch difficulty (working on consistent deep latch)     LATCH:  Latch: Too sleepy or reluctant, no latch achieved   Audible Swallowing: A few with stimulation (mostly from hand expression)   Type of Nipple: Everted (After stimulation)   Comfort (Breast/Nipple): Soft/non-tender   Hold (Positioning): Partial assist, teach one side, mother does other, staff holds   [facility]          Feeding recommendations:  breast feed on demand     Met with mother, father & Family  Provided  with Ready, Set, Baby booklet which contained information on:  Hand expression with access to QR codes to review hand expression  Encouraged to express 0 4ml prior to each feed while on blood sugar protocol and prior to each latch attempt  Positioning and latch reviewed as well as showing images of other feeding positions  Discussed the properties of a good latch in any position  Mom chose right breast  Encouraged football hold to help teach deep latch  Baby guided to latch & stimulated to suck for only a few piston sucks  Multiple attempts till baby asleep at breast    Feeding on cue and what that means for recognizing infant's hunger, s/s that baby is getting enough milk and some s/s that breastfeeding dyad may need further help    Skin to Skin contact an benefits to mom and baby  Avoidance of pacifiers for the first month discussed  Discussed risks for early supplementation: over feeding, longer digestion times, engorgement for mom, lower milk supply for mom, and nipple confusion  Benefits of breast feeding for infant's intestinal tract, less engorgement for mom, protection from multiple disease processes as infant develops, avoidance of over feeding for infant, less nipple confusion, and increased health benefits for mom  Gave information on common concerns, what to expect the first few weeks after delivery, preparing for other caregivers, and how partners can help  Resources for support also provided  Also reviewed  discharge breastfeeding booklet including the feeding log  Emphasized 8 or more (12) feedings in a 24 hour period, what to expect for the number of diapers per day of life and the progression of properties of the  stooling pattern  Reviewed breastfeeding and your lifestyle, storage and preparation of breast milk, how to keep you breast pump clean, the employed breastfeeding mother and paced bottle feeding handouts  Booklet included Breastfeeding Resources for after discharge including access to the number for the [address]  [employer] [facility]  to call for assistance, questions and concerns  Extension number for inpatient lactation support provided                    Melinda Olivera RN 2023 10:29 AM

## 2023-05-05 NOTE — PLAN OF CARE

## 2023-05-05 NOTE — OP NOTE
OPERATIVE REPORT  PATIENT NAME: Lizette Balderas    :  2000  MRN: [id number]  Pt Location: [facility] L&D OR ROOM 01    SURGERY DATE: 2023    Surgeon(s) and Role:     * Ishan Higuera MD - Primary     * Helga Fry MD - Assisting    Preop Diagnosis:  Pregnancy at 39 weeks of gestation  Congenital pulmonary stenosis  Anemia during pregnancy  Thrombocytopenia during pregnancy  Preeclampsia with severe features  GBS positive status    Procedure(s) (LRB):   SECTION () (N/A)    Specimen(s):  ID Type Source Tests Collected by Time Destination   1 :  Tissue (Placenta on Hold) OB Only Placenta TISSUE EXAM OB (PLACENTA) ONLY Ishan Higuera MD 2023    A :  Cord Blood Cord BLOOD GAS, VENOUS, CORD Ishan Higuera MD 2023    B :  Cord Blood Cord BLOOD GAS, ARTERIAL, CORD Ishan Higuera MD 2023        Surgical QBL:  Surgical QBL (mL): 133 mL      IV fluids:    mL     Drains:  Cartwright - 20 mL    Anesthesia Type:   Epidural    Operative Indications:  Pregnancy at 36 weeks of gestation   Preeclampsia with severe features, remote from delivery      [employer] Classification System:  No Multiple pregnancy, No Transverse or oblique lie, No Breech lie, Gestational age is < 37 weeks +  is ACACIA GROUP 10    Complications:   None Apparent     Operative Findings:  1  Viable female  at 2234 with APGARs of 8 and 9 at 1 and 5 minutes  Fetus weighted 5lb 5 4oz   2  Normal intact placenta with centrally inserted 3VC  3  Normal uterus, bilateral tubes and ovaries      Umbilical Cord Venous Blood Gas:  Results from last 7 days   Lab Units 23   PH COV  7 194   PCO2 COV mm HG 52 0*   HCO3 COV mmol/L 19 6   BASE EXC COV mmol/L -9 0*   O2 CT CD VB mL/dL 3 5   O2 HGB, VENOUS CORD % 95 6     Umbilical Cord Arterial Blood Gas:  Results from last 7 days   Lab Units 23   PH COA  7 144*   PCO2 COA  59 9   PO2 COA mm HG <10 0 HCO3 COA mmol/L 20 1   BASE EXC COA mmol/L -9 7*   O2 CONTENT CORD ART ml/dl 0 0   O2 HGB, ARTERIAL CORD % 3 4     Brief History:   Henry Esquivel is a 20 y o  Aravind Hora who was admitted at 36w4d who was initially sent over from office for observation in the setting of elevated blood pressures and concern for evolving preeclampsia past medical history significant for congenital pulmonary stenosis  Shortly after arrival to labor and delivery she was noted to have sustained severe range blood pressures meeting criteria for preeclampsia with severe features  She received 1 dose of betamethasone for fetal lung maturity  She was started on a magnesium fusion for seizure prophylaxis and was acutely treated for her blood pressures  She received labetalol 20/40/40/80 mg and Hydralazine 5/5/10 mg as well as was initiated Procardia 30 mg XL for management of her severe range blood pressures      A multidisciplinary huddle was held to discuss further management considering remote from delivery (cervical exam on arrival 3/80/-1 and nulliparous status) and blood pressure is now refractory to multiple antihypertensive agents  Decision was made to proceed with primary low-transverse  section in the setting of preeclampsia with severe features with blood pressures refractory to antihypertensive treatment, remote from delivery  Procedure and Technique:  The patient was taken to the operating room  Epidural anesthesia was adequately established and 2 grams of ancef were given for preoperative prophylaxis  The patient was then placed in the dorsal supine position with a left tilt of the hips  The patient was then prepped with chlorhexidine for vaginal prep  Prior to abdominal prep being performed, fetal heart tones were noted to be in the 70s  The decision was made to proceed in a STAT fashion  Betadine was splashed on the abdomen and a time out was quickly performed to confirm correct patient and correct procedure  A Pfannenstiel incision was made and carried down through the underlying subcutaneous tissue to the fascia using a scalpel  Rectus fascia was then incised  We then proceeded in Leland-Baer fashion  All anatomic layers were well-demarcated  The rectus muscles were  and the peritoneum was identified, entered, and extended longitudinally with blunt dissection  The abdomen was noted to be very tight and there was noted to be suboptimal space for fetal extraction  The peritoneum was incised and the right rectus muscle was partially dissected using bovie electrocautery  The bladder blade was inserted and a transverse incision was made in the lower uterine segment using a new surgical blade  The uterine incision was extended cephalad and caudal using blunt dissection  The amniotic sac was entered and the amniotic fluid was noted to be clear   The surgeon's hand was placed into the uterine cavity  The fetal head was identified and elevated to the level of the hysterotomy  There was difficulty with delivery of the fetal head and a kiwi vacuum place placed on the fetal head  The fetal head was delivered through the uterine incision with the assistance of fundal pressure and traction from the vacuum  With gentle traction, the shoulder was delivered, followed by the rest of the fetal body  There was no nuchal cord noted  On delivery the cord was doubly clamped and cut after delayed cord clamping  The infant was then passed off the table to the awaiting  staff  The  was noted to cry spontaneously and moved all extremities  Venous and arterial blood gas, cord blood, and portion of cord was obtained for analysis and routine blood testing  The placenta delivery was then sent to storage  Placenta was noted to be intact with a centrally inserted three-vessel cord  Oxytocin was administered by IV infusion to enhance uterine contraction   The uterus was exteriorized and cleared of all clots and remaining products of conception  The uterine incision was re approximated using a 0- vicryl in a running locked fashion  A second horizontal imbricating stitch with 0-vicryl was applied  The uterine incision was examined and noted to be hemostatic  The posterior cul-de-sac was cleared of all clots and products of conception  The uterus was replaced into the abdomen and the pericolic gutters were cleared of all clots  The uterine incision was once again reexamined and noted to be hemostatic  The fascia was re approximated using 0-vicryl in a running nonlocked fashion  The subcutaneous tissue was irrigated and cleared of all clots and debris  Good hemostasis was noted with Bovie electrocautery  The subcutaneous tissue was reapproximated with 3-0 plain  The skin incision was closed using 4-0 monocryl and covered with exofin skin adhesive  Good hemostasis was noted  Patient tolerated the procedure well  All needle, sponge, and instrument counts were noted to be correct x 2 at the end of the procedure  Patient was transferred to the recovery room in stable condition  Dr Ivelisse Rutherford was present for the procedure        Patient Disposition:  Patient and infant recovering in labor and delivery rate         SIGNATURE: Helga Fry MD  DATE: May 5, 2023  TIME: 5:53 AM

## 2023-05-05 NOTE — DISCHARGE SUMMARY
CS Discharge Summary - Naomi Escamilla 20 y o  female MRN: [id number]    Unit/Bed#: L&D 326-01 Encounter: [id number]    Admission Date: 2023     Discharge Date: 2023     Admitting Diagnosis:   Pregnancy at 39 weeks of gestation  Congenital pulmonary stenosis  Anemia during pregnancy  Thrombocytopenia during pregnancy  Preeclampsia with severe features  GBS positive status     Discharge Diagnosis:   Same, delivered  S/p primary low transverse  section     Procedures:   primary  section, low transverse incision    Admitting Attending: Dr Aaron Jacobo  Delivery Attending: Dr Aaron Jacobo  Discharge Attending: Dr Remy Locke service: Cardiology     Hospital Course:     Naomi Escamilla is a 20 y o  Tarah Mitchell who was admitted at 36w4d who was initially sent over from office for observation in the setting of elevated blood pressures and concern for evolving preeclampsia past medical history significant for congenital pulmonary stenosis  Shortly after arrival to labor and delivery she was noted to have sustained severe range blood pressures meeting criteria for preeclampsia with severe features  She received 1 dose of betamethasone for fetal lung maturity  She was started on a magnesium fusion for seizure prophylaxis and was acutely treated for her blood pressures  She received labetalol 20/40/40/80 mg and Hydralazine 5/5/10 mg as well as was initiated Procardia 30 mg XL for management of her severe range blood pressures  A multidisciplinary huddle was held to discuss further management considering remote from delivery (cervical exam on arrival 3/80/ and nulliparous status) and blood pressure is now refractory to multiple antihypertensive agents  Decision was made to proceed with primary low-transverse  section in the setting of preeclampsia with severe features with blood pressures refractory to antihypertensive treatment, remote from delivery      Upon arrival to the operating room, fetal heart tones were noted to be in the 70s  The decision was made to proceed in a STAT fashion  She then underwent an uncomplicated  section delivery and delivered a viable female  at 2234  APGARS were 8, 9 at 1 and 5 minutes, respectively   weighed 5lb 5 4oz  Placenta was delivered at 2236   was then transferred to  nursery  Patient tolerated the procedure well and was transferred to recovery in stable condition  She had blood loss anemia from the  section, with hemoglobin 12 5 preoperatively, and a wilmer of 9 7 postoperatively  She received venofer  Her postoperative pain was well controlled with oral analgesics  Cardiology was consulted postoperatively for congenital pulmonary stenosis and will follow up outpatient  Her AST and ALT elan postpartum and were trended, but recovered without intervention  Peak was 233/389 on 23  Platelets, hemoglobin, and creatinine otherwise remained stable  She continued to required titration of her antihypertensive medications during the postpartum period and received acute IV treatment multiple times  Her discharge regimen was Procardia 120mg XL daily, labetalol 100mg BID, and hydralazine 25mg TID  On day of discharge, she was ambulating and able to reasonably perform all ADLs  She was voiding and had appropriate bowel function  Pain was well controlled  She was discharged home on post-operative day #5 without complications  Patient was instructed to follow up with her OB as an outpatient in 1 day for blood pressure check and was given appropriate warnings to call provider if she develops signs of infection or uncontrolled pain  She is breast feeding   Mom's blood type is O positive RhoGAM is not indicated  Complications:   None    Condition at discharge:   good     Provisions for Follow-Up Care:  See after visit summary for information related to follow-up care and any pertinent home health orders  Disposition:   Home    Planned Readmission:   No    Discharge Medications:   Please see AVS for full medications    Discharge instructions :   -Do not place anything (no partner, tampons or douche) in your vagina for 6 weeks  -You may walk for exercise for the first 6 weeks then gradually return to your usual activities    -Please do not drive for 1 week if you have no stitches and for 2 weeks if you have stitches or underwent a  delivery     -You may take baths or shower per your preference    -Please look at your bust (breasts) in the mirror daily and call provider for redness or tenderness or increased warmth  - If you have had a  please look at your incision daily as well and call provider for increasing redness or steady drainage from the incision    -Please call your provider if temperature > 100 4*F or 38* C, worsening pain or a foul discharge

## 2023-05-05 NOTE — PROGRESS NOTES
Multi-disciplinary Huddle:     Participants:   Marcelene Brittle, MD - MD Jeffry Austin, DO - Anesthesia   YESENIA Ozuna-DOMENICA - ICU    Brief History: Thai Hugo is a 19 yo  at 36w4d who was initially sent over from office for observation in the setting of elevated blood pressures and concern for evolving preeclampsia past medical history significant for congenital pulmonary stenosis  She is now status post 1 dose of betamethasone for fetal lung maturity  Shortly after arrival to labor and delivery she was noted to have sustained severe range blood pressures meeting criteria for preeclampsia with severe features  She was started on a magnesium fusion for seizure prophylaxis and was acutely treated for her blood pressures  She is now status post  labetalol 20/40/40/80 mg and Hydralazine 5/5/10 mg as well as been initiated Procardia 30 mg XL  Most recent blood pressure was noted to be 151/93  The team discussed possible options for management including antihypertensive drip in the ICU with continued induction of labor versus delivery via  section with recovery on labor and delivery and then movement to the ICU if needed or  section followed by transfer to the ICU  There are multiple components of Jessa's care discussed including concern for fluid shifts following delivery and the fact that this may have on cardiac function in the setting of known pulmonary stenosis  Given that Gloria Peraza is asymptomatic at this time but remains remote from delivery the plan will be to proceed with primary low-transverse  section and to evaluate blood pressures immediately postpartum  If blood pressures are noted to be persistently elevated and severe following delivery the plan will be to transition to ICU for continued recovery and blood pressure management  If her blood pressures are noted to remain stable, she will recover on labor and delivery      We will plan to proceed to the operating room for primary low-transverse  section in the setting of preeclampsia with severe features refractory to antihypertensive treatment, remote from delivery  We will administer 2 g of Ancef for surgical prophylaxis  The OR team notified  David Cabrera was counseled on the risks of  section including bleeding, injury to surrounding structures including bowel,  bladder and major blood vessels and infection    She is aware of and understands the possibility of the need to transfer her to a higher level of care following delivery should blood pressures persist      Vitals:    23   BP: (!) 169/103 (!) 173/101 146/96 160/94   Pulse: 65 69 73 67   Resp:       Temp:       TempSrc:       Weight:       Height:             Daniel Alarcon MD  Obstetrics & Gynecology PGY-2  2023  9:25 PM

## 2023-05-05 NOTE — ANESTHESIA POSTPROCEDURE EVALUATION
Post-Op Assessment Note    CV Status:  Stable    Pain management: adequate     Mental Status:  Alert and awake   Hydration Status:  Euvolemic   PONV Controlled:  Controlled   Airway Patency:  Patent      Post Op Vitals Reviewed: Yes      Staff: Anesthesiologist     Post-op block assessment: catheter intact and no complications      No notable events documented      /76 (05/05/23 0000)    Temp 97 6 °F (36 4 °C) (05/05/23 0000)    Pulse 72 (05/05/23 0000)   Resp 16 (05/05/23 0000)    SpO2

## 2023-05-05 NOTE — ANESTHESIA PROCEDURE NOTES
Epidural Block    Patient location during procedure: OB  Start time: 5/4/2023 9:52 PM  Reason for block: at surgeon's request and primary anesthetic  Staffing  Performed: Anesthesiologist   Anesthesiologist: Yun Norwood DO  Preanesthetic Checklist  Completed: patient identified, IV checked, risks and benefits discussed, surgical consent, monitors and equipment checked, pre-op evaluation and timeout performed  Epidural  Patient position: sitting  Prep: Betadine  Patient monitoring: frequent blood pressure checks and continuous pulse ox  Approach: midline  Location: lumbar  Injection technique: ADAN saline  Needle  Needle type: Tuohy   Needle gauge: 18 G  Catheter type: none  Catheter size: 20 G  Catheter at skin depth: 11 cm  Catheter securement method: clear occlusive dressing  Test dose: negative  Assessment  Number of attempts: 1negative aspiration for CSF, negative aspiration for heme and no paresthesia on injection  patient tolerated the procedure well with no immediate complications  Additional Notes  Only test dose given  Pt for the OR soon and we'll bolus on the way back

## 2023-05-06 LAB
ALBUMIN SERPL BCP-MCNC: 3 G/DL (ref 3.5–5)
ALP SERPL-CCNC: 102 U/L (ref 34–104)
ALT SERPL W P-5'-P-CCNC: 35 U/L (ref 7–52)
ANION GAP SERPL CALCULATED.3IONS-SCNC: 5 MMOL/L (ref 4–13)
AST SERPL W P-5'-P-CCNC: 24 U/L (ref 13–39)
BILIRUB SERPL-MCNC: 0.2 MG/DL (ref 0.2–1)
BUN SERPL-MCNC: 18 MG/DL (ref 5–25)
CALCIUM ALBUM COR SERPL-MCNC: 7.8 MG/DL (ref 8.3–10.1)
CALCIUM SERPL-MCNC: 7 MG/DL (ref 8.4–10.2)
CHLORIDE SERPL-SCNC: 105 MMOL/L (ref 96–108)
CO2 SERPL-SCNC: 25 MMOL/L (ref 21–32)
CREAT SERPL-MCNC: 0.63 MG/DL (ref 0.6–1.3)
ERYTHROCYTE [DISTWIDTH] IN BLOOD BY AUTOMATED COUNT: 13.2 % (ref 11.6–15.1)
GFR SERPL CREATININE-BSD FRML MDRD: 126 ML/MIN/1.73SQ M
GLUCOSE SERPL-MCNC: 82 MG/DL (ref 65–140)
HCT VFR BLD AUTO: 28.6 % (ref 34.8–46.1)
HGB BLD-MCNC: 9.7 G/DL (ref 11.5–15.4)
MCH RBC QN AUTO: 32.3 PG (ref 26.8–34.3)
MCHC RBC AUTO-ENTMCNC: 33.9 G/DL (ref 31.4–37.4)
MCV RBC AUTO: 95 FL (ref 82–98)
PLATELET # BLD AUTO: 135 THOUSANDS/UL (ref 149–390)
PMV BLD AUTO: 12.7 FL (ref 8.9–12.7)
POTASSIUM SERPL-SCNC: 4.3 MMOL/L (ref 3.5–5.3)
PROT SERPL-MCNC: 5.7 G/DL (ref 6.4–8.4)
RBC # BLD AUTO: 3 MILLION/UL (ref 3.81–5.12)
SODIUM SERPL-SCNC: 135 MMOL/L (ref 135–147)
WBC # BLD AUTO: 15.9 THOUSAND/UL (ref 4.31–10.16)

## 2023-05-06 RX ORDER — IBUPROFEN 600 MG/1
600 TABLET ORAL EVERY 6 HOURS SCHEDULED
Status: DISCONTINUED | OUTPATIENT
Start: 2023-05-06 | End: 2023-05-09 | Stop reason: HOSPADM

## 2023-05-06 RX ADMIN — ACETAMINOPHEN 325MG 650 MG: 325 TABLET ORAL at 18:19

## 2023-05-06 RX ADMIN — ACETAMINOPHEN 325MG 650 MG: 325 TABLET ORAL at 05:59

## 2023-05-06 RX ADMIN — ACETAMINOPHEN 325MG 650 MG: 325 TABLET ORAL at 12:19

## 2023-05-06 RX ADMIN — DOCUSATE SODIUM 100 MG: 100 CAPSULE, LIQUID FILLED ORAL at 18:18

## 2023-05-06 RX ADMIN — ENOXAPARIN SODIUM 40 MG: 100 INJECTION SUBCUTANEOUS at 08:20

## 2023-05-06 RX ADMIN — IRON SUCROSE 200 MG: 20 INJECTION, SOLUTION INTRAVENOUS at 15:14

## 2023-05-06 RX ADMIN — DOCUSATE SODIUM 100 MG: 100 CAPSULE, LIQUID FILLED ORAL at 08:20

## 2023-05-06 RX ADMIN — IBUPROFEN 600 MG: 600 TABLET, FILM COATED ORAL at 16:22

## 2023-05-06 RX ADMIN — IBUPROFEN 600 MG: 600 TABLET, FILM COATED ORAL at 22:08

## 2023-05-06 RX ADMIN — KETOROLAC TROMETHAMINE 15 MG: 30 INJECTION, SOLUTION INTRAMUSCULAR; INTRAVENOUS at 02:24

## 2023-05-06 RX ADMIN — NIFEDIPINE 30 MG: 30 TABLET, FILM COATED, EXTENDED RELEASE ORAL at 08:20

## 2023-05-06 NOTE — PROGRESS NOTES
CARDIOLOGY INPATIENT FOLLOW-UP PROGRESS NOTE  *-*-*-*-*-*-*-*-*-*-*-*-*-*-*-*-*-*-*-*-*-*-*-*-*-*-*-*-*-*-*-*-*-*-*-*-*-*-*-*-*-*-*-*-*-*-*-*-*-*-*-*-*-*-  [id number] DATE: 23 9:43 AM   AUTHOR: Robbi Jesus MD  PATIENT: Juana Diaz   2000    [id number]   89 y o    female  INPATIENT HOSPITALIST PHYSICIAN: Oralia Gabriel  DATE OF ADMISSION: 2023  5:10 PM; LENGTH OF STAY: 2 days  *-*-*-*-*-*-*-*-*-*-*-*-*-*-*-*-*-*-*-*-*-*-*-*-*-*-*-*-*-*-*-*-*-*-*-*-*-*-*-*-*-*-*-*-*-*-*-*-*-*-*-*-*-*-    CARDIOLOGY ASSESSMENT:  1  Preeclampsia with hypertension, without symptoms  ,  Minimal proteinuria  2  POD 2 s/p low transverse  section  3  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, Lenore Sequeira MD, 650 mg at 23 0559  •  benzocaine-menthol-lanolin-aloe (DERMOPLAST) 20-0 5 % topical spray 1 application  , 1 application  , Topical, Q6H PRN, Lenore Sequeira MD  •  calcium carbonate (TUMS) chewable tablet 1,000 mg, 1,000 mg, Oral, Daily PRN, Lenore Sequeira MD  •  diphenhydrAMINE (BENADRYL) tablet 25 mg, 25 mg, Oral, Q6H PRN, Lenore Sequeira MD  •  docusate sodium (COLACE) capsule 100 mg, 100 mg, Oral, BID, Lenore Sequeira MD, 100 mg at 23 0820  •  enoxaparin (LOVENOX) subcutaneous injection 40 mg, 40 mg, Subcutaneous, Q24H Eva Roberto MD, 40 mg at 23 0820  •  hydrocortisone 1 % cream 1 application  , 1 application  , Topical, Daily PRN, Lenore Sequeira MD  •  [] ketorolac (TORADOL) injection 15 mg, 15 mg, Intravenous, Q6H PRN, 15 mg at 05/06/23 0224 **FOLLOWED BY** ibuprofen (MOTRIN) tablet 600 mg, 600 mg, Oral, Q6H PRN, Andres Whyte MD  •  NIFEdipine (PROCARDIA XL) 24 hr tablet 30 mg, 30 mg, Oral, Daily, Andres Whyte MD, 30 mg at 05/06/23 0820  •  ondansetron (ZOFRAN) injection 4 mg, 4 mg, Intravenous, Q8H PRN, Andres Whyte MD  •  oxyCODONE (ROXICODONE) IR tablet 5 mg, 5 mg, Oral, Q4H PRN **OR** oxyCODONE (ROXICODONE) IR tablet 10 mg, 10 mg, Oral, Q4H PRN, Andres Whyte MD  •  [facility]) chewable tablet 80 mg, 80 mg, Oral, 4x Daily PRN, Andres Whyte MD  •  sod citrate-citric acid (BICITRA) oral solution 30 mL, 30 mL, Oral, 4x Daily PRN, Andres Whyte MD  •  witch hazel-glycerin (TUCKS) topical pad 1 pad , 1 pad , Topical, Q4H PRN, Andres Whyte MD    PERTINENT LABS AND OTHER INVESTIGATIONS: Sodium 135 potassium 4 3 chloride 105 bicarb 25 BUN 18 creatinine 0 63 corrected calcium 7 8 hemoglobin 9 7 hematocrit 28 6 platelet count 454    ECHOCARDIOGRAM AND OTHER CARDIAC TESTS RESULTS:   Echocardiogram 5/5/2023  · Mildly increased left ventricular wall thickness, normal left ventricular systolic and diastolic function of the left ventricle ejection fraction is estimated as around 60%  · Normal right ventricle size and systolic function  · Normal left and right atrial size  · Normal aortic valve, no arctic valve stenosis or regurgitation  · Mitral valve is not well-visualized  No mitral valve stenosis or regurgitation noted  · Trace tricuspid valve regurgitation  · Very mild pulmonic valve stenosis  Mild pulmonic valve regurgitation  · No obvious pulmonary hypertension  · Trace, hemodynamically insignificant circumferential pericardial effusion  · Collapsed inferior vena cava suggesting low intravascular volume        *-*-*-*-*-*-*-*-*-*-*-*-*-*-*-*-*-*-*-*-*-*-*-*-*-*-*-*-*-*-*-*-*-*-*-*-*-*-*-*-*-*-*-*-*-*-*-*-*-*-*-*-*-*-  Patient doing well with no symptoms of preeclampsia and well-controlled blood pressure  Has trace pedal edema on exam which is related to her pregnancy and does not reflect side effect of Procardia XL  PLAN:  --Advised continued current antihypertensive medication  -- Patient may be discharged home from cardiac perspective  We will arrange for cardiology office follow-up in 3 to 4 weeks  -- Continue routine post  care  Advised close follow-up with OB/GYN clinic with continued screening for side effect from medication and for symptoms of heart failure  Other recommendations as provided in yesterday's note  *-*-*-*-*-*-*-*-*-*-*-*-*-*-*-*-*-*-*-*-*-*-*-*-*-*-*-*-*-*-*-*-*-*-*-*-*-*-*-*-*-*-*-*-*-*-*-*-*-*-*-*-*-*-  INTERVAL CHANGES / HISTORY OF PRESENT ILLNESS:   No acute events  Patient denies chest pain shortness of breath dizziness or palpitations  Denies headache or visual symptoms    *-*-*-*-*-*-*-*-*-*-*-*-*-*-*-*-*-*-*-*-*-*-*-*-*-*-*-*-*-*-*-*-*-*-*-*-*-*-*-*-*-*-*-*-*-*-*-*-*-*-*-*-*-*    PERTINENT REVIEW OF SYMPTOMS: As noted above in HPI    *-*-*-*-*-*-*-*-*-*-*-*-*-*-*-*-*-*-*-*-*-*-*-*-*-*-*-*-*-*-*-*-*-*-*-*-*-*-*-*-*-*-*-*-*-*-*-*-*-*-*-*-*-*-  VITAL SIGNS:  Vitals:    23 2200 23 2307 23 0300 23 0749   BP:  129/75 138/74 136/80   BP Location:   Left arm Left arm   Patient Position:       Cuff Size:       Pulse:  75 72 72   Resp:  18 18 16   Temp:  98 7 °F (37 1 °C) 98 6 °F (37 °C) 98 3 °F (36 8 °C)   TempSrc:  Oral Oral Oral   SpO2: 94%      Weight:       Height:          Temp (24hrs), Av 3 °F (36 8 °C), Min:97 7 °F (36 5 °C), Max:98 7 °F (37 1 °C)  Current: Temperature: 98 3 °F (36 8 °C)      Weight    23 1800 23 0847   Weight: 89 4 kg (197 lb) 89 4 kg (197 lb)      Body mass index is 34 9 kg/m²  Body surface area is 1 92 meters squared   Wt Readings from Last 3 Encounters:   23 89 4 kg (197 lb)   23 89 4 kg (197 lb)   23 89 4 kg (197 lb 1 6 oz)      Intake/Output Summary (Last 24 hours) at 5/6/2023 0943  Last data filed at 5/6/2023 0201  Gross per 24 hour   Intake 480 ml   Output 1450 ml   Net -970 ml          *-*-*-*-*-*-*-*-*-*-*-*-*-*-*-*-*-*-*-*-*-*-*-*-*-*-*-*-*-*-*-*-*-*-*-*-*-*-*-*-*-*-*-*-*-*-*-*-*-*-*-*-*-*-  PHYSICAL EXAM:  General Appearance:    Alert, cooperative, in no respiratory distress, appears stated age, slightly overweight   Head, Eyes, ENT:    No obvious abnormality, moist mucous mebranes  Neck:   Supple, no carotid bruit or JVD   Back:     Symmetric, no curvature  Lungs:     Respirations unlabored  Clear to auscultation bilaterally,    Chest wall:    No tenderness or deformity   Heart:    Regular rate and rhythm, S1 and S2 normal, no murmur, rub  or gallop  Abdomen:      Abdomen not examined   Extremities:   Extremities warm, trace lower extremity edema, improved compared to yesterday   Skin:   Skin color, texture, turgor normal, no rashes or lesions     *-*-*-*-*-*-*-*-*-*-*-*-*-*-*-*-*-*-*-*-*-*-*-*-*-*-*-*-*-*-*-*-*-*-*-*-*-*-*-*-*-*-*-*-*-*-*-*-*-*-*-*-*-*-  TELEMETRY, LAST ECG:  Telemetry reviewed    Not on telemetry     Results for orders placed or performed during the hospital encounter of 05/04/23   ECG 12 lead   Result Value    Ventricular Rate 58    Atrial Rate 58    CO Interval 124    QRSD Interval 82    QT Interval 474    QTC Interval 465    P Axis 36    QRS Axis 74    T Wave Axis 43    Narrative    Sinus bradycardia  Otherwise normal ECG  No previous ECGs available  Confirmed by Manfred Kitchen ([id number]) on 5/5/2023 5:18:25 AM        *-*-*-*-*-*-*-*-*-*-*-*-*-*-*-*-*-*-*-*-*-*-*-*-*-*-*-*-*-*-*-*-*-*-*-*-*-*-*-*-*-*-*-*-*-*-*-*-*-*-*-*-*-*  LABORATORY DATA:  I have personally reviewed pertinent labs      CMP:  Results from last 7 days   Lab Units 05/06/23  0756 05/05/23 2014 05/05/23  0803   SODIUM mmol/L 135 134* 131*   POTASSIUM mmol/L 4 3 4 5 4 6   CHLORIDE mmol/L 105 103 102   CO2 mmol/L 25 25 23   BUN mg/dL 18 17 12   CREATININE mg/dL 0 63 0 76 0 70   CALCIUM mg/dL 7 0* 7 3* 7 7*   ALK PHOS U/L 102 119* 132*   ALT U/L 35 40 39   AST U/L 24 30 29        Results from last 7 days   Lab Units 23  0803 23  0157   MAGNESIUM mg/dL 6 3* 5 9* 5 4*        Cardiac Profile:       Invalid input(s): CK, CKMBP                 Arterial Blood Gas Analysis:    Venous Blood Gas Analysis:       Invalid input(s): PCOVEN     CBC:   Results from last 7 days   Lab Units 23  0756 23  0803   WBC Thousand/uL 15 90* 20 89* 20 25*   HEMOGLOBIN g/dL 9 7* 10 9* 11 8   HEMATOCRIT % 28 6* 32 3* 34 6*   PLATELETS Thousands/uL 135* 156 149     PT/INR: No results found for: PT, INR, Microbiology:        *-*-*-*-*-*-*-*-*-*-*-*-*-*-*-*-*-*-*-*-*-*-*-*-*-*-*-*-*-*-*-*-*-*-*-*-*-*-*-*-*-*-*-*-*-*-*-*-*-*-*-*-*-*-  IMAGING STUDIES REPORTS: Imaging studies results reviewed    No valid procedures specified  No Chest XR results available for this patient  *-*-*-*-*-*-*-*-*-*-*-*-*-*-*-*-*-*-*-*-*-*-*-*-*-*-*-*-*-*-*-*-*-*-*-*-*-*-*-*-*-*-*-*-*-*-*-*-*-*-*-*-*-*-  AVAILABLE OLD CARDIAC TESTS REPORTS:   Results for orders placed during the hospital encounter of 19    Echo complete with contrast if indicated    Narrative  [facility] [address]    Transthoracic Echocardiogram  2D, M-mode, Doppler, and Color Doppler    Study date:  01-Aug-2019    Patient: Viktoria Garcia  MR number: [id number]  Account number: [de-identified]  : 2000  Age: 22 years  Gender: Female  Status: Outpatient  Location: [place] [facility]  Height: 63 in  Weight: 154 7 lb  BP: 102/ 70 mmHg    Indications: Murmur      Diagnoses: R01 1 - Cardiac murmur, unspecified    Sonographer:  JEFFERSON Gallegos  Referring Physician:  Dax Singh MD  Group:  [facility]  Interpreting Physician:  Scott Reed MD    SUMMARY    LEFT VENTRICLE:  Normal left ventricular systolic function, EF 98%  Normal left ventricular cavity size  Normal left ventricular wall thickness  Normal left ventricular wall motion without regional wall motion abnormalities  Normal left ventricular  diastolic function and normal left atrial pressures  TRICUSPID VALVE:  There was trace regurgitation  PULMONIC VALVE:  Mild pulmonic stenosis  Pulmonary artery velocity 2 4 m/sec with a peak gradient of 22 mmHg  Mild-to-moderate pulmonic regurgitation  HISTORY: PRIOR HISTORY: Pulmonic stenosis    PROCEDURE: The study was performed in the [facility]  This was a routine study  The transthoracic approach was used  The study included complete 2D imaging, M-mode, complete spectral Doppler, and color Doppler  The heart rate was 70  bpm, at the start of the study  Images were obtained from the parasternal, apical, subcostal, and suprasternal notch acoustic windows  Image quality was adequate  LEFT VENTRICLE: Normal left ventricular systolic function, EF 75%  Normal left ventricular cavity size  Normal left ventricular wall thickness  Normal left ventricular wall motion without regional wall motion abnormalities  Normal left  ventricular diastolic function and normal left atrial pressures  RIGHT VENTRICLE: The size was normal  Systolic function was normal  Wall thickness was normal     LEFT ATRIUM: Size was normal     RIGHT ATRIUM: Size was normal     MITRAL VALVE: Valve structure was normal  There was normal leaflet separation  DOPPLER: The transmitral velocity was within the normal range  There was no evidence for stenosis  There was no regurgitation  AORTIC VALVE: The valve was trileaflet  Leaflets exhibited normal thickness and normal cuspal separation  DOPPLER: Transaortic velocity was within the normal range  There was no evidence for stenosis  There was no regurgitation  TRICUSPID VALVE: The valve structure was normal  There was normal leaflet separation   DOPPLER: The transtricuspid velocity was within the normal range  There was no evidence for stenosis  There was trace regurgitation  PULMONIC VALVE: Mild pulmonic stenosis  Pulmonary artery velocity 2 4 m/sec with a peak gradient of 22 mmHg  Mild-to-moderate pulmonic regurgitation  PERICARDIUM: There was no pericardial effusion  The pericardium was normal in appearance  AORTA: The root exhibited normal size  PULMONARY ARTERY: The size was normal  DOPPLER: Systolic pressure was within the normal range  SYSTEM MEASUREMENT TABLES    2D  %FS: 39 59 %  Ao Diam: 2 37 cm  EDV(Teich): 95 31 ml  EF(Teich): 70 23 %  ESV(Teich): 28 37 ml  IVSd: 0 94 cm  LA Area: 14 3 cm2  LA Diam: 3 28 cm  LVEDV MOD A4C: 78 58 ml  LVEF MOD A4C: 64 41 %  LVESV MOD A4C: 27 96 ml  LVIDd: 4 56 cm  LVIDs: 2 75 cm  LVLd A4C: 9 35 cm  LVLs A4C: 6 83 cm  LVPWd: 0 88 cm  RA Area: 11 46 cm2  RVIDd: 2 86 cm  SV MOD A4C: 50 61 ml  SV(Teich): 66 94 ml    CW  PV Vmax: 2 36 m/s  PV maxP 4 mmHg  TR Vmax: 2 36 m/s  TR maxP 35 mmHg    MM  TAPSE: 2 39 cm    PW  E': 0 15 m/s  E/E': 5 7  MV A Himanshu: 0 61 m/s  MV Dec Ceiba: 2 84 m/s2  MV DecT: 303 06 ms  MV E Himanshu: 0 86 m/s  MV E/A Ratio: 1 4  MV PHT: 87 89 ms  MVA By PHT: 2 5 cm2    [facility]redited [facility]    Prepared and electronically signed by    Thea Westbrook MD  Signed 02-Aug-2019 10:44:42    No results found for this or any previous visit  No results found for this or any previous visit  No results found for this or any previous visit         *-*-*-*-*-*-*-*-*-*-*-*-*-*-*-*-*-*-*-*-*-*-*-*-*-*-*-*-*-*-*-*-*-*-*-*-*-*-*-*-*-*-*-*-*-*-*-*-*-*-*-*-*-*-  SIGNATURES:   [unfilled]   Sammy Donohue MD

## 2023-05-06 NOTE — PLAN OF CARE
Problem: POSTPARTUM  Goal: Experiences normal postpartum course  Description: INTERVENTIONS:  - Monitor maternal vital signs  - Assess uterine involution and lochia  Outcome: Progressing  Goal: Appropriate maternal -  bonding  Description: INTERVENTIONS:  - Identify family support  - Assess for appropriate maternal/infant bonding   -Encourage maternal/infant bonding opportunities  - Referral to  or  as needed  Outcome: Progressing  Goal: Establishment of infant feeding pattern  Description: INTERVENTIONS:  - Assess breast/bottle feeding  - Refer to lactation as needed  Outcome: Progressing  Goal: Incision(s), wounds(s) or drain site(s) healing without S/S of infection  Description: INTERVENTIONS  - Assess and document dressing, incision, wound bed, drain sites and surrounding tissue  - Provide patient and family education  - Perform skin care/dressing changes every   Outcome: Progressing     Problem: PAIN - ADULT  Goal: Verbalizes/displays adequate comfort level or baseline comfort level  Description: Interventions:  - Encourage patient to monitor pain and request assistance  - Assess pain using appropriate pain scale  - Administer analgesics based on type and severity of pain and evaluate response  - Implement non-pharmacological measures as appropriate and evaluate response  - Consider cultural and social influences on pain and pain management  - Notify physician/advanced practitioner if interventions unsuccessful or patient reports new pain  Outcome: Progressing     Problem: INFECTION - ADULT  Goal: Absence or prevention of progression during hospitalization  Description: INTERVENTIONS:  - Assess and monitor for signs and symptoms of infection  - Monitor lab/diagnostic results  - Monitor all insertion sites, i e  indwelling lines, tubes, and drains  - Monitor endotracheal if appropriate and nasal secretions for changes in amount and color  - [place] appropriate cooling/warming therapies per order  - Administer medications as ordered  - Instruct and encourage patient and family to use good hand hygiene technique  - Identify and instruct in appropriate isolation precautions for identified infection/condition  Outcome: Progressing  Goal: Absence of fever/infection during neutropenic period  Description: INTERVENTIONS:  - Monitor WBC    Outcome: Progressing     Problem: SAFETY ADULT  Goal: Patient will remain free of falls  Description: INTERVENTIONS:  - Educate patient/family on patient safety including physical limitations  - Instruct patient to call for assistance with activity   - Consult OT/PT to assist with strengthening/mobility   - Keep Call bell within reach  - Keep bed low and locked with side rails adjusted as appropriate  - Keep care items and personal belongings within reach  - Initiate and maintain comfort rounds  - Make Fall Risk Sign visible to staff  - Offer Toileting every  Hours, in advance of need  - Initiate/Maintain alarm  - Obtain necessary fall risk management equipment:   - Apply yellow socks and bracelet for high fall risk patients  - Consider moving patient to room near nurses station  Outcome: Progressing  Goal: Maintain or return to baseline ADL function  Description: INTERVENTIONS:  -  Assess patient's ability to carry out ADLs; assess patient's baseline for ADL function and identify physical deficits which impact ability to perform ADLs (bathing, care of mouth/teeth, toileting, grooming, dressing, etc )  - Assess/evaluate cause of self-care deficits   - Assess range of motion  - Assess patient's mobility; develop plan if impaired  - Assess patient's need for assistive devices and provide as appropriate  - Encourage maximum independence but intervene and supervise when necessary  - Involve family in performance of ADLs  - Assess for home care needs following discharge   - Consider OT consult to assist with ADL evaluation and planning for discharge  - Provide patient education as appropriate  Outcome: Progressing  Goal: Maintains/Returns to pre admission functional level  Description: INTERVENTIONS:  - Perform BMAT or MOVE assessment daily    - Set and communicate daily mobility goal to care team and patient/family/caregiver  - Collaborate with rehabilitation services on mobility goals if consulted  - Perform Range of Motion  times a day  - Reposition patient every  hours  - Dangle patient  times a day  - Stand patient  times a day  - Ambulate patient times a day  - Out of bed to chair  times a day   - Out of bed for meals  times a day  - Out of bed for toileting  - Record patient progress and toleration of activity level   Outcome: Progressing     Problem: Knowledge Deficit  Goal: Patient/family/caregiver demonstrates understanding of disease process, treatment plan, medications, and discharge instructions  Description: Complete learning assessment and assess knowledge base    Interventions:  - Provide teaching at level of understanding  - Provide teaching via preferred learning methods  Outcome: Progressing     Problem: DISCHARGE PLANNING  Goal: Discharge to home or other facility with appropriate resources  Description: INTERVENTIONS:  - Identify barriers to discharge w/patient and caregiver  - Arrange for needed discharge resources and transportation as appropriate  - Identify discharge learning needs (meds, wound care, etc )  - Arrange for interpretive services to assist at discharge as needed  - Refer to Case Management Department for coordinating discharge planning if the patient needs post-hospital services based on physician/advanced practitioner order or complex needs related to functional status, cognitive ability, or social support system  Outcome: Progressing

## 2023-05-06 NOTE — LACTATION NOTE
This note was copied from a baby's chart  Mother verbalized breast/bottle feeding is going well  States she found a position that works well for her and feels her milk coming in  Denies need for assistance, encouraged to call for latch assist     Discussed risks for early supplementation: over feeding, longer digestion times, engorgement for mom, lower milk supply for mom, and nipple confusion  Benefits of breast feeding for infant's intestinal tract, less engorgement for mom, protection from multiple disease processes as infant develops, avoidance of over feeding for infant, less nipple confusion, and increased health benefits for mom  Encouraged NOT to supplement OR start pumping to establish a good milk supply  Reminded of breastfeeding support available in hospital as well as outpatient support  Enc to call for assistance as needed,phone # given  Family support at bedside

## 2023-05-06 NOTE — PLAN OF CARE
Problem: POSTPARTUM  Goal: Experiences normal postpartum course  Description: INTERVENTIONS:  - Monitor maternal vital signs  - Assess uterine involution and lochia  Outcome: Progressing  Goal: Appropriate maternal -  bonding  Description: INTERVENTIONS:  - Identify family support  - Assess for appropriate maternal/infant bonding   -Encourage maternal/infant bonding opportunities  - Referral to  or  as needed  Outcome: Progressing  Goal: Establishment of infant feeding pattern  Description: INTERVENTIONS:  - Assess breast/bottle feeding  - Refer to lactation as needed  Outcome: Progressing  Goal: Incision(s), wounds(s) or drain site(s) healing without S/S of infection  Description: INTERVENTIONS  - Assess and document dressing, incision, wound bed, drain sites and surrounding tissue  - Provide patient and family education  Outcome: Progressing     Problem: PAIN - ADULT  Goal: Verbalizes/displays adequate comfort level or baseline comfort level  Description: Interventions:  - Encourage patient to monitor pain and request assistance  - Assess pain using appropriate pain scale  - Administer analgesics based on type and severity of pain and evaluate response  - Implement non-pharmacological measures as appropriate and evaluate response  - Consider cultural and social influences on pain and pain management  - Notify physician/advanced practitioner if interventions unsuccessful or patient reports new pain  Outcome: Progressing     Problem: INFECTION - ADULT  Goal: Absence or prevention of progression during hospitalization  Description: INTERVENTIONS:  - Assess and monitor for signs and symptoms of infection  - Monitor lab/diagnostic results  - Monitor all insertion sites, i e  indwelling lines, tubes, and drains  - Monitor endotracheal if appropriate and nasal secretions for changes in amount and color  - [place] appropriate cooling/warming therapies per order  - Administer medications as ordered  - Instruct and encourage patient and family to use good hand hygiene technique  - Identify and instruct in appropriate isolation precautions for identified infection/condition  Outcome: Progressing  Goal: Absence of fever/infection during neutropenic period  Description: INTERVENTIONS:  - Monitor WBC    Outcome: Progressing     Problem: SAFETY ADULT  Goal: Patient will remain free of falls  Description: INTERVENTIONS:  - Educate patient/family on patient safety including physical limitations  - Instruct patient to call for assistance with activity   - Consult OT/PT to assist with strengthening/mobility   - Keep Call bell within reach  - Keep bed low and locked with side rails adjusted as appropriate  - Keep care items and personal belongings within reach  - Initiate and maintain comfort rounds  Outcome: Progressing  Goal: Maintain or return to baseline ADL function  Description: INTERVENTIONS:  -  Assess patient's ability to carry out ADLs; assess patient's baseline for ADL function and identify physical deficits which impact ability to perform ADLs (bathing, care of mouth/teeth, toileting, grooming, dressing, etc )  - Assess/evaluate cause of self-care deficits   - Assess range of motion  - Assess patient's mobility; develop plan if impaired  - Assess patient's need for assistive devices and provide as appropriate  - Encourage maximum independence but intervene and supervise when necessary  - Involve family in performance of ADLs  - Assess for home care needs following discharge   - Consider OT consult to assist with ADL evaluation and planning for discharge  - Provide patient education as appropriate  Outcome: Progressing  Goal: Maintains/Returns to pre admission functional level  Description: INTERVENTIONS:  - Perform BMAT or MOVE assessment daily    - Set and communicate daily mobility goal to care team and patient/family/caregiver     - Collaborate with rehabilitation services on mobility goals if consulted  - Record patient progress and toleration of activity level   Outcome: Progressing     Problem: Knowledge Deficit  Goal: Patient/family/caregiver demonstrates understanding of disease process, treatment plan, medications, and discharge instructions  Description: Complete learning assessment and assess knowledge base    Interventions:  - Provide teaching at level of understanding  - Provide teaching via preferred learning methods  Outcome: Progressing     Problem: DISCHARGE PLANNING  Goal: Discharge to home or other facility with appropriate resources  Description: INTERVENTIONS:  - Identify barriers to discharge w/patient and caregiver  - Arrange for needed discharge resources and transportation as appropriate  - Identify discharge learning needs (meds, wound care, etc )  - Arrange for interpretive services to assist at discharge as needed  - Refer to Case Management Department for coordinating discharge planning if the patient needs post-hospital services based on physician/advanced practitioner order or complex needs related to functional status, cognitive ability, or social support system  Outcome: Progressing

## 2023-05-07 LAB
ALBUMIN SERPL BCP-MCNC: 3.3 G/DL (ref 3.5–5)
ALP SERPL-CCNC: 101 U/L (ref 34–104)
ALT SERPL W P-5'-P-CCNC: 178 U/L (ref 7–52)
ANION GAP SERPL CALCULATED.3IONS-SCNC: 9 MMOL/L (ref 4–13)
AST SERPL W P-5'-P-CCNC: 141 U/L (ref 13–39)
BILIRUB SERPL-MCNC: 0.28 MG/DL (ref 0.2–1)
BUN SERPL-MCNC: 17 MG/DL (ref 5–25)
CALCIUM ALBUM COR SERPL-MCNC: 9.8 MG/DL (ref 8.3–10.1)
CALCIUM SERPL-MCNC: 9.2 MG/DL (ref 8.4–10.2)
CHLORIDE SERPL-SCNC: 104 MMOL/L (ref 96–108)
CO2 SERPL-SCNC: 22 MMOL/L (ref 21–32)
CREAT SERPL-MCNC: 0.66 MG/DL (ref 0.6–1.3)
ERYTHROCYTE [DISTWIDTH] IN BLOOD BY AUTOMATED COUNT: 12.9 % (ref 11.6–15.1)
GFR SERPL CREATININE-BSD FRML MDRD: 124 ML/MIN/1.73SQ M
GLUCOSE SERPL-MCNC: 90 MG/DL (ref 65–140)
HCT VFR BLD AUTO: 30.8 % (ref 34.8–46.1)
HGB BLD-MCNC: 10.2 G/DL (ref 11.5–15.4)
MCH RBC QN AUTO: 31.8 PG (ref 26.8–34.3)
MCHC RBC AUTO-ENTMCNC: 33.1 G/DL (ref 31.4–37.4)
MCV RBC AUTO: 96 FL (ref 82–98)
PLATELET # BLD AUTO: 143 THOUSANDS/UL (ref 149–390)
PMV BLD AUTO: 11.7 FL (ref 8.9–12.7)
POTASSIUM SERPL-SCNC: 4.5 MMOL/L (ref 3.5–5.3)
PROT SERPL-MCNC: 6.3 G/DL (ref 6.4–8.4)
RBC # BLD AUTO: 3.21 MILLION/UL (ref 3.81–5.12)
SODIUM SERPL-SCNC: 135 MMOL/L (ref 135–147)
WBC # BLD AUTO: 13.6 THOUSAND/UL (ref 4.31–10.16)

## 2023-05-07 RX ORDER — HYDRALAZINE HYDROCHLORIDE 20 MG/ML
5 INJECTION INTRAMUSCULAR; INTRAVENOUS ONCE
Status: COMPLETED | OUTPATIENT
Start: 2023-05-07 | End: 2023-05-07

## 2023-05-07 RX ORDER — NIFEDIPINE 30 MG/1
30 TABLET, EXTENDED RELEASE ORAL ONCE
Status: COMPLETED | OUTPATIENT
Start: 2023-05-07 | End: 2023-05-07

## 2023-05-07 RX ORDER — HYDRALAZINE HYDROCHLORIDE 20 MG/ML
10 INJECTION INTRAMUSCULAR; INTRAVENOUS ONCE
Status: COMPLETED | OUTPATIENT
Start: 2023-05-07 | End: 2023-05-07

## 2023-05-07 RX ORDER — LABETALOL HYDROCHLORIDE 5 MG/ML
40 INJECTION, SOLUTION INTRAVENOUS ONCE
Status: COMPLETED | OUTPATIENT
Start: 2023-05-07 | End: 2023-05-07

## 2023-05-07 RX ORDER — LABETALOL HYDROCHLORIDE 5 MG/ML
20 INJECTION, SOLUTION INTRAVENOUS ONCE
Status: COMPLETED | OUTPATIENT
Start: 2023-05-07 | End: 2023-05-07

## 2023-05-07 RX ORDER — NIFEDIPINE 30 MG/1
90 TABLET, EXTENDED RELEASE ORAL DAILY
Status: DISCONTINUED | OUTPATIENT
Start: 2023-05-08 | End: 2023-05-09

## 2023-05-07 RX ORDER — LABETALOL HYDROCHLORIDE 5 MG/ML
60 INJECTION, SOLUTION INTRAVENOUS ONCE
Status: COMPLETED | OUTPATIENT
Start: 2023-05-07 | End: 2023-05-07

## 2023-05-07 RX ORDER — NIFEDIPINE 30 MG/1
60 TABLET, EXTENDED RELEASE ORAL DAILY
Status: DISCONTINUED | OUTPATIENT
Start: 2023-05-07 | End: 2023-05-07

## 2023-05-07 RX ORDER — LABETALOL HYDROCHLORIDE 5 MG/ML
80 INJECTION, SOLUTION INTRAVENOUS ONCE
Status: COMPLETED | OUTPATIENT
Start: 2023-05-07 | End: 2023-05-07

## 2023-05-07 RX ADMIN — LABETALOL HYDROCHLORIDE 40 MG: 5 INJECTION, SOLUTION INTRAVENOUS at 17:13

## 2023-05-07 RX ADMIN — IBUPROFEN 600 MG: 600 TABLET, FILM COATED ORAL at 17:22

## 2023-05-07 RX ADMIN — HYDRALAZINE HYDROCHLORIDE 5 MG: 20 INJECTION INTRAMUSCULAR; INTRAVENOUS at 20:10

## 2023-05-07 RX ADMIN — NIFEDIPINE 60 MG: 30 TABLET, FILM COATED, EXTENDED RELEASE ORAL at 05:15

## 2023-05-07 RX ADMIN — DOCUSATE SODIUM 100 MG: 100 CAPSULE, LIQUID FILLED ORAL at 08:13

## 2023-05-07 RX ADMIN — HYDRALAZINE HYDROCHLORIDE 10 MG: 20 INJECTION INTRAMUSCULAR; INTRAVENOUS at 19:21

## 2023-05-07 RX ADMIN — LABETALOL HYDROCHLORIDE 80 MG: 5 INJECTION, SOLUTION INTRAVENOUS at 18:32

## 2023-05-07 RX ADMIN — IBUPROFEN 600 MG: 600 TABLET, FILM COATED ORAL at 10:53

## 2023-05-07 RX ADMIN — IBUPROFEN 600 MG: 600 TABLET, FILM COATED ORAL at 04:43

## 2023-05-07 RX ADMIN — ACETAMINOPHEN 325MG 650 MG: 325 TABLET ORAL at 13:24

## 2023-05-07 RX ADMIN — LABETALOL HYDROCHLORIDE 60 MG: 5 INJECTION, SOLUTION INTRAVENOUS at 17:54

## 2023-05-07 RX ADMIN — ACETAMINOPHEN 325MG 650 MG: 325 TABLET ORAL at 20:15

## 2023-05-07 RX ADMIN — ACETAMINOPHEN 325MG 650 MG: 325 TABLET ORAL at 06:29

## 2023-05-07 RX ADMIN — NIFEDIPINE 30 MG: 30 TABLET, FILM COATED, EXTENDED RELEASE ORAL at 19:01

## 2023-05-07 RX ADMIN — LABETALOL HYDROCHLORIDE 20 MG: 5 INJECTION, SOLUTION INTRAVENOUS at 16:07

## 2023-05-07 RX ADMIN — ACETAMINOPHEN 325MG 650 MG: 325 TABLET ORAL at 00:32

## 2023-05-07 RX ADMIN — ENOXAPARIN SODIUM 40 MG: 100 INJECTION SUBCUTANEOUS at 08:13

## 2023-05-07 NOTE — NURSING NOTE
"Discharge education reviewed with patient and spouse at bedside  Informational packet and \"save your life\" magnet provided  Opportunity for questions given, and patient/spouse verbalized understanding     "

## 2023-05-07 NOTE — PLAN OF CARE
Problem: POSTPARTUM  Goal: Experiences normal postpartum course  Description: INTERVENTIONS:  - Monitor maternal vital signs  - Assess uterine involution and lochia  Outcome: Progressing  Goal: Appropriate maternal -  bonding  Description: INTERVENTIONS:  - Identify family support  - Assess for appropriate maternal/infant bonding   -Encourage maternal/infant bonding opportunities  - Referral to  or  as needed  Outcome: Progressing  Goal: Establishment of infant feeding pattern  Description: INTERVENTIONS:  - Assess breast/bottle feeding  - Refer to lactation as needed  Outcome: Progressing  Goal: Incision(s), wounds(s) or drain site(s) healing without S/S of infection  Description: INTERVENTIONS  - Assess and document dressing, incision, wound bed, drain sites and surrounding tissue  - Provide patient and family education  - Perform skin care/dressing changes every   Outcome: Progressing     Problem: PAIN - ADULT  Goal: Verbalizes/displays adequate comfort level or baseline comfort level  Description: Interventions:  - Encourage patient to monitor pain and request assistance  - Assess pain using appropriate pain scale  - Administer analgesics based on type and severity of pain and evaluate response  - Implement non-pharmacological measures as appropriate and evaluate response  - Consider cultural and social influences on pain and pain management  - Notify physician/advanced practitioner if interventions unsuccessful or patient reports new pain  Outcome: Progressing     Problem: INFECTION - ADULT  Goal: Absence or prevention of progression during hospitalization  Description: INTERVENTIONS:  - Assess and monitor for signs and symptoms of infection  - Monitor lab/diagnostic results  - Monitor all insertion sites, i e  indwelling lines, tubes, and drains  - Monitor endotracheal if appropriate and nasal secretions for changes in amount and color  - [place] appropriate cooling/warming therapies per order  - Administer medications as ordered  - Instruct and encourage patient and family to use good hand hygiene technique  - Identify and instruct in appropriate isolation precautions for identified infection/condition  Outcome: Progressing  Goal: Absence of fever/infection during neutropenic period  Description: INTERVENTIONS:  - Monitor WBC    Outcome: Progressing     Problem: SAFETY ADULT  Goal: Patient will remain free of falls  Description: INTERVENTIONS:  - Educate patient/family on patient safety including physical limitations  - Instruct patient to call for assistance with activity   - Consult OT/PT to assist with strengthening/mobility   - Keep Call bell within reach  - Keep bed low and locked with side rails adjusted as appropriate  - Keep care items and personal belongings within reach  - Initiate and maintain comfort rounds  - Make Fall Risk Sign visible to staff  - Offer Toileting every  Hours, in advance of need  - Initiate/Maintain alarm  - Obtain necessary fall risk management equipment:   - Apply yellow socks and bracelet for high fall risk patients  - Consider moving patient to room near nurses station  Outcome: Progressing  Goal: Maintain or return to baseline ADL function  Description: INTERVENTIONS:  -  Assess patient's ability to carry out ADLs; assess patient's baseline for ADL function and identify physical deficits which impact ability to perform ADLs (bathing, care of mouth/teeth, toileting, grooming, dressing, etc )  - Assess/evaluate cause of self-care deficits   - Assess range of motion  - Assess patient's mobility; develop plan if impaired  - Assess patient's need for assistive devices and provide as appropriate  - Encourage maximum independence but intervene and supervise when necessary  - Involve family in performance of ADLs  - Assess for home care needs following discharge   - Consider OT consult to assist with ADL evaluation and planning for discharge  - Provide patient education as appropriate  Outcome: Progressing  Goal: Maintains/Returns to pre admission functional level  Description: INTERVENTIONS:  - Perform BMAT or MOVE assessment daily    - Set and communicate daily mobility goal to care team and patient/family/caregiver  - Collaborate with rehabilitation services on mobility goals if consulted  - Perform Range of Motion  times a day  - Reposition patient every  hours  - Dangle patient  times a day  - Stand patient  times a day  - Ambulate patient  times a day  - Out of bed to chair  times a day   - Out of bed for meals times a day  - Out of bed for toileting  - Record patient progress and toleration of activity level   Outcome: Progressing     Problem: Knowledge Deficit  Goal: Patient/family/caregiver demonstrates understanding of disease process, treatment plan, medications, and discharge instructions  Description: Complete learning assessment and assess knowledge base    Interventions:  - Provide teaching at level of understanding  - Provide teaching via preferred learning methods  Outcome: Progressing     Problem: DISCHARGE PLANNING  Goal: Discharge to home or other facility with appropriate resources  Description: INTERVENTIONS:  - Identify barriers to discharge w/patient and caregiver  - Arrange for needed discharge resources and transportation as appropriate  - Identify discharge learning needs (meds, wound care, etc )  - Arrange for interpretive services to assist at discharge as needed  - Refer to Case Management Department for coordinating discharge planning if the patient needs post-hospital services based on physician/advanced practitioner order or complex needs related to functional status, cognitive ability, or social support system  Outcome: Progressing

## 2023-05-07 NOTE — QUICK NOTE
Patient evaluated at bedside due to persistently elevated blood pressures despite acute treatment with 20/40/60/80 of IV labetalol and 10 of IV hydralazine  An additional 30mg of Procardia XL was also given for a total of 90mg PO today  She remains asymptomatic with no headache, visual changes or upper abdominal pain  She is overall very well appearing  Labs were repeated with platelet count stable at 143  CMP is pending  Discussed concerns about her high blood pressure and mentioned the possibility of needing to transfer her to a higher level of care if we reach the maximum dose of hydralazine  All questions answered  Will await the next BP reading      Vitals:    05/07/23 1935   BP: 165/98   Pulse: 87   Resp:    Temp:    SpO2:      Shruthi Bullard MD   OB/Gyn PGY-2  7:57 PM  05/07/23 Xeljanz Counseling: I discussed with the patient the risks of Xeljanz therapy including increased risk of infection, liver issues, headache, diarrhea, or cold symptoms. Live vaccines should be avoided. They were instructed to call if they have any problems.

## 2023-05-07 NOTE — PLAN OF CARE

## 2023-05-07 NOTE — PROGRESS NOTES
Progress Note - OB/GYN  Henry Esquivel 20 y o  female MRN: [id number]  Unit/Bed#: L&D 304-01 Encounter: [id number]    Assessment and Plan:  Henry Esquivel is a patient of: [facility]  She is PPD# 3 s/p  primary  section, low transverse incision for preeclampsia with severe features which was then done STAT for fetal heart tones, no with elevated BP postpartum requiring PO BP medication titration  By issue:      * Status post primary low transverse  section  Assessment & Plan   mL  Hgb 12 5 --> 13 0 --> 11 8  Pain well controlled  Voiding spontaneously  Appropriate post-operative bowel function  Tolerating PO fluids and solids  Breastfeeding  Lochia wnl  • Continue routine postpartum care  • Postpartum DVT ppx with SCDs and Lovenox 40 mg daily (BMI 34)    Thrombocytopenia affecting pregnancy, antepartum ([facility] [address] )  Assessment & Plan  Platelets [id number] since admission, stable  · No further labs indicated    Pre-eclampsia in third trimester  Assessment & Plan  Diagnosed with preeclampsia with severe features after sustained SRBP on arrival to L&D  Required acute IV treatment intrapartum with Labetalol 20/40/40/80 mg IV and Hydralazine 5/5/10 mg IV on 23 prior to delivery  Started on Procardia XL 30 mg daily on 23 prior to delivery  Procardia XL increased from 30 mg to 60 mg daily 23 due to elevated BP  S/p 24 hr postpartum magnesium for seizure ppx  Hgb 12 5 (admission) --> 13 0 (immediately post-op) --> stable on subsequent labs  Plt 137 (admission) --> 153 (immediately post-op) --> stable on subsequent labs  UP:C 0 61  CMP wnl  Asympotmatic  Systolic (22ZBQ), LMB:749 , Min:131 , REQ:128   Diastolic (36LPU), DEH:88, Min:74, Max:97    · Monitor BP  · Continue Procardia XL 60 mg daily  · Monitor for signs/symptoms of worsening preeclampsia    Anemia during pregnancy in third trimester  Assessment & Plan  Admission Hgb 12 5      Congenital pulmonary stenosis  Assessment & "Plan  Last maternal echo  with mild stenosis and mild to moderate regurgitation  Fetal cardiology - echo is normal - by peds Dr Satish Reyna  Cardiology contacted via phone regarding recommendations for  versus trial of labor and recommendations for antihypertensives  Per on call cardiologist -no contraindication against attempted vaginal delivery or  at this time both labetalol and hydralazine are okay to use for antihypertensive  Repeat echo 23 largely stable per cardiology with no acute findings  Disposition    - Anticipate discharge home on PPD# 4      Subjective/Objective     Chief Complaint: Postpartum State     Subjective:    Mindy Cruz is PPD/POD#3 s/p  primary  section, low transverse incisionfor preeclampsia with severe features which was then done STAT for fetal heart tones, no with elevated BP postpartum requiring PO BP medication titration  She has no current complaints  Pain is well controlled  Patient is currently voiding  She is ambulating  Patient is currently passing flatus and has had bowel movement  She is tolerating PO, and denies nausea or vomitting  Patient denies fever, chills, chest pain, shortness of breath, or calf tenderness  Lochia is normal  She is  Breastfeeding  She is recovering well and is stable  She denies headache, vision changes, chest pain, SOB, RUQ/epigastric pain, or increased swelling  Vitals:   /91 (BP Location: Right arm) Comment: Dr Joaquina Tomlinson aware  Pulse 71   Temp 98 7 °F (37 1 °C) (Oral)   Resp 18   Ht 5' 3\" (1 6 m)   Wt 89 4 kg (197 lb)   LMP 2022   SpO2 97%   Breastfeeding Yes   BMI 34 90 kg/m²     No intake or output data in the 24 hours ending 23 0749    Invasive Devices     Peripheral Intravenous Line  Duration           Peripheral IV 23 Dorsal (posterior); Left Forearm 2 days                Physical Exam:   GEN: Mindy Cruz appears well, alert and oriented x 3, pleasant and " cooperative   CARDIO: RRR, no murmurs or rubs  RESP:  CTAB, no wheezes or rales  ABDOMEN: soft, no tenderness, no distention, fundus @ U -3, Incision C/D/I  EXTREMITIES: SCDs on, non tender, no erythema, b/l Emily's sign negative      Labs:     Hemoglobin   Date Value Ref Range Status   05/06/2023 9 7 (L) 11 5 - 15 4 g/dL Final   05/05/2023 10 9 (L) 11 5 - 15 4 g/dL Final     WBC   Date Value Ref Range Status   05/06/2023 15 90 (H) 4 31 - 10 16 Thousand/uL Final   05/05/2023 20 89 (H) 4 31 - 10 16 Thousand/uL Final     Platelets   Date Value Ref Range Status   05/06/2023 135 (L) 149 - 390 Thousands/uL Final   05/05/2023 156 149 - 390 Thousands/uL Final     Creatinine   Date Value Ref Range Status   05/06/2023 0 63 0 60 - 1 30 mg/dL Final     Comment:     Standardized to IDMS reference method   05/05/2023 0 76 0 60 - 1 30 mg/dL Final     Comment:     Standardized to IDMS reference method     AST   Date Value Ref Range Status   05/06/2023 24 13 - 39 U/L Final   05/05/2023 30 13 - 39 U/L Final     ALT   Date Value Ref Range Status   05/06/2023 35 7 - 52 U/L Final     Comment:     Specimen collection should occur prior to Sulfasalazine administration due to the potential for falsely depressed results  05/05/2023 40 7 - 52 U/L Final     Comment:     Specimen collection should occur prior to Sulfasalazine administration due to the potential for falsely depressed results             Valentino Frater, MD  5/7/2023  7:49 AM                   \

## 2023-05-08 LAB
ALBUMIN SERPL BCP-MCNC: 3.4 G/DL (ref 3.5–5)
ALBUMIN SERPL BCP-MCNC: 3.5 G/DL (ref 3.5–5)
ALP SERPL-CCNC: 100 U/L (ref 34–104)
ALP SERPL-CCNC: 112 U/L (ref 34–104)
ALT SERPL W P-5'-P-CCNC: 326 U/L (ref 7–52)
ALT SERPL W P-5'-P-CCNC: 389 U/L (ref 7–52)
ANION GAP SERPL CALCULATED.3IONS-SCNC: 11 MMOL/L (ref 4–13)
ANION GAP SERPL CALCULATED.3IONS-SCNC: 7 MMOL/L (ref 4–13)
AST SERPL W P-5'-P-CCNC: 222 U/L (ref 13–39)
AST SERPL W P-5'-P-CCNC: 233 U/L (ref 13–39)
BASOPHILS # BLD AUTO: 0.04 THOUSANDS/ÂΜL (ref 0–0.1)
BASOPHILS NFR BLD AUTO: 0 % (ref 0–1)
BILIRUB SERPL-MCNC: 0.31 MG/DL (ref 0.2–1)
BILIRUB SERPL-MCNC: 0.33 MG/DL (ref 0.2–1)
BUN SERPL-MCNC: 15 MG/DL (ref 5–25)
BUN SERPL-MCNC: 17 MG/DL (ref 5–25)
CALCIUM ALBUM COR SERPL-MCNC: 9.8 MG/DL (ref 8.3–10.1)
CALCIUM SERPL-MCNC: 9.2 MG/DL (ref 8.4–10.2)
CALCIUM SERPL-MCNC: 9.3 MG/DL (ref 8.4–10.2)
CHLORIDE SERPL-SCNC: 103 MMOL/L (ref 96–108)
CHLORIDE SERPL-SCNC: 104 MMOL/L (ref 96–108)
CO2 SERPL-SCNC: 21 MMOL/L (ref 21–32)
CO2 SERPL-SCNC: 25 MMOL/L (ref 21–32)
CREAT SERPL-MCNC: 0.54 MG/DL (ref 0.6–1.3)
CREAT SERPL-MCNC: 0.75 MG/DL (ref 0.6–1.3)
EOSINOPHIL # BLD AUTO: 0.24 THOUSAND/ÂΜL (ref 0–0.61)
EOSINOPHIL NFR BLD AUTO: 2 % (ref 0–6)
ERYTHROCYTE [DISTWIDTH] IN BLOOD BY AUTOMATED COUNT: 13.2 % (ref 11.6–15.1)
ERYTHROCYTE [DISTWIDTH] IN BLOOD BY AUTOMATED COUNT: 13.2 % (ref 11.6–15.1)
GFR SERPL CREATININE-BSD FRML MDRD: 112 ML/MIN/1.73SQ M
GFR SERPL CREATININE-BSD FRML MDRD: 133 ML/MIN/1.73SQ M
GLUCOSE SERPL-MCNC: 122 MG/DL (ref 65–140)
GLUCOSE SERPL-MCNC: 99 MG/DL (ref 65–140)
HCT VFR BLD AUTO: 31.7 % (ref 34.8–46.1)
HCT VFR BLD AUTO: 32.5 % (ref 34.8–46.1)
HGB BLD-MCNC: 10.6 G/DL (ref 11.5–15.4)
HGB BLD-MCNC: 10.6 G/DL (ref 11.5–15.4)
IMM GRANULOCYTES # BLD AUTO: 0.21 THOUSAND/UL (ref 0–0.2)
IMM GRANULOCYTES NFR BLD AUTO: 2 % (ref 0–2)
LYMPHOCYTES # BLD AUTO: 1.45 THOUSANDS/ÂΜL (ref 0.6–4.47)
LYMPHOCYTES NFR BLD AUTO: 12 % (ref 14–44)
MCH RBC QN AUTO: 31.5 PG (ref 26.8–34.3)
MCH RBC QN AUTO: 31.9 PG (ref 26.8–34.3)
MCHC RBC AUTO-ENTMCNC: 32.6 G/DL (ref 31.4–37.4)
MCHC RBC AUTO-ENTMCNC: 33.4 G/DL (ref 31.4–37.4)
MCV RBC AUTO: 96 FL (ref 82–98)
MCV RBC AUTO: 97 FL (ref 82–98)
MONOCYTES # BLD AUTO: 0.85 THOUSAND/ÂΜL (ref 0.17–1.22)
MONOCYTES NFR BLD AUTO: 7 % (ref 4–12)
NEUTROPHILS # BLD AUTO: 9.53 THOUSANDS/ÂΜL (ref 1.85–7.62)
NEUTS SEG NFR BLD AUTO: 77 % (ref 43–75)
NRBC BLD AUTO-RTO: 0 /100 WBCS
PLATELET # BLD AUTO: 167 THOUSANDS/UL (ref 149–390)
PLATELET # BLD AUTO: 179 THOUSANDS/UL (ref 149–390)
PMV BLD AUTO: 11.7 FL (ref 8.9–12.7)
PMV BLD AUTO: 12.5 FL (ref 8.9–12.7)
POTASSIUM SERPL-SCNC: 4 MMOL/L (ref 3.5–5.3)
POTASSIUM SERPL-SCNC: 4 MMOL/L (ref 3.5–5.3)
PROT SERPL-MCNC: 6.4 G/DL (ref 6.4–8.4)
PROT SERPL-MCNC: 6.7 G/DL (ref 6.4–8.4)
RBC # BLD AUTO: 3.32 MILLION/UL (ref 3.81–5.12)
RBC # BLD AUTO: 3.36 MILLION/UL (ref 3.81–5.12)
SODIUM SERPL-SCNC: 135 MMOL/L (ref 135–147)
SODIUM SERPL-SCNC: 136 MMOL/L (ref 135–147)
WBC # BLD AUTO: 12.32 THOUSAND/UL (ref 4.31–10.16)
WBC # BLD AUTO: 12.64 THOUSAND/UL (ref 4.31–10.16)

## 2023-05-08 RX ORDER — HYDRALAZINE HYDROCHLORIDE 25 MG/1
25 TABLET, FILM COATED ORAL EVERY 8 HOURS SCHEDULED
Status: DISCONTINUED | OUTPATIENT
Start: 2023-05-08 | End: 2023-05-08

## 2023-05-08 RX ORDER — FUROSEMIDE 20 MG/1
20 TABLET ORAL ONCE
Status: COMPLETED | OUTPATIENT
Start: 2023-05-08 | End: 2023-05-08

## 2023-05-08 RX ORDER — HYDRALAZINE HYDROCHLORIDE 20 MG/ML
10 INJECTION INTRAMUSCULAR; INTRAVENOUS ONCE
Status: COMPLETED | OUTPATIENT
Start: 2023-05-08 | End: 2023-05-08

## 2023-05-08 RX ADMIN — HYDRALAZINE HYDROCHLORIDE 10 MG: 20 INJECTION, SOLUTION INTRAMUSCULAR; INTRAVENOUS at 04:22

## 2023-05-08 RX ADMIN — NIFEDIPINE 90 MG: 30 TABLET, FILM COATED, EXTENDED RELEASE ORAL at 09:06

## 2023-05-08 RX ADMIN — ENOXAPARIN SODIUM 40 MG: 100 INJECTION SUBCUTANEOUS at 08:44

## 2023-05-08 RX ADMIN — METOPROLOL TARTRATE 25 MG: 25 TABLET, FILM COATED ORAL at 11:37

## 2023-05-08 RX ADMIN — DOCUSATE SODIUM 100 MG: 100 CAPSULE, LIQUID FILLED ORAL at 08:44

## 2023-05-08 RX ADMIN — IBUPROFEN 600 MG: 600 TABLET, FILM COATED ORAL at 06:16

## 2023-05-08 RX ADMIN — FUROSEMIDE 20 MG: 20 TABLET ORAL at 11:38

## 2023-05-08 RX ADMIN — IBUPROFEN 600 MG: 600 TABLET, FILM COATED ORAL at 11:39

## 2023-05-08 RX ADMIN — METOPROLOL TARTRATE 25 MG: 25 TABLET, FILM COATED ORAL at 21:43

## 2023-05-08 NOTE — PROGRESS NOTES
CARDIOLOGY INPATIENT FOLLOW-UP PROGRESS NOTE  *-*-*-*-*-*-*-*-*-*-*-*-*-*-*-*-*-*-*-*-*-*-*-*-*-*-*-*-*-*-*-*-*-*-*-*-*-*-*-*-*-*-*-*-*-*-*-*-*-*-*-*-*-*-  AZGZTCIAG DATE: 23 9:44 AM   AUTHOR: Suly Cintron MD  PATIENT: Latisha Correia   2000    [id number]   12 y o    female  INPATIENT HOSPITALIST PHYSICIAN: Macario Trevino*  DATE OF ADMISSION: 2023  5:10 PM; LENGTH OF STAY: 4 days  *-*-*-*-*-*-*-*-*-*-*-*-*-*-*-*-*-*-*-*-*-*-*-*-*-*-*-*-*-*-*-*-*-*-*-*-*-*-*-*-*-*-*-*-*-*-*-*-*-*-*-*-*-*-    CARDIOLOGY ASSESSMENT:  1  Preeclampsia with hypertension, without symptoms  ,  Minimal proteinuria  2  POD 4 s/p low transverse  section  3  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  4  Anemia during pregnancy  5  Borderline thrombocytopenia  6  Hypertensive heart disease without heart failure  7  Transaminitis-possible related to preeclampsia  8  Thrombocytopenia     *-*-*-*-*-*-*-*-*-*-*-*-*-*-*-*-*-*-*-*-*-*-*-*-*-*-*-*-*-*-*-*-*-*-*-*-*-*-*-*-*-*-*-*-*-*-*-*-*-*-*-*-*-*-    CURRENT SCHEDULED MEDICATIONS:    Current Facility-Administered Medications:   •  benzocaine-menthol-lanolin-aloe (DERMOPLAST) 20-0 5 % topical spray 1 application  , 1 application  , Topical, Q6H PRN, Kassandra Matute MD  •  calcium carbonate (TUMS) chewable tablet 1,000 mg, 1,000 mg, Oral, Daily PRN, Kassandra Matute MD  •  diphenhydrAMINE (BENADRYL) tablet 25 mg, 25 mg, Oral, Q6H PRN, Kassandra Matute MD  •  docusate sodium (COLACE) capsule 100 mg, 100 mg, Oral, BID, Kassandra Matute MD, 100 mg at 23  •  enoxaparin (LOVENOX) subcutaneous injection 40 mg, 40 mg, Subcutaneous, Q24H Angella Mora MD, 40 mg at 23  •  hydrALAZINE (APRESOLINE) tablet 25 mg, 25 mg, Oral, Q8H [address], Randell Castrejon MD  •  hydrocortisone 1 % cream 1 application  , 1 application  , Topical, Daily PRN, Kassandra Matute MD  •  [] ketorolac (TORADOL) injection 15 mg, 15 mg, Intravenous, Q6H PRN, 15 mg at 05/06/23 0224 **FOLLOWED BY** ibuprofen (MOTRIN) tablet 600 mg, 600 mg, Oral, Q6H [facility], Rupesh Del Toro MD, 600 mg at 05/08/23 0616  •  NIFEdipine (PROCARDIA XL) 24 hr tablet 90 mg, 90 mg, Oral, Daily, Emilia Zamora MD, 90 mg at 05/08/23 0906  •  ondansetron (ZOFRAN) injection 4 mg, 4 mg, Intravenous, Q8H PRN, Velasquez Cruz MD  •  oxyCODONE (ROXICODONE) IR tablet 5 mg, 5 mg, Oral, Q4H PRN **OR** oxyCODONE (ROXICODONE) immediate release tablet 10 mg, 10 mg, Oral, Q4H PRN, Velasquez Cruz MD  •  simethicone (MYLICON) chewable tablet 80 mg, 80 mg, Oral, 4x Daily PRN, Velasquez Cruz MD  •  sod citrate-citric acid (BICITRA) oral solution 30 mL, 30 mL, Oral, 4x Daily PRN, Velasquez Cruz MD  •  witch hazel-glycerin (TUCKS) topical pad 1 pad , 1 pad , Topical, Q4H PRN, Velasquez Cruz MD    PERTINENT LABS AND OTHER INVESTIGATIONS: Sodium 135 potassium 4 3 chloride 105 bicarb 25 BUN 18 creatinine 0 63 corrected calcium 7 8 hemoglobin 9 7 hematocrit 28 6 platelet count 387    ECHOCARDIOGRAM AND OTHER CARDIAC TESTS RESULTS:   Echocardiogram 5/5/2023  · Mildly increased left ventricular wall thickness, normal left ventricular systolic and diastolic function of the left ventricle ejection fraction is estimated as around 60%  · Normal right ventricle size and systolic function  · Normal left and right atrial size  · Normal aortic valve, no arctic valve stenosis or regurgitation  · Mitral valve is not well-visualized  No mitral valve stenosis or regurgitation noted  · Trace tricuspid valve regurgitation  · Very mild pulmonic valve stenosis  Mild pulmonic valve regurgitation  · No obvious pulmonary hypertension  · Trace, hemodynamically insignificant circumferential pericardial effusion    · Collapsed inferior vena cava suggesting low intravascular volume  *-*-*-*-*-*-*-*-*-*-*-*-*-*-*-*-*-*-*-*-*-*-*-*-*-*-*-*-*-*-*-*-*-*-*-*-*-*-*-*-*-*-*-*-*-*-*-*-*-*-*-*-*-*-  Blood pressures still elevated despite escalating dose of Procardia XL  Will need second agent antihypertensive therapy  PLAN:  -- Recommend adding metoprolol tart tolerated 25 mg twice daily  Continue Procardia XL at 90 mg daily  Addition of hydralazine can be considered if blood pressure remains uncontrolled  Recommended goal for blood pressure would be around 130/80 mmHg  -- Recommend 1 dose of furosemide 20 mg p o  today  -- Continued low-salt diet and other precautions to lower blood pressure  -- Serial monitoring of liver function tests and continued postpartum care  *-*-*-*-*-*-*-*-*-*-*-*-*-*-*-*-*-*-*-*-*-*-*-*-*-*-*-*-*-*-*-*-*-*-*-*-*-*-*-*-*-*-*-*-*-*-*-*-*-*-*-*-*-*-  INTERVAL CHANGES / HISTORY OF PRESENT ILLNESS:   Interval developments noted  Patient continues to have elevated blood pressures  Procardia dose has been escalated  Patient denies chest pain shortness of breath dizziness or palpitations  Denies headache or visual symptoms  *-*-*-*-*-*-*-*-*-*-*-*-*-*-*-*-*-*-*-*-*-*-*-*-*-*-*-*-*-*-*-*-*-*-*-*-*-*-*-*-*-*-*-*-*-*-*-*-*-*-*-*-*-*    PERTINENT REVIEW OF SYMPTOMS: As noted above in HPI    *-*-*-*-*-*-*-*-*-*-*-*-*-*-*-*-*-*-*-*-*-*-*-*-*-*-*-*-*-*-*-*-*-*-*-*-*-*-*-*-*-*-*-*-*-*-*-*-*-*-*-*-*-*-  VITAL SIGNS:  Vitals:    23 0645 23 0710 23 0735 23 0840   BP: 154/82 148/90 154/87 148/82   BP Location: Left arm Left arm Left arm Left arm   Pulse:  84     Resp:  16     Temp:  98 7 °F (37 1 °C)     TempSrc:  Oral     SpO2:       Weight:       Height:          Temp (24hrs), Av 7 °F (37 1 °C), Min:98 7 °F (37 1 °C), Max:98 7 °F (37 1 °C)  Current: Temperature: 98 7 °F (37 1 °C)      Weight    23 1800 23 0847   Weight: 89 4 kg (197 lb) 89 4 kg (197 lb)      Body mass index is 34 9 kg/m²    Body surface area is 1 92 meters squared  Wt Readings from Last 3 Encounters:   05/05/23 89 4 kg (197 lb)   05/04/23 89 4 kg (197 lb)   04/28/23 89 4 kg (197 lb 1 6 oz)    No intake or output data in the 24 hours ending 05/08/23 0944       *-*-*-*-*-*-*-*-*-*-*-*-*-*-*-*-*-*-*-*-*-*-*-*-*-*-*-*-*-*-*-*-*-*-*-*-*-*-*-*-*-*-*-*-*-*-*-*-*-*-*-*-*-*-  PHYSICAL EXAM:  General Appearance:    Alert, cooperative, in no respiratory distress, appears stated age, slightly overweight   Head, Eyes, ENT:     Mild facial edema  , moist mucous mebranes  Neck:   Supple, no carotid bruit or JVD   Back:     Symmetric, no curvature  Lungs:     Respirations unlabored  Clear to auscultation bilaterally,    Chest wall:    No tenderness or deformity   Heart:    Regular rate and rhythm, S1 and S2 normal, no murmur, rub  or gallop  Abdomen:      Abdomen not examined   Extremities:   Extremities warm, trace lower extremity edema,    Skin:   Skin color, texture, turgor normal, no rashes or lesions     *-*-*-*-*-*-*-*-*-*-*-*-*-*-*-*-*-*-*-*-*-*-*-*-*-*-*-*-*-*-*-*-*-*-*-*-*-*-*-*-*-*-*-*-*-*-*-*-*-*-*-*-*-*-  TELEMETRY, LAST ECG:  Telemetry reviewed    Not on telemetry     Results for orders placed or performed during the hospital encounter of 05/04/23   ECG 12 lead   Result Value    Ventricular Rate 58    Atrial Rate 58    WV Interval 124    QRSD Interval 82    QT Interval 474    QTC Interval 465    P Axis 36    QRS Axis 74    T Wave Axis 43    Narrative    Sinus bradycardia  Otherwise normal ECG  No previous ECGs available  Confirmed by Yamilka Welch ([id number]) on 5/5/2023 5:18:25 AM        *-*-*-*-*-*-*-*-*-*-*-*-*-*-*-*-*-*-*-*-*-*-*-*-*-*-*-*-*-*-*-*-*-*-*-*-*-*-*-*-*-*-*-*-*-*-*-*-*-*-*-*-*-*  LABORATORY DATA:  I have personally reviewed pertinent labs      CMP:  Results from last 7 days   Lab Units 05/08/23  0746 05/07/23  1920 05/06/23  0756   SODIUM mmol/L 135 135 135   POTASSIUM mmol/L 4 0 4 5 4 3   CHLORIDE mmol/L 103 104 105   CO2 mmol/L 21 22 25   BUN mg/dL 15 17 18   CREATININE mg/dL 0 54* 0 66 0 63   CALCIUM mg/dL 9 3 9 2 7 0*   ALK PHOS U/L 100 101 102   ALT U/L 326* 178* 35   AST U/L 222* 141* 24        Results from last 7 days   Lab Units 23  0803 23  0157   MAGNESIUM mg/dL 6 3* 5 9* 5 4*        Cardiac Profile:       Invalid input(s): CK, CKMBP                 Arterial Blood Gas Analysis:    Venous Blood Gas Analysis:       Invalid input(s): PCOVEN     CBC:   Results from last 7 days   Lab Units 23  0746 23  19223  0756   WBC Thousand/uL 12 32* 13 60* 15 90*   HEMOGLOBIN g/dL 10 6* 10 2* 9 7*   HEMATOCRIT % 32 5* 30 8* 28 6*   PLATELETS Thousands/uL 167 143* 135*     PT/INR: No results found for: PT, INR, Microbiology:        *-*-*-*-*-*-*-*-*-*-*-*-*-*-*-*-*-*-*-*-*-*-*-*-*-*-*-*-*-*-*-*-*-*-*-*-*-*-*-*-*-*-*-*-*-*-*-*-*-*-*-*-*-*-  IMAGING STUDIES REPORTS: Imaging studies results reviewed    No valid procedures specified  No Chest XR results available for this patient  *-*-*-*-*-*-*-*-*-*-*-*-*-*-*-*-*-*-*-*-*-*-*-*-*-*-*-*-*-*-*-*-*-*-*-*-*-*-*-*-*-*-*-*-*-*-*-*-*-*-*-*-*-*-  AVAILABLE OLD CARDIAC TESTS REPORTS:   Results for orders placed during the hospital encounter of 19    Echo complete with contrast if indicated    Narrative  [facility] [address]  [address]    Transthoracic Echocardiogram  2D, M-mode, Doppler, and Color Doppler    Study date:  01-Aug-2019    Patient: Fern Liu  MR number: [id number]  Account number: [de-identified]  : 2000  Age: 22 years  Gender: Female  Status: Outpatient  Location: [place] [facility]  Height: 63 in  Weight: 154 7 lb  BP: 102/ 70 mmHg    Indications: Murmur      Diagnoses: R01 1 - Cardiac murmur, unspecified    Sonographer:  JEFFERSON Bermudez  Referring Physician:  Theresa Hunt MD  Group:  [facility]  Interpreting Physician:  Karine Juarez MD    SUMMARY    LEFT VENTRICLE:  Normal left ventricular systolic function, EF 10%  Normal left ventricular cavity size  Normal left ventricular wall thickness  Normal left ventricular wall motion without regional wall motion abnormalities  Normal left ventricular  diastolic function and normal left atrial pressures  TRICUSPID VALVE:  There was trace regurgitation  PULMONIC VALVE:  Mild pulmonic stenosis  Pulmonary artery velocity 2 4 m/sec with a peak gradient of 22 mmHg  Mild-to-moderate pulmonic regurgitation  HISTORY: PRIOR HISTORY: Pulmonic stenosis    PROCEDURE: The study was performed in the [facility]  This was a routine study  The transthoracic approach was used  The study included complete 2D imaging, M-mode, complete spectral Doppler, and color Doppler  The heart rate was 70  bpm, at the start of the study  Images were obtained from the parasternal, apical, subcostal, and suprasternal notch acoustic windows  Image quality was adequate  LEFT VENTRICLE: Normal left ventricular systolic function, EF 33%  Normal left ventricular cavity size  Normal left ventricular wall thickness  Normal left ventricular wall motion without regional wall motion abnormalities  Normal left  ventricular diastolic function and normal left atrial pressures  RIGHT VENTRICLE: The size was normal  Systolic function was normal  Wall thickness was normal     LEFT ATRIUM: Size was normal     RIGHT ATRIUM: Size was normal     MITRAL VALVE: Valve structure was normal  There was normal leaflet separation  DOPPLER: The transmitral velocity was within the normal range  There was no evidence for stenosis  There was no regurgitation  AORTIC VALVE: The valve was trileaflet  Leaflets exhibited normal thickness and normal cuspal separation  DOPPLER: Transaortic velocity was within the normal range  There was no evidence for stenosis  There was no regurgitation  TRICUSPID VALVE: The valve structure was normal  There was normal leaflet separation   DOPPLER: The transtricuspid velocity was within the normal range  There was no evidence for stenosis  There was trace regurgitation  PULMONIC VALVE: Mild pulmonic stenosis  Pulmonary artery velocity 2 4 m/sec with a peak gradient of 22 mmHg  Mild-to-moderate pulmonic regurgitation  PERICARDIUM: There was no pericardial effusion  The pericardium was normal in appearance  AORTA: The root exhibited normal size  PULMONARY ARTERY: The size was normal  DOPPLER: Systolic pressure was within the normal range  SYSTEM MEASUREMENT TABLES    2D  %FS: 39 59 %  Ao Diam: 2 37 cm  EDV(Teich): 95 31 ml  EF(Teich): 70 23 %  ESV(Teich): 28 37 ml  IVSd: 0 94 cm  LA Area: 14 3 cm2  LA Diam: 3 28 cm  LVEDV MOD A4C: 78 58 ml  LVEF MOD A4C: 64 41 %  LVESV MOD A4C: 27 96 ml  LVIDd: 4 56 cm  LVIDs: 2 75 cm  LVLd A4C: 9 35 cm  LVLs A4C: 6 83 cm  LVPWd: 0 88 cm  RA Area: 11 46 cm2  RVIDd: 2 86 cm  SV MOD A4C: 50 61 ml  SV(Teich): 66 94 ml    CW  PV Vmax: 2 36 m/s  PV maxP 4 mmHg  TR Vmax: 2 36 m/s  TR maxP 35 mmHg    MM  TAPSE: 2 39 cm    PW  E': 0 15 m/s  E/E': 5 7  MV A Himanshu: 0 61 m/s  MV Dec Grafton: 2 84 m/s2  MV DecT: 303 06 ms  MV E Himanshu: 0 86 m/s  MV E/A Ratio: 1 4  MV PHT: 87 89 ms  MVA By PHT: 2 5 cm2    [facility] Accredited Echocardi[facility] Laboratory    Prepared and electronically signed by    Esmer Gauthier MD  Signed 02-Aug-2019 10:44:42    No results found for this or any previous visit  No results found for this or any previous visit  No results found for this or any previous visit         *-*-*-*-*-*-*-*-*-*-*-*-*-*-*-*-*-*-*-*-*-*-*-*-*-*-*-*-*-*-*-*-*-*-*-*-*-*-*-*-*-*-*-*-*-*-*-*-*-*-*-*-*-*-  SIGNATURES:   [unfilled]   Adonis Mccall MD

## 2023-05-08 NOTE — LACTATION NOTE
This note was copied from a baby's chart  Mother verbalized breast/bottle feeding is going better  Denies need for assistance  Dad at bedside  Verbal review of  positioning infant up at chest level and starting to feed infant with nose arriving at the nipple  Then, using areolar compression to achieve a deep latch that is comfortable and exchanges optimum amounts of milk  Discussed risks for early supplementation: over feeding, longer digestion times, engorgement for mom, lower milk supply for mom, and nipple confusion  Benefits of breast feeding for infant's intestinal tract, less engorgement for mom, protection from multiple disease processes as infant develops, avoidance of over feeding for infant, less nipple confusion, and increased health benefits for mom  Instructions reviewed of hands on pumping  Discussed when to start, frequency, different pumps available versus manual expression  Discussed hygiene of hands and supplies as well as assembly, placement of flanges, size of flanged, preparing the breast and cycles and suction settings on pump  Discussed labeling of milk, storage, and preparation of stored milk  Enc to call for assistance as needed,phone # given

## 2023-05-08 NOTE — PROGRESS NOTES
Spoke to patient in regards to blood pressure control in the setting of Preeclampsia w/SF and known cardiac history  Per cards, plan is to add metoprolol 25mg BID to her current regimen of procardia xl 90mg qD  She is amenable to this medication change and is currently asymptomatic  Explained that we will also continue to trend her LFTs given the most recent rise  In regards to contraception, she is interested in the IUD, but declined a bridge       Angela Kirkland MD  OBGYN PGY-2  05/08/23 10:52 AM

## 2023-05-08 NOTE — PLAN OF CARE

## 2023-05-08 NOTE — PROGRESS NOTES
Progress Note - OB/GYN  Cruz Dillon 20 y o  female MRN: [id number]  Unit/Bed#: L&D 308-01 Encounter: [id number]    Assessment and Plan     Cruz Dillon is a patient of: [facility]  She is PPD# 1 s/p  primary  section, low transverse incision  Recovering well and is stable       Thrombocytopenia affecting pregnancy, antepartum ([address] )  Assessment & Plan  Platelets [id number] since admission, stable  · No further labs indicated    Pre-eclampsia in third trimester  Assessment & Plan  Diagnosed with preeclampsia with severe features after sustained SRBP on arrival to L&D  Required acute IV treatment intrapartum with Labetalol 20/40/40/80 mg IV and Hydralazine 5/5/10 mg IV on 23 prior to delivery  Started on Procardia XL 30 mg daily on 23 prior to delivery  Procardia XL increased from 30 mg to 60 mg daily 23 due to elevated BP  S/p 24 hr postpartum magnesium for seizure ppx  Hgb 12 5 (admission) --> 13 0 (immediately post-op) --> stable on subsequent labs  Plt 137 (admission) --> 153 (immediately post-op) --> stable on subsequent labs  UP:C 0 61  CMP showed AST/ALT of 141/179 on   Asympotmatic  Systolic (28NUJ), WKR:370 , Min:132 , URX:997   Diastolic (90XWS), FWD:15, Min:52, Max:117    · Monitor BP  · Plan to increase BP medication this morning  · Monitor for signs/symptoms of worsening preeclampsia    Anemia during pregnancy in third trimester  Assessment & Plan  Admission Hgb 12 5  Congenital pulmonary stenosis  Assessment & Plan  Last maternal echo  with mild stenosis and mild to moderate regurgitation  Fetal cardiology - echo is normal - by peds Dr Antony Hickman  Cardiology contacted via phone regarding recommendations for  versus trial of labor and recommendations for antihypertensives    Per on call cardiologist -no contraindication against attempted vaginal delivery or  at this time both labetalol and hydralazine are okay to use for "antihypertensive  Repeat echo 23 largely stable per cardiology with no acute findings  * Status post primary low transverse  section  Assessment & Plan   mL  Hgb 12 5 --> 13 0 --> 11 8  Pain well controlled  Voiding spontaneously  Appropriate post-operative bowel function  Tolerating PO fluids and solids  Breastfeeding  Lochia wnl  • Continue routine postpartum care  • Postpartum DVT ppx with SCDs and Lovenox 40 mg daily (BMI 34)      Disposition    - Anticipate discharge home on PPD# 5-6, pending BP management      Subjective/Objective     Chief Complaint: Postpartum State     Subjective:    Jose Ramon is PPD/POD#4 s/p  primary  section, low transverse incisionfor preeclampsia with severe features which was then done STAT for fetal heart tones, now with elevated BP postpartum requiring PO BP medication titration  She has no current complaints  Pain is well controlled  Patient is currently voiding  She is ambulating  Patient is currently passing flatus and has had bowel movement  She is tolerating PO, and denies nausea or vomitting  Patient denies fever, chills, chest pain, shortness of breath, or calf tenderness  Lochia is normal  She is  Breastfeeding  She is recovering well and is stable    She denies headache, vision changes, chest pain, SOB, RUQ/epigastric pain, or increased swelling      Vitals:   /52   Pulse 77   Temp 98 7 °F (37 1 °C) (Oral)   Resp 20   Ht 5' 3\" (1 6 m)   Wt 89 4 kg (197 lb)   LMP 2022   SpO2 98%   Breastfeeding Yes   BMI 34 90 kg/m²     No intake or output data in the 24 hours ending 23 0612    Invasive Devices     Peripheral Intravenous Line  Duration           Peripheral IV 23 Right Antecubital <1 day                Physical Exam:   GEN: Jose Ramon appears well, alert and oriented x 3, pleasant and cooperative   CARDIO: RRR, no murmurs or rubs  RESP:  CTAB, no wheezes or rales  ABDOMEN: soft, no tenderness, no " distention, fundus @ umbilicus, Incision C/D/I  EXTREMITIES: SCDs on, non tender, no erythema    Labs:     Hemoglobin   Date Value Ref Range Status   05/07/2023 10 2 (L) 11 5 - 15 4 g/dL Final   05/06/2023 9 7 (L) 11 5 - 15 4 g/dL Final     WBC   Date Value Ref Range Status   05/07/2023 13 60 (H) 4 31 - 10 16 Thousand/uL Final   05/06/2023 15 90 (H) 4 31 - 10 16 Thousand/uL Final     Platelets   Date Value Ref Range Status   05/07/2023 143 (L) 149 - 390 Thousands/uL Final   05/06/2023 135 (L) 149 - 390 Thousands/uL Final     Creatinine   Date Value Ref Range Status   05/07/2023 0 66 0 60 - 1 30 mg/dL Final     Comment:     Standardized to IDMS reference method   05/06/2023 0 63 0 60 - 1 30 mg/dL Final     Comment:     Standardized to IDMS reference method     AST   Date Value Ref Range Status   05/07/2023 141 (H) 13 - 39 U/L Final   05/06/2023 24 13 - 39 U/L Final     ALT   Date Value Ref Range Status   05/07/2023 178 (H) 7 - 52 U/L Final     Comment:     Specimen collection should occur prior to Sulfasalazine administration due to the potential for falsely depressed results  05/06/2023 35 7 - 52 U/L Final     Comment:     Specimen collection should occur prior to Sulfasalazine administration due to the potential for falsely depressed results             Mayur Multani DO  5/8/2023  6:12 AM

## 2023-05-09 VITALS
BODY MASS INDEX: 34.91 KG/M2 | RESPIRATION RATE: 16 BRPM | HEART RATE: 72 BPM | SYSTOLIC BLOOD PRESSURE: 157 MMHG | DIASTOLIC BLOOD PRESSURE: 95 MMHG | OXYGEN SATURATION: 98 % | HEIGHT: 63 IN | WEIGHT: 197 LBS | TEMPERATURE: 98.6 F

## 2023-05-09 LAB
ALBUMIN SERPL BCP-MCNC: 3.4 G/DL (ref 3.5–5)
ALP SERPL-CCNC: 95 U/L (ref 34–104)
ALT SERPL W P-5'-P-CCNC: 314 U/L (ref 7–52)
ANION GAP SERPL CALCULATED.3IONS-SCNC: 8 MMOL/L (ref 4–13)
AST SERPL W P-5'-P-CCNC: 125 U/L (ref 13–39)
BILIRUB SERPL-MCNC: 0.29 MG/DL (ref 0.2–1)
BUN SERPL-MCNC: 20 MG/DL (ref 5–25)
CALCIUM ALBUM COR SERPL-MCNC: 9.9 MG/DL (ref 8.3–10.1)
CALCIUM SERPL-MCNC: 9.4 MG/DL (ref 8.4–10.2)
CHLORIDE SERPL-SCNC: 105 MMOL/L (ref 96–108)
CO2 SERPL-SCNC: 23 MMOL/L (ref 21–32)
CREAT SERPL-MCNC: 0.56 MG/DL (ref 0.6–1.3)
ERYTHROCYTE [DISTWIDTH] IN BLOOD BY AUTOMATED COUNT: 13.1 % (ref 11.6–15.1)
GFR SERPL CREATININE-BSD FRML MDRD: 131 ML/MIN/1.73SQ M
GLUCOSE SERPL-MCNC: 87 MG/DL (ref 65–140)
HCT VFR BLD AUTO: 32.2 % (ref 34.8–46.1)
HGB BLD-MCNC: 10.8 G/DL (ref 11.5–15.4)
MCH RBC QN AUTO: 32 PG (ref 26.8–34.3)
MCHC RBC AUTO-ENTMCNC: 33.5 G/DL (ref 31.4–37.4)
MCV RBC AUTO: 96 FL (ref 82–98)
PLATELET # BLD AUTO: 167 THOUSANDS/UL (ref 149–390)
PMV BLD AUTO: 11.5 FL (ref 8.9–12.7)
POTASSIUM SERPL-SCNC: 4.4 MMOL/L (ref 3.5–5.3)
PROT SERPL-MCNC: 6.5 G/DL (ref 6.4–8.4)
RBC # BLD AUTO: 3.37 MILLION/UL (ref 3.81–5.12)
SODIUM SERPL-SCNC: 136 MMOL/L (ref 135–147)
T4 FREE SERPL-MCNC: 1.09 NG/DL (ref 0.76–1.46)
TSH SERPL DL<=0.05 MIU/L-ACNC: 2.16 UIU/ML (ref 0.45–4.5)
WBC # BLD AUTO: 12.93 THOUSAND/UL (ref 4.31–10.16)

## 2023-05-09 RX ORDER — HYDRALAZINE HYDROCHLORIDE 25 MG/1
25 TABLET, FILM COATED ORAL EVERY 8 HOURS SCHEDULED
Status: DISCONTINUED | OUTPATIENT
Start: 2023-05-09 | End: 2023-05-09 | Stop reason: HOSPADM

## 2023-05-09 RX ORDER — NIFEDIPINE 30 MG/1
120 TABLET, EXTENDED RELEASE ORAL DAILY
Status: DISCONTINUED | OUTPATIENT
Start: 2023-05-09 | End: 2023-05-09 | Stop reason: HOSPADM

## 2023-05-09 RX ORDER — LABETALOL 100 MG/1
100 TABLET, FILM COATED ORAL EVERY 12 HOURS SCHEDULED
Status: DISCONTINUED | OUTPATIENT
Start: 2023-05-09 | End: 2023-05-09 | Stop reason: HOSPADM

## 2023-05-09 RX ORDER — IBUPROFEN 600 MG/1
600 TABLET ORAL EVERY 6 HOURS SCHEDULED
Qty: 50 TABLET | Refills: 0 | Status: SHIPPED | OUTPATIENT
Start: 2023-05-09 | End: 2023-05-18

## 2023-05-09 RX ORDER — HYDRALAZINE HYDROCHLORIDE 20 MG/ML
10 INJECTION INTRAMUSCULAR; INTRAVENOUS ONCE
Status: COMPLETED | OUTPATIENT
Start: 2023-05-09 | End: 2023-05-09

## 2023-05-09 RX ORDER — HYDRALAZINE HYDROCHLORIDE 20 MG/ML
5 INJECTION INTRAMUSCULAR; INTRAVENOUS ONCE AS NEEDED
Status: DISCONTINUED | OUTPATIENT
Start: 2023-05-09 | End: 2023-05-09

## 2023-05-09 RX ORDER — LABETALOL 100 MG/1
100 TABLET, FILM COATED ORAL EVERY 12 HOURS SCHEDULED
Qty: 60 TABLET | Refills: 3 | Status: SHIPPED | OUTPATIENT
Start: 2023-05-09

## 2023-05-09 RX ORDER — HYDRALAZINE HYDROCHLORIDE 25 MG/1
25 TABLET, FILM COATED ORAL 3 TIMES DAILY
Qty: 90 TABLET | Refills: 2 | Status: SHIPPED | OUTPATIENT
Start: 2023-05-09

## 2023-05-09 RX ORDER — DOCUSATE SODIUM 100 MG/1
100 CAPSULE, LIQUID FILLED ORAL 2 TIMES DAILY
Qty: 60 CAPSULE | Refills: 3 | Status: SHIPPED | OUTPATIENT
Start: 2023-05-09

## 2023-05-09 RX ORDER — NIFEDIPINE 30 MG/1
60 TABLET, EXTENDED RELEASE ORAL DAILY
Qty: 60 TABLET | Refills: 3 | Status: SHIPPED | OUTPATIENT
Start: 2023-05-10

## 2023-05-09 RX ADMIN — HYDRALAZINE HYDROCHLORIDE 10 MG: 20 INJECTION, SOLUTION INTRAMUSCULAR; INTRAVENOUS at 04:22

## 2023-05-09 RX ADMIN — IBUPROFEN 600 MG: 600 TABLET, FILM COATED ORAL at 14:09

## 2023-05-09 RX ADMIN — HYDRALAZINE HYDROCHLORIDE 10 MG: 20 INJECTION INTRAMUSCULAR; INTRAVENOUS at 07:09

## 2023-05-09 RX ADMIN — NIFEDIPINE 120 MG: 30 TABLET, FILM COATED, EXTENDED RELEASE ORAL at 08:29

## 2023-05-09 RX ADMIN — HYDRALAZINE HYDROCHLORIDE 25 MG: 25 TABLET, FILM COATED ORAL at 11:14

## 2023-05-09 RX ADMIN — METOPROLOL TARTRATE 25 MG: 25 TABLET, FILM COATED ORAL at 08:29

## 2023-05-09 RX ADMIN — ENOXAPARIN SODIUM 40 MG: 100 INJECTION SUBCUTANEOUS at 08:29

## 2023-05-09 NOTE — PROGRESS NOTES
Progress Note - OB/GYN  Emilio Delgadillo 20 y o  female MRN: [id number]  Unit/Bed#: L&D 308-01 Encounter: [id number]    Assessment and Plan     Emilio Delgadillo is a patient of: [facility]  She is PPD# 6 s/p  primary  section, low transverse incision  Recovering well and is stable       Thrombocytopenia affecting pregnancy, antepartum ([address] )  Assessment & Plan  Platelets [id number] since admission, stable  Pre-eclampsia in third trimester  Assessment & Plan  Diagnosed with preeclampsia with severe features after sustained SRBP on arrival to L&D  Required acute IV treatment intrapartum with Labetalol 20/40/40/80 mg IV and Hydralazine 5/5/10 mg IV on 23 prior to delivery  Started on Procardia XL 30 mg daily on 23 prior to delivery  Procardia XL increased from 30 mg to 60 mg daily 23 due to elevated BP  S/p 24 hr postpartum magnesium for seizure ppx  Hgb 12 5 (admission) --> 13 0 (immediately post-op) --> stable on subsequent labs  Plt 137 (admission) --> 153 (immediately post-op) --> stable on subsequent labs  UP:C 0 61  CMP showed AST/ALT of 141/179 on   Increase of LFT on   -> 222/326   -> 233/389, tylenol discontinued   AM /314  Asympotmatic  Systolic (66IUZ), LVD:638 , Min:141 , VFB:012   Diastolic (73WXU), EBR:19, Min:64, Max:110    Last SRBP @ 0345 on     · Monitor BP  · : Plan to increase BP medication this morning to Procardia 120 mg XL qd this AM  Continue Metoprolol 25mg BID per cardiology recommendations  · Monitor for signs/symptoms of worsening preeclampsia    Anemia during pregnancy in third trimester  Assessment & Plan  Admission Hgb 12 5  Congenital pulmonary stenosis  Assessment & Plan  Last maternal echo  with mild stenosis and mild to moderate regurgitation  Fetal cardiology - echo is normal - by maninder Mckoy    Cardiology contacted via phone regarding recommendations for  versus trial of labor and recommendations for "antihypertensives  Per on call cardiologist -no contraindication against attempted vaginal delivery or  at this time both labetalol and hydralazine are okay to use for antihypertensive  Repeat echo 23 largely stable per cardiology with no acute findings  * Status post primary low transverse  section  Assessment & Plan   mL  Hgb 12 5 --> 13 0 --> 11 8  Pain well controlled  Voiding spontaneously  Appropriate post-operative bowel function  Tolerating PO fluids and solids  Breastfeeding  Lochia wnl  • Continue routine postpartum care  • Postpartum DVT ppx with SCDs and Lovenox 40 mg daily (BMI 34)      Disposition    - Anticipate discharge home on PPD# 7-8, pending BP Control      Subjective/Objective     Chief Complaint: Postpartum State     Subjective:    Lorie Dow is PPD/POD#6 s/p  primary  section, low transverse incision  She has no current complaints  Pain is well controlled  Patient is currently voiding  She is ambulating  Patient is currently passing flatus and has had bowel movement  She is tolerating PO, and denies nausea or vomitting  Patient denies fever, chills, chest pain, shortness of breath, or calf tenderness  Lochia is normal  She is  Breastfeeding  She is recovering well and is stable  She denies any headache, vision changes, chest pain, shortness of breath, RUQ pain, or significant extremity swelling      Vitals:   /86 (BP Location: Left arm)   Pulse 95   Temp 98 3 °F (36 8 °C) (Oral)   Resp 18   Ht 5' 3\" (1 6 m)   Wt 89 4 kg (197 lb)   LMP 2022   SpO2 98%   Breastfeeding Yes   BMI 34 90 kg/m²     No intake or output data in the 24 hours ending 23 0619    Invasive Devices     Peripheral Intravenous Line  Duration           Peripheral IV 23 Right Antecubital 1 day                Physical Exam:   GEN: Lorie Dow appears well, alert and oriented x 3, pleasant and cooperative   CARDIO: RRR, no murmurs or " rubs  RESP:  CTAB, no wheezes or rales  ABDOMEN: soft, no tenderness, no distention, fundus @ u-1, Incision C/D/I  EXTREMITIES: SCDs on, non tender, no erythema, b/l Emily's sign negative      Labs:     Hemoglobin   Date Value Ref Range Status   05/09/2023 10 8 (L) 11 5 - 15 4 g/dL Final   05/08/2023 10 6 (L) 11 5 - 15 4 g/dL Final     WBC   Date Value Ref Range Status   05/09/2023 12 93 (H) 4 31 - 10 16 Thousand/uL Final   05/08/2023 12 64 (H) 4 31 - 10 16 Thousand/uL Final     Platelets   Date Value Ref Range Status   05/09/2023 167 149 - 390 Thousands/uL Final   05/08/2023 179 149 - 390 Thousands/uL Final     Creatinine   Date Value Ref Range Status   05/09/2023 0 56 (L) 0 60 - 1 30 mg/dL Final     Comment:     Standardized to IDMS reference method   05/08/2023 0 75 0 60 - 1 30 mg/dL Final     Comment:     Standardized to IDMS reference method     AST   Date Value Ref Range Status   05/09/2023 125 (H) 13 - 39 U/L Final   05/08/2023 233 (H) 13 - 39 U/L Final     ALT   Date Value Ref Range Status   05/09/2023 314 (H) 7 - 52 U/L Final     Comment:     Specimen collection should occur prior to Sulfasalazine administration due to the potential for falsely depressed results  05/08/2023 389 (H) 7 - 52 U/L Final     Comment:     Specimen collection should occur prior to Sulfasalazine administration due to the potential for falsely depressed results             Magdalene Bhatia DO  5/9/2023  6:19 AM

## 2023-05-09 NOTE — PROGRESS NOTES
Spoke with patient at length in regards to potential discharge later today and blood pressure control  Patient would really like to be discharged as she has been inpatient for 1 week  I explained her new blood pressure regimen of Procardia 120mg qD, Labetalol 100mg BID, and hydralazine 25mg TID  She is in agreement with staying compliant with her regimen  I explained that she may be able to have a potential late discharge with close outpatient follow up  She is hopeful for this option as she believes her mental health is being affected by the frequent blood pressure monitoring  Patient does not have a history of mental health diagnoses and declined medication at this time  Will discuss with Dr Thalia Ozuna regarding patient discharge and outpatient follow up       [address], MD  OBGYN PGY-2  05/09/23 9:44 AM

## 2023-05-09 NOTE — PROGRESS NOTES
CARDIOLOGY INPATIENT FOLLOW-UP PROGRESS NOTE  *-*-*-*-*-*-*-*-*-*-*-*-*-*-*-*-*-*-*-*-*-*-*-*-*-*-*-*-*-*-*-*-*-*-*-*-*-*-*-*-*-*-*-*-*-*-*-*-*-*-*-*-*-*-  QGQFLUSIL DATE: 23 9:01 AM   AUTHOR: Kit Reeves MD  PATIENT: Oscar Sheehan   2000    [id number]   46 y o    female  INPATIENT HOSPITALIST PHYSICIAN: Jessee Webb*  DATE OF ADMISSION: 2023  5:10 PM; LENGTH OF STAY: 5 days  *-*-*-*-*-*-*-*-*-*-*-*-*-*-*-*-*-*-*-*-*-*-*-*-*-*-*-*-*-*-*-*-*-*-*-*-*-*-*-*-*-*-*-*-*-*-*-*-*-*-*-*-*-*-    CARDIOLOGY ASSESSMENT:  1  Preeclampsia with hypertension, without symptoms  ,  Minimal proteinuria  2  POD 5 s/p low transverse  section  3  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  4  Anemia during pregnancy  5  Borderline thrombocytopenia  6  Hypertensive heart disease without heart failure  7  Transaminitis-possible related to preeclampsia  8  Thrombocytopenia     *-*-*-*-*-*-*-*-*-*-*-*-*-*-*-*-*-*-*-*-*-*-*-*-*-*-*-*-*-*-*-*-*-*-*-*-*-*-*-*-*-*-*-*-*-*-*-*-*-*-*-*-*-*-    CURRENT SCHEDULED MEDICATIONS:    Current Facility-Administered Medications:   •  benzocaine-menthol-lanolin-aloe (DERMOPLAST) 20-0 5 % topical spray 1 application  , 1 application  , Topical, Q6H PRN, Radha Montes MD  •  calcium carbonate (TUMS) chewable tablet 1,000 mg, 1,000 mg, Oral, Daily PRN, Radha Montes MD  •  diphenhydrAMINE (BENADRYL) tablet 25 mg, 25 mg, Oral, Q6H PRN, Radha Montes MD  •  docusate sodium (COLACE) capsule 100 mg, 100 mg, Oral, BID, Radha Montes MD, 100 mg at 23 0844  •  enoxaparin (LOVENOX) subcutaneous injection 40 mg, 40 mg, Subcutaneous, Q24H Towana Bamberger, MD, 40 mg at 23 1695  •  hydrALAZINE (APRESOLINE) tablet 25 mg, 25 mg, Oral, Q8H [facility], Lela Hong MD  •  hydrocortisone 1 % cream 1 application  , 1 application  , Topical, Daily PRN, Radha Montes MD  •  [] ketorolac (TORADOL) injection 15 mg, 15 mg, Intravenous, Q6H PRN, 15 mg at 05/06/23 0224 **FOLLOWED BY** ibuprofen (MOTRIN) tablet 600 mg, 600 mg, Oral, Q6H [address], Susan Dickinson MD, 600 mg at 05/08/23 1139  •  labetalol (NORMODYNE) tablet 100 mg, 100 mg, Oral, Q12H [address], Lela Bettencourt MD  •  NIFEdipine (PROCARDIA XL) 24 hr tablet 120 mg, 120 mg, Oral, Daily, Katelyn Chino MD, 120 mg at 05/09/23 0829  •  ondansetron (ZOFRAN) injection 4 mg, 4 mg, Intravenous, Q8H PRN, Chris Montanez MD  •  oxyCODONE (ROXICODONE) IR tablet 5 mg, 5 mg, Oral, Q4H PRN **OR** oxyCODONE (ROXICODONE) immediate release tablet 10 mg, 10 mg, Oral, Q4H PRN, Chris Montanez MD  •  simethicone (MYLICON) chewable tablet 80 mg, 80 mg, Oral, 4x Daily PRN, Chris Montanez MD  •  sod citrate-citric acid (BICITRA) oral solution 30 mL, 30 mL, Oral, 4x Daily PRN, Chris Montanez MD  •  witch hazel-glycerin (TUCKS) topical pad 1 pad , 1 pad , Topical, Q4H PRN, Chris Montanez MD    PERTINENT LABS AND OTHER INVESTIGATIONS:   Sodium 136 potassium 4 4 chloride 105 bicarb 23 BUN 20 creatinine 0 56 creatinine 8 7   albumin 3 4 hemoglobin 10 8 hematocrit 32 2 WBC 12 93 platelet count 552  Labs 5/8/2023: Sodium 135 potassium 4 3 chloride 105 bicarb 25 BUN 18 creatinine 0 63 corrected calcium 7 8 hemoglobin 9 7 hematocrit 28 6 platelet count 409    ECHOCARDIOGRAM AND OTHER CARDIAC TESTS RESULTS:   Echocardiogram 5/5/2023  · Mildly increased left ventricular wall thickness, normal left ventricular systolic and diastolic function of the left ventricle ejection fraction is estimated as around 60%  · Normal right ventricle size and systolic function  · Normal left and right atrial size  · Normal aortic valve, no arctic valve stenosis or regurgitation  · Mitral valve is not well-visualized  No mitral valve stenosis or regurgitation noted  · Trace tricuspid valve regurgitation    · Very mild pulmonic valve stenosis  Mild pulmonic valve regurgitation  · No obvious pulmonary hypertension  · Trace, hemodynamically insignificant circumferential pericardial effusion  · Collapsed inferior vena cava suggesting low intravascular volume  *-*-*-*-*-*-*-*-*-*-*-*-*-*-*-*-*-*-*-*-*-*-*-*-*-*-*-*-*-*-*-*-*-*-*-*-*-*-*-*-*-*-*-*-*-*-*-*-*-*-*-*-*-*-  Patient has resistant hypertension despite multiple she reports significant anxiety of still remaining in the hospital  And this may be a contributing feature for elevated blood pressures  By history and by exam there is no definite evidence of renal parenchymal disease, renal artery stenosis  No symptoms that would suggest primary aldosteronism, Cushing syndrome, pheochromocytoma, thyroid disease or coarctation of aorta or sleep apnea  If blood pressure remains elevated will need formal evaluation for secondary hypertension  PLAN:  -- Recommend adding hydralazine at 25 mg 3 times daily  Agree with increasing the Procardia dose to 120 mg once daily  Recommend changing metoprolol to labetalol 100 mg twice daily  -- Added TSH and free T4 to the blood drawn earlier  -- Recommend repeat urine analysis for proteinuria, plasma aldosterone/renin ratio and cortisol with a m  labs  -- Will assess response to medication changes  If blood pressure improves to systolic less than 218 and diastolic less than 90 mmHg then it may be  okay to be discharged with the remainder titration of medications as an outpatient  If blood pressure remains elevated should also do Renal artery ultrasound  -- Continued low-salt diet and other precautions to lower blood pressure  -- Serial monitoring of liver function tests and continued postpartum care  *-*-*-*-*-*-*-*-*-*-*-*-*-*-*-*-*-*-*-*-*-*-*-*-*-*-*-*-*-*-*-*-*-*-*-*-*-*-*-*-*-*-*-*-*-*-*-*-*-*-*-*-*-*-  INTERVAL CHANGES / HISTORY OF PRESENT ILLNESS:   No acute events overnight    Patient denies chest pain shortness of breath or dizziness or palpitations  Blood pressures  little remain elevated  Patient is very distressed emotionally and agitated that she is not being allowed to go home  She says that she is bothered by disturbance every 15 minutes and she  feels confident that her blood pressure would be normal if she is at home  Has received multiple doses of hydralazine and Procardia dose has been escalated  *-*-*-*-*-*-*-*-*-*-*-*-*-*-*-*-*-*-*-*-*-*-*-*-*-*-*-*-*-*-*-*-*-*-*-*-*-*-*-*-*-*-*-*-*-*-*-*-*-*-*-*-*-*    PERTINENT REVIEW OF SYMPTOMS: As noted above in HPI    *-*-*-*-*-*-*-*-*-*-*-*-*-*-*-*-*-*-*-*-*-*-*-*-*-*-*-*-*-*-*-*-*-*-*-*-*-*-*-*-*-*-*-*-*-*-*-*-*-*-*-*-*-*-  VITAL SIGNS:  Vitals:    23 0800 23 0810 23 0825 23 0845   BP: (!) 149/103 149/95 152/92 163/99   BP Location: Left arm Left arm Left arm Left arm   Pulse: (!) 109  97    Resp: 16      Temp: 98 °F (36 7 °C)      TempSrc: Oral      SpO2: 97%      Weight:       Height:          Temp (24hrs), Av 2 °F (36 8 °C), Min:98 °F (36 7 °C), Max:98 3 °F (36 8 °C)  Current: Temperature: 98 °F (36 7 °C)      Weight    23 1800 23 0847   Weight: 89 4 kg (197 lb) 89 4 kg (197 lb)      Body mass index is 34 9 kg/m²  Body surface area is 1 92 meters squared  Wt Readings from Last 3 Encounters:   23 89 4 kg (197 lb)   23 89 4 kg (197 lb)   23 89 4 kg (197 lb 1 6 oz)    No intake or output data in the 24 hours ending 23 09       *-*-*-*-*-*-*-*-*-*-*-*-*-*-*-*-*-*-*-*-*-*-*-*-*-*-*-*-*-*-*-*-*-*-*-*-*-*-*-*-*-*-*-*-*-*-*-*-*-*-*-*-*-*-  PHYSICAL EXAM:  General Appearance:    Alert, cooperative, in no respiratory distress, appears emotionally distressed and crying  Slightly obese   Head, Eyes, ENT:     Mild facial edema  , moist mucous mebranes  Neck:   Supple, no carotid bruit or JVD   Back:     Symmetric, no curvature  Lungs:     Respirations unlabored   Clear to auscultation bilaterally,    Chest wall: No tenderness or deformity   Heart:    Regular rate and rhythm, S1 and S2 normal, no murmur, rub  or gallop  Abdomen:      Abdomen not examined   Extremities:   Extremities warm, trace lower extremity edema,    Skin:   Skin color, texture, turgor normal, no rashes or lesions     *-*-*-*-*-*-*-*-*-*-*-*-*-*-*-*-*-*-*-*-*-*-*-*-*-*-*-*-*-*-*-*-*-*-*-*-*-*-*-*-*-*-*-*-*-*-*-*-*-*-*-*-*-*-  TELEMETRY, LAST ECG:  Telemetry reviewed    Not on telemetry     Results for orders placed or performed during the hospital encounter of 05/04/23   ECG 12 lead   Result Value    Ventricular Rate 58    Atrial Rate 58    IN Interval 124    QRSD Interval 82    QT Interval 474    QTC Interval 465    P Axis 36    QRS Axis 74    T Wave Axis 43    Narrative    Sinus bradycardia  Otherwise normal ECG  No previous ECGs available  Confirmed by Kaia Schmidt ([id number]) on 5/5/2023 5:18:25 AM        *-*-*-*-*-*-*-*-*-*-*-*-*-*-*-*-*-*-*-*-*-*-*-*-*-*-*-*-*-*-*-*-*-*-*-*-*-*-*-*-*-*-*-*-*-*-*-*-*-*-*-*-*-*  LABORATORY DATA:  I have personally reviewed pertinent labs      CMP:  Results from last 7 days   Lab Units 05/09/23  0441 05/08/23  1359 05/08/23  0746   SODIUM mmol/L 136 136 135   POTASSIUM mmol/L 4 4 4 0 4 0   CHLORIDE mmol/L 105 104 103   CO2 mmol/L 23 25 21   BUN mg/dL 20 17 15   CREATININE mg/dL 0 56* 0 75 0 54*   CALCIUM mg/dL 9 4 9 2 9 3   ALK PHOS U/L 95 112* 100   ALT U/L 314* 389* 326*   AST U/L 125* 233* 222*        Results from last 7 days   Lab Units 05/05/23  2014 05/05/23  0803 05/05/23  0157   MAGNESIUM mg/dL 6 3* 5 9* 5 4*        Cardiac Profile:       Invalid input(s): CK, CKMBP                 Arterial Blood Gas Analysis:    Venous Blood Gas Analysis:       Invalid input(s): PCOVEN     CBC:   Results from last 7 days   Lab Units 05/09/23  0441 05/08/23  1359 05/08/23  0746   WBC Thousand/uL 12 93* 12 64* 12 32*   HEMOGLOBIN g/dL 10 8* 10 6* 10 6*   HEMATOCRIT % 32 2* 31 7* 32 5*   PLATELETS Thousands/uL 167 179 167 PT/INR: No results found for: PT, INR, Microbiology:        *-*-*-*-*-*-*-*-*-*-*-*-*-*-*-*-*-*-*-*-*-*-*-*-*-*-*-*-*-*-*-*-*-*-*-*-*-*-*-*-*-*-*-*-*-*-*-*-*-*-*-*-*-*-  IMAGING STUDIES REPORTS: Imaging studies results reviewed    No valid procedures specified  No Chest XR results available for this patient  *-*-*-*-*-*-*-*-*-*-*-*-*-*-*-*-*-*-*-*-*-*-*-*-*-*-*-*-*-*-*-*-*-*-*-*-*-*-*-*-*-*-*-*-*-*-*-*-*-*-*-*-*-*-  AVAILABLE OLD CARDIAC TESTS REPORTS:   Results for orders placed during the hospital encounter of 19    Echo complete with contrast if indicated    Narrative  [facility] [address]    Transthoracic Echocardiogram  2D, M-mode, Doppler, and Color Doppler    Study date:  01-Aug-2019    Patient: Fern Liu  MR number: [id number]  Account number: [de-identified]  : 2000  Age: 22 years  Gender: Female  Status: Outpatient  Location: [facility]  Height: 63 in  Weight: 154 7 lb  BP: 102/ 70 mmHg    Indications: Murmur  Diagnoses: R01 1 - Cardiac murmur, unspecified    Sonographer:  JEFFERSON Bermudez  Referring Physician:  Theresa Hunt MD  Group:  [facility]  Interpreting Physician:  Karine Juarez MD    SUMMARY    LEFT VENTRICLE:  Normal left ventricular systolic function, EF 45%  Normal left ventricular cavity size  Normal left ventricular wall thickness  Normal left ventricular wall motion without regional wall motion abnormalities  Normal left ventricular  diastolic function and normal left atrial pressures  TRICUSPID VALVE:  There was trace regurgitation  PULMONIC VALVE:  Mild pulmonic stenosis  Pulmonary artery velocity 2 4 m/sec with a peak gradient of 22 mmHg  Mild-to-moderate pulmonic regurgitation  HISTORY: PRIOR HISTORY: Pulmonic stenosis    PROCEDURE: The study was performed in the [facility]  This was a routine study  The transthoracic approach was used   The study included complete 2D imaging, M-mode, complete spectral Doppler, and color Doppler  The heart rate was 70  bpm, at the start of the study  Images were obtained from the parasternal, apical, subcostal, and suprasternal notch acoustic windows  Image quality was adequate  LEFT VENTRICLE: Normal left ventricular systolic function, EF 99%  Normal left ventricular cavity size  Normal left ventricular wall thickness  Normal left ventricular wall motion without regional wall motion abnormalities  Normal left  ventricular diastolic function and normal left atrial pressures  RIGHT VENTRICLE: The size was normal  Systolic function was normal  Wall thickness was normal     LEFT ATRIUM: Size was normal     RIGHT ATRIUM: Size was normal     MITRAL VALVE: Valve structure was normal  There was normal leaflet separation  DOPPLER: The transmitral velocity was within the normal range  There was no evidence for stenosis  There was no regurgitation  AORTIC VALVE: The valve was trileaflet  Leaflets exhibited normal thickness and normal cuspal separation  DOPPLER: Transaortic velocity was within the normal range  There was no evidence for stenosis  There was no regurgitation  TRICUSPID VALVE: The valve structure was normal  There was normal leaflet separation  DOPPLER: The transtricuspid velocity was within the normal range  There was no evidence for stenosis  There was trace regurgitation  PULMONIC VALVE: Mild pulmonic stenosis  Pulmonary artery velocity 2 4 m/sec with a peak gradient of 22 mmHg  Mild-to-moderate pulmonic regurgitation  PERICARDIUM: There was no pericardial effusion  The pericardium was normal in appearance  AORTA: The root exhibited normal size  PULMONARY ARTERY: The size was normal  DOPPLER: Systolic pressure was within the normal range      SYSTEM MEASUREMENT TABLES    2D  %FS: 39 59 %  Ao Diam: 2 37 cm  EDV(Teich): 95 31 ml  EF(Teich): 70 23 %  ESV(Teich): 28 37 ml  IVSd: 0 94 cm  LA Area: 14 3 cm2  LA Diam: 3 28 cm  LVEDV MOD A4C: 78 58 ml  LVEF MOD A4C: 64 41 %  LVESV MOD A4C: 27 96 ml  LVIDd: 4 56 cm  LVIDs: 2 75 cm  LVLd A4C: 9 35 cm  LVLs A4C: 6 83 cm  LVPWd: 0 88 cm  RA Area: 11 46 cm2  RVIDd: 2 86 cm  SV MOD A4C: 50 61 ml  SV(Teich): 66 94 ml    CW  PV Vmax: 2 36 m/s  PV maxP 4 mmHg  TR Vmax: 2 36 m/s  TR maxP 35 mmHg    MM  TAPSE: 2 39 cm    PW  E': 0 15 m/s  E/E': 5 7  MV A Himanshu: 0 61 m/s  MV Dec [place]: 2 84 m/s2  MV DecT: 303 06 ms  MV E Himanshu: 0 86 m/s  MV E/A Ratio: 1 4  MV PHT: 87 89 ms  MVA By PHT: 2 5 cm2    [employer] Accredited Echocardiography Laboratory    Prepared and electronically signed by    Ralph Leonard MD  Signed 02-Aug-2019 10:44:42    No results found for this or any previous visit  No results found for this or any previous visit  No results found for this or any previous visit         *-*-*-*-*-*-*-*-*-*-*-*-*-*-*-*-*-*-*-*-*-*-*-*-*-*-*-*-*-*-*-*-*-*-*-*-*-*-*-*-*-*-*-*-*-*-*-*-*-*-*-*-*-*-  SIGNATURES:   @GBY@   Marilynn Ferraro MD

## 2023-05-09 NOTE — PLAN OF CARE
Problem: POSTPARTUM  Goal: Experiences normal postpartum course  Description: INTERVENTIONS:  - Monitor maternal vital signs  - Assess uterine involution and lochia  2023 by Cresencio Hardy RN  Outcome: Progressing  2023 by Cresencio Hardy RN  Outcome: Progressing  2023 by Cresencio Hardy RN  Outcome: Progressing  Goal: Appropriate maternal -  bonding  Description: INTERVENTIONS:  - Identify family support  - Assess for appropriate maternal/infant bonding   -Encourage maternal/infant bonding opportunities  - Referral to  or  as needed  2023 by Cresencio Hardy RN  Outcome: Progressing  2023 by Cresencio Hardy RN  Outcome: Progressing  2023 by Cresencio Hardy RN  Outcome: Progressing  Goal: Establishment of infant feeding pattern  Description: INTERVENTIONS:  - Assess breast/bottle feeding  - Refer to lactation as needed  2023 by Cresencio Hardy RN  Outcome: Progressing  2023 by Cresencio Hardy RN  Outcome: Progressing  2023 by Cresencio Hardy RN  Outcome: Progressing  Goal: Incision(s), wounds(s) or drain site(s) healing without S/S of infection  Description: INTERVENTIONS  - Assess and document dressing, incision, wound bed, drain sites and surrounding tissue  - Provide patient and family education  - Perform skin care  2023 by Cresencio Hardy RN  Outcome: Progressing  2023 by Cresencio Hardy RN  Outcome: Progressing  2023 by Cresencio Hardy RN  Outcome: Progressing     Problem: PAIN - ADULT  Goal: Verbalizes/displays adequate comfort level or baseline comfort level  Description: Interventions:  - Encourage patient to monitor pain and request assistance  - Assess pain using appropriate pain scale  - Administer analgesics based on type and severity of pain and evaluate response  - Implement non-pharmacological measures as appropriate and evaluate response  - Consider cultural and social influences on pain and pain management  - Notify physician/advanced practitioner if interventions unsuccessful or patient reports new pain  5/9/2023 0849 by Fina Ash RN  Outcome: Progressing  5/9/2023 0847 by Fina Ash RN  Outcome: Progressing  5/9/2023 0846 by Fina Ash RN  Outcome: Progressing     Problem: INFECTION - ADULT  Goal: Absence or prevention of progression during hospitalization  Description: INTERVENTIONS:  - Assess and monitor for signs and symptoms of infection  - Monitor lab/diagnostic results  - Monitor all insertion sites, i e  indwelling lines, tubes, and drains  - Administer medications as ordered  - Instruct and encourage patient and family to use good hand hygiene technique  - Identify and instruct in appropriate isolation precautions for identified infection/condition  5/9/2023 0849 by Fina Ash RN  Outcome: Progressing  5/9/2023 0847 by Fina Ash RN  Outcome: Progressing  5/9/2023 0846 by Fina Ash RN  Outcome: Progressing  Goal: Absence of fever/infection during neutropenic period  Description: INTERVENTIONS:  - Monitor WBC    5/9/2023 0849 by Fina Ash RN  Outcome: Progressing  5/9/2023 0847 by Fina Ash RN  Outcome: Progressing  5/9/2023 0846 by Fina Ash RN  Outcome: Progressing     Problem: SAFETY ADULT  Goal: Patient will remain free of falls  Description: INTERVENTIONS:  - Keep Call bell within reach  - Keep bed low and locked with side rails adjusted as appropriate  - Keep care items and personal belongings within reach  - Initiate and maintain comfort rounds    5/9/2023 0849 by Fina Ash RN  Outcome: Progressing  5/9/2023 0847 by Fina Ash RN  Outcome: Progressing  5/9/2023 0846 by Fina Ash RN  Outcome: Progressing  Goal: Maintain or return to baseline ADL function  Description: INTERVENTIONS:  - Provide patient education as appropriate  5/9/2023 0849 by Fina Ash RN  Outcome: Progressing  5/9/2023 0847 by Hyacinth Ayala RN  Outcome: Progressing  5/9/2023 0846 by Hyacinth Ayala RN  Outcome: Progressing  Goal: Maintains/Returns to pre admission functional level  Description: INTERVENTIONS:  - Record patient progress and toleration of activity level   5/9/2023 0849 by Hyacinth Ayala RN  Outcome: Progressing  5/9/2023 0847 by Hyacinth Ayala RN  Outcome: Progressing  5/9/2023 0846 by Hyacinth Ayala RN  Outcome: Progressing     Problem: Knowledge Deficit  Goal: Patient/family/caregiver demonstrates understanding of disease process, treatment plan, medications, and discharge instructions  Description: Complete learning assessment and assess knowledge base    Interventions:  - Provide teaching at level of understanding  - Provide teaching via preferred learning methods  5/9/2023 0849 by Hyacinth Ayala RN  Outcome: Progressing  5/9/2023 0847 by Hyacinth Ayala RN  Outcome: Progressing  5/9/2023 0846 by Hyacinth Ayala RN  Outcome: Progressing     Problem: DISCHARGE PLANNING  Goal: Discharge to home or other facility with appropriate resources  Description: INTERVENTIONS:  - Identify barriers to discharge w/patient and caregiver  - Arrange for needed discharge resources and transportation as appropriate  - Identify discharge learning needs (meds, wound care, etc )  - Arrange for interpretive services to assist at discharge as needed  - Refer to Case Management Department for coordinating discharge planning if the patient needs post-hospital services based on physician/advanced practitioner order or complex needs related to functional status, cognitive ability, or social support system  5/9/2023 0849 by Hyacinth Ayala RN  Outcome: Progressing  5/9/2023 0847 by Hyacinth Ayala RN  Outcome: Progressing  5/9/2023 0846 by Hyacinth Ayala RN  Outcome: Progressing

## 2023-05-09 NOTE — PLAN OF CARE
Problem: POSTPARTUM  Goal: Experiences normal postpartum course  Description: INTERVENTIONS:  - Monitor maternal vital signs  - Assess uterine involution and lochia  2023 by Josef Madsen RN  Outcome: Adequate for Discharge  2023 0849 by Josef Madsen RN  Outcome: Progressing  2023 0847 by Josef Madsen RN  Outcome: Progressing  2023 0846 by Josef Madsen RN  Outcome: Progressing  Goal: Appropriate maternal -  bonding  Description: INTERVENTIONS:  - Identify family support  - Assess for appropriate maternal/infant bonding   -Encourage maternal/infant bonding opportunities  - Referral to  or  as needed  2023 by Josef Madsen RN  Outcome: Adequate for Discharge  2023 0849 by Josef Madsen RN  Outcome: Progressing  2023 0847 by Josef Madsen RN  Outcome: Progressing  2023 0846 by Josef Madsen RN  Outcome: Progressing  Goal: Establishment of infant feeding pattern  Description: INTERVENTIONS:  - Assess breast/bottle feeding  - Refer to lactation as needed  2023 by Josef Madsen RN  Outcome: Adequate for Discharge  2023 0849 by Josef Madsen RN  Outcome: Progressing  2023 0847 by Josef Madsen RN  Outcome: Progressing  2023 0846 by Josef Madsen RN  Outcome: Progressing  Goal: Incision(s), wounds(s) or drain site(s) healing without S/S of infection  Description: INTERVENTIONS  - Assess and document dressing, incision, wound bed, drain sites and surrounding tissue  - Provide patient and family education  - Perform skin care  2023 by Josef Madsen RN  Outcome: Adequate for Discharge  2023 0849 by Josef Madsen RN  Outcome: Progressing  2023 0847 by Josef Madsen RN  Outcome: Progressing  2023 0846 by Josef Madsen RN  Outcome: Progressing     Problem: PAIN - ADULT  Goal: Verbalizes/displays adequate comfort level or baseline comfort level  Description: Interventions:  - Encourage patient to monitor pain and request assistance  - Assess pain using appropriate pain scale  - Administer analgesics based on type and severity of pain and evaluate response  - Implement non-pharmacological measures as appropriate and evaluate response  - Consider cultural and social influences on pain and pain management  - Notify physician/advanced practitioner if interventions unsuccessful or patient reports new pain  5/9/2023 1811 by Gladis Ross RN  Outcome: Adequate for Discharge  5/9/2023 0849 by Gladis Ross RN  Outcome: Progressing  5/9/2023 0847 by Gladis Ross RN  Outcome: Progressing  5/9/2023 0846 by Gladis Ross RN  Outcome: Progressing     Problem: INFECTION - ADULT  Goal: Absence or prevention of progression during hospitalization  Description: INTERVENTIONS:  - Assess and monitor for signs and symptoms of infection  - Monitor lab/diagnostic results  - Monitor all insertion sites, i e  indwelling lines, tubes, and drains  - Administer medications as ordered  - Instruct and encourage patient and family to use good hand hygiene technique  - Identify and instruct in appropriate isolation precautions for identified infection/condition  5/9/2023 1811 by Gladis Ross RN  Outcome: Adequate for Discharge  5/9/2023 0849 by Gladis Ross RN  Outcome: Progressing  5/9/2023 0847 by Gladis Ross RN  Outcome: Progressing  5/9/2023 0846 by Gladis Ross RN  Outcome: Progressing  Goal: Absence of fever/infection during neutropenic period  Description: INTERVENTIONS:  - Monitor WBC    5/9/2023 1811 by Gladis Ross RN  Outcome: Adequate for Discharge  5/9/2023 0849 by Gladis Ross RN  Outcome: Progressing  5/9/2023 0847 by Gladis Ross RN  Outcome: Progressing  5/9/2023 0846 by Gladis Ross RN  Outcome: Progressing     Problem: SAFETY ADULT  Goal: Patient will remain free of falls  Description: INTERVENTIONS:  - Keep Call bell within reach  - Keep bed low and locked with side rails adjusted as appropriate  - Keep care items and personal belongings within reach  - Initiate and maintain comfort rounds    5/9/2023 1811 by Manjula Fermin RN  Outcome: Adequate for Discharge  5/9/2023 0849 by Manjula Fermin RN  Outcome: Progressing  5/9/2023 0847 by Manjula Fermin RN  Outcome: Progressing  5/9/2023 0846 by Manjula Fermin RN  Outcome: Progressing  Goal: Maintain or return to baseline ADL function  Description: INTERVENTIONS:  - Provide patient education as appropriate  5/9/2023 1811 by Manjula Fermin RN  Outcome: Adequate for Discharge  5/9/2023 0849 by Manjula Fermin RN  Outcome: Progressing  5/9/2023 0847 by Manjula Fermin RN  Outcome: Progressing  5/9/2023 0846 by Manjula Fermin RN  Outcome: Progressing  Goal: Maintains/Returns to pre admission functional level  Description: INTERVENTIONS:  - Record patient progress and toleration of activity level   5/9/2023 1811 by Manjula Fermin RN  Outcome: Adequate for Discharge  5/9/2023 0849 by Manjula Fermin RN  Outcome: Progressing  5/9/2023 0847 by Manjula Fermin RN  Outcome: Progressing  5/9/2023 0846 by Manjula Fermin RN  Outcome: Progressing     Problem: Knowledge Deficit  Goal: Patient/family/caregiver demonstrates understanding of disease process, treatment plan, medications, and discharge instructions  Description: Complete learning assessment and assess knowledge base    Interventions:  - Provide teaching at level of understanding  - Provide teaching via preferred learning methods  5/9/2023 1811 by Manjula Fermin RN  Outcome: Adequate for Discharge  5/9/2023 0849 by Manjula Fermin RN  Outcome: Progressing  5/9/2023 0847 by Manjula Fermin RN  Outcome: Progressing  5/9/2023 0846 by Manjula Fermin RN  Outcome: Progressing     Problem: DISCHARGE PLANNING  Goal: Discharge to home or other facility with appropriate resources  Description: INTERVENTIONS:  - Identify barriers to discharge w/patient and caregiver  - Arrange for needed discharge resources and transportation as appropriate  - Identify discharge learning needs (meds, wound care, etc )  - Arrange for interpretive services to assist at discharge as needed  - Refer to Case Management Department for coordinating discharge planning if the patient needs post-hospital services based on physician/advanced practitioner order or complex needs related to functional status, cognitive ability, or social support system  5/9/2023 1811 by Maryam Caceres RN  Outcome: Adequate for Discharge  5/9/2023 0849 by Maryam Caceres RN  Outcome: Progressing  5/9/2023 0847 by Maryam Caceres RN  Outcome: Progressing  5/9/2023 0846 by Maryam Caceres RN  Outcome: Progressing

## 2023-05-09 NOTE — PLAN OF CARE

## 2023-05-09 NOTE — UTILIZATION REVIEW
Initial Clinical Review    Admission: Date/Time/Statement:   Admission Orders (From admission, onward)     Ordered        23 1743  Inpatient Admission  Once                      Orders Placed This Encounter   Procedures   • Inpatient Admission     Standing Status:   Standing     Number of Occurrences:   1     Order Specific Question:   Level of Care     Answer:   Med Surg [16]     Order Specific Question:   Estimated length of stay     Answer:   More than 2 Midnights     Order Specific Question:   Certification     Answer:   I certify that inpatient services are medically necessary for this patient for a duration of greater than two midnights  See H&P and MD Progress Notes for additional information about the patient's course of treatment  ED Arrival Information     Patient not seen in ED                     Chief Complaint   Patient presents with   • Hypertension       Initial Presentation: 20 y o  female w 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  at 36w4d sent from [facility]  for Inpatient admission due to newly diagnosed preeclampsia with severe features w recommendation for delivery & Cardiology consult; patient w Ishan Matsonball,  primary low-transverse  section in the setting of preeclampsia with severe features refractory to antihypertensive treatment,   Requiring  ongoing stay Post partum for management of antiHTN meds on Cardiology recs for Pre eclampsia w SF    EXAM  Severe BPs BP: (146-180)/() 160/94 on admission; given IV labetalol 20/40/40/80 mg and IV Hydralazine 5/5/10 w/ MAG bolus & IV GTT   SVE: 3/80/-1   PC ratio elevated at 0 61  Noted for FHR decels required STAT C section    Primary  SECTION for  Viable female  at 2234, apgars 8 & 9; birthwt= 5LB 4 OZ  Date: 2023   Day 2:   PPD# 1 s/p  primary  section recovering well; pain controlled; no return of bowel function so far; Cartwright intact;  Continue magnesium for 24 hr PP for seizure ppx  Started on Procardia XL 30 mg daily on 23 prior to delivery  Follow BPs, labs Q 6hr, consult Cardiology  Monitor borderline thrombocytopenia  Cardiology  ECHO  exam suggests intravascular volume depletion; Plan cont current meds; cont assess for side effects such HA, tachycardia, palpitations, facial flushing, worsening edema; profound hypotension < 140/80  currently receiving nifedipine 24-hour tablet 30 mg daily, PRN  IV labetalol and hydralazine prior to induction of labor  Receiving magnesium sulfate IV for her preeclampsia diagnosis and the plan is to continue this till tomorrow     DATE 2023  DAY 3  OB MD    PPD# 2 s/p  primary  section for Pre eclampsia w SF recovering well  Recommend  Continued BP monitoring for worsening Pre eclampsia  Systolic (87UHJ), VDH:676 , Min:129 , ROHAN:390        Diastolic (98GLB), AJQ:25, Min:75, Max:91  COnt Procardia XL 30 mg  CARDIOLOGY  Doing well w no symptoms of Pre eclampsia w well controlled BP  Trace pedal edema pregnancy related; cont current antiHTN meds w OP follow up 3-4 WKs    DATE  2023  DAY 4  PPD# 3 s/p  primary  section for Pre eclampsia w SF recovering well  On BP review; blood pressures are elevated w/ several blood pressures yesterday in the severe range, although unsustained  Increased her procardia 30 XL to 60 mg XL today , increase another 30 mg of Procardia XL for a total of 90mg PO today observe  bps for at least 24 hours  Possible transfer her to a higher level of care if  reach the maximum dose of hydralazine  All questions answered  Will await the next BP reading  DATE  2023  DAY 5  PPD# 4  Primary LTCS recovering well; stable thrombocytopenia;   on procardia XL 90mg daily  BP required hydralazine 5/10/10mg since  yesterday evening  Responds well to this adjunct and discussed transitioning to PO hydralazine in addition to procardia   Received 10mg IV at 422; will time first dose for noon       Labs reveal elevated LFTs w/  increased AST//178 -->222/326  She does not endorse any specific GI symptoms to suggest alternative diagnosis  Continue to monitor labs and BP closely, consult Cardiology for recs of elevated BP despite escalating doses of Procardia XL ( currently 90 mg qD)   CARDIOLOGY   Blood pressures still elevated despite escalating dose of Procardia XL  Will need second agent antihypertensive therapy   Recommend adding metoprolol tart tolerated 25 mg twice daily  Continue Procardia XL at 90 mg daily  Addition of hydralazine can be considered if blood pressure remains uncontrolled  Recommended goal for blood pressure would be around 130/80 mmHg  Recommend 1 dose of furosemide 20 mg p o  today  Continued low-salt diet and other precautions to lower blood pressure  Serial monitoring of liver function tests and continued postpartum care      DATE  2023  DAY 6  PPD# 5 s/p  primary  section, low transverse incision, recovering well & stable  Pre eclampsia w SF Plan to increase BP medication this morning to Procardia 120 mg XL qd this AM  Continue Metoprolol 25mg BID per cardiology recommendations  Monitor for signs/symptoms of worsening preeclampsia  CARDIOLOGY  Recommend adding hydralazine at 25 mg 3 times daily  Agree with increasing the Procardia dose to 120 mg once daily  Change metoprolol to labetalol 100 mg twice daily  Added TSH and free T4 to the blood drawn earlier  Recommend repeat urine analysis for proteinuria, plasma aldosterone/renin ratio and cortisol with a m  labs  Assess response to medication changes  If blood pressure improves to systolic less than 066 and diastolic less than 90 mmHg then it may be  okay to be discharged with the remainder titration of medications as an outpatient  If blood pressure remains elevated should also do Renal artery ultrasound       Triage Vitals   Temperature Pulse Respirations Blood Pressure SpO2   05/04/23 1800 05/04/23 1842 05/04/23 1800 05/04/23 1800 05/04/23 2114   98 5 °F (36 9 °C) 70 20 (!) 175/111 91 %      Temp Source Heart Rate Source Patient Position - Orthostatic VS BP Location FiO2 (%)   05/04/23 1800 05/04/23 1800 05/04/23 2330 05/04/23 2330 --   Oral Monitor Lying Right arm       Pain Score       05/04/23 1800       No Pain          Wt Readings from Last 1 Encounters:   05/05/23 89 4 kg (197 lb)     Additional Vital Signs:   Date/Time Temp Pulse Resp BP MAP (mmHg) SpO2 O2 Device Cardiac (WDL) Patient Position - Orthostatic VS   05/09/23 0845 -- -- -- 163/99 -- -- -- -- Sitting   05/09/23 0825 -- 97 -- 152/92 -- -- -- -- Sitting   05/09/23 0810 -- -- -- 149/95 -- -- -- -- Lying   05/09/23 0800 98 °F (36 7 °C) 109 Abnormal  16 149/103 Abnormal  -- 97 % None (Room air)        05/08/23 1137 -- 96 16 158/94 -- -- -- -- Sitting   05/08/23 1040 -- -- -- 155/96 -- -- -- -- Sitting   05/08/23 0940 -- -- -- 158/64 -- -- -- -- Lying   05/08/23 0840 -- -- -- 148/82 -- -- -- -- Lying   05/08/23 0735 -- -- -- 154/87 -- -- -- -- --   05/08/23 0710 98 7 °F (37 1 °C) 84 16 148/90 -- -- None (Room air) X --   05/08/23 0645 -- -- -- 154/82 -- -- -- -- --   05/08/23 0620 -- -- -- 158/78 -- -- -- -- --       05/07/23 2010 -- -- -- 160/105 Abnormal  -- -- -- -- --   05/07/23 1955 -- 85 -- 162/97 -- -- -- -- --   05/07/23 1935 -- 87 -- 165/98 -- -- -- -- --   05/07/23 1922 -- -- -- 156/107 Abnormal  -- -- -- -- --   05/07/23 1921 -- -- -- 175/111 Abnormal  -- -- -- -- --   05/07/23 1859 -- -- -- 185/117 Abnormal  -- -- -- -- --   05/07/23 1850 -- 76 -- 180/117 Abnormal  -- -- -- -- --   05/07/23 1840 -- 77 -- 169/102 Abnormal  -- 98 % -- -- --   05/07/23 1830 -- 78 -- 168/113 Abnormal  -- 98 % -- -- --   05/07/23 1820 -- 77 -- 165/110 Abnormal  -- 97 % -- -- --   05/07/23 1810 -- 80 -- 157/111 Abnormal  -- 97 % -- -- --   05/07/23 1800 -- 84 -- 182/112 Abnormal  -- 99 % -- -- --   05/07/23 1745 -- 84 -- "169/116 Abnormal  -- -- -- -- --   05/07/23 1730 -- 80 -- 168/108 Abnormal   -- -- -- -- --   BP: Dr Gertrudis Bello aware  at 05/07/23 1730   05/07/23 1715 -- 86 -- 176/111 Abnormal   -- -- -- -- --   BP: Dr Aster Goodson aware  RN Pushing Labetalol now  at 05/07/23 1715   05/07/23 1700 -- 88 -- 165/115 Abnormal  -- -- -- -- --   05/07/23 1650 -- 81 -- 166/111 Abnormal  -- 96 % -- -- --   05/07/23 1635 -- 88 -- 158/108 Abnormal  -- -- -- -- --   05/07/23 1607 -- 91 -- 163/112 Abnormal  -- -- -- -- --   05/07/23 1530 -- -- -- 162/112 Abnormal   -- -- -- -- --   BP: Dr Aster Goodson and Dr Sriram Selby aware  at 05/07/23 1530   05/07/23 1510 98 7 °F (37 1 °C) 111 Abnormal  -- 157/117 Abnormal   -- -- -- -- --   BP: Dr Aster Goodson and Dr Sriram Selby aware at 05/07/23 1510   05/07/23 1500 -- -- -- -- -- -- None (Room air) WDL --   05/07/23 1100 98 7 °F (37 1 °C) 101 20 150/105 Abnormal   -- 98 % None (Room air) -- Sitting   BP: Dr Sriram Selby aware  at 05/07/23 1100   05/07/23 0745 -- -- -- -- -- -- None (Room air) WDL --   05/07/23 0700 98 7 °F (37 1 °C) 71 18 144/91  -- 97 % None (Room air) -- Sitting   BP: Dr Gi Morales aware   at 05/07/23 0700   05/07/23 0625 -- -- -- 132/95 -- -- -- -- Lying   05/07/23 0509 -- -- -- 136/100 -- -- -- -- Lying   05/07/23 0453 -- -- -- 160/94 -- -- -- -- Sitting   05/07/23 0437 -- -- -- 158/93 -- -- -- -- --   05/07/23 0435 98 1 °F (36 7 °C) 83 16 160/99 --         Date/Time Weight Height   05/05/23 0847 89 4 kg (197 lb) 5' 3\" (1 6 m)   05/04/23 1800 89 4 kg (197 lb) 5' 3\" (1 6 m)     Pertinent Labs/Diagnostic Test Results:   No orders to display         Results from last 7 days   Lab Units 05/09/23  0441 05/08/23  1359 05/08/23  0746 05/07/23  1920 05/06/23  0756 05/05/23  0803 05/05/23  0157 05/04/23  2159   WBC Thousand/uL 12 93* 12 64* 12 32* 13 60* 15 90*   < > 18 98* 15 26*   HEMOGLOBIN g/dL 10 8* 10 6* 10 6* 10 2* 9 7*   < > 13 0 12 5   HEMATOCRIT % 32 2* 31 7* 32 5* 30 8* 28 6*   < > 38 7 36 5   PLATELETS " Thousands/uL 167 179 167 143* 135*   < > 153 149   NEUTROS ABS Thousands/µL  --   --  9 53*  --   --   --  17 26* 13 61*    < > = values in this interval not displayed           Results from last 7 days   Lab Units 05/09/23 0441 05/08/23  1359 05/08/23  0746 05/07/23 1920 05/06/23 0756 05/05/23 2014 05/05/23 0803 05/05/23 0157 05/04/23  2159   SODIUM mmol/L 136 136 135 135 135 134* 131* 133* 131*   POTASSIUM mmol/L 4 4 4 0 4 0 4 5 4 3 4 5 4 6 4 0 4 6   CHLORIDE mmol/L 105 104 103 104 105 103 102 103 104   CO2 mmol/L 23 25 21 22 25 25 23 18* 19*   ANION GAP mmol/L 8 7 11 9 5 6 6 12 8   BUN mg/dL 20 17 15 17 18 17 12 14 14   CREATININE mg/dL 0 56* 0 75 0 54* 0 66 0 63 0 76 0 70 0 72 0 73   EGFR ml/min/1 73sq m 131 112 133 124 126 110 122 118 116   CALCIUM mg/dL 9 4 9 2 9 3 9 2 7 0* 7 3* 7 7* 8 5 8 9   MAGNESIUM mg/dL  --   --   --   --   --  6 3* 5 9* 5 4* 5 0*     Results from last 7 days   Lab Units 05/09/23 0441 05/08/23  1359 05/08/23  0746 05/07/23 1920 05/06/23  0756   AST U/L 125* 233* 222* 141* 24   ALT U/L 314* 389* 326* 178* 35   ALK PHOS U/L 95 112* 100 101 102   TOTAL PROTEIN g/dL 6 5 6 7 6 4 6 3* 5 7*   ALBUMIN g/dL 3 4* 3 5 3 4* 3 3* 3 0*   TOTAL BILIRUBIN mg/dL 0 29 0 31 0 33 0 28 0 20         Results from last 7 days   Lab Units 05/09/23 0441 05/08/23  1359 05/08/23  0746 05/07/23 1920 05/06/23 0756 05/05/23 2014 05/05/23 0803 05/05/23 0157 05/04/23 2159 05/04/23  1942   GLUCOSE RANDOM mg/dL 87 122 99 90 82 113 96 140 108 95           Results from last 7 days   Lab Units 05/04/23  1029   CREATININE UR mg/dL 212 4   PROTEIN UR mg/dL 129   PROT/CREAT RATIO UR  0 61*       ED Treatment:   Medication Administration - No Administrations Displayed (No Start Event Found)     None        Past Medical History:   Diagnosis Date   • Heart murmur    • Right ovarian cyst 06/02/2020   • Varicella      Present on Admission:  • Congenital pulmonary stenosis  • Pre-eclampsia in third trimester  • Anemia during pregnancy in third trimester  • Thrombocytopenia affecting pregnancy, antepartum ([facility] [address] )      Admitting Diagnosis: Hypertension affecting pregnancy in third trimester [O16 3]  Age/Sex: 20 y o  female  Admission Orders:   Vitals, pulse oximetry, auscultate breath sounds, clonus checks Q 2hr while on IV MAG  freq BP  Continuous external uterine contraction & fetal HR monitoring    Scheduled Medications:  docusate sodium, 100 mg, Oral, BID  enoxaparin, 40 mg, Subcutaneous, Q24H ITALO  ibuprofen, 600 mg, Oral, Q6H ITALO  metoprolol tartrate, 25 mg, Oral, Q12H ITALO  NIFEdipine, 120 mg, Oral, Daily  PO=  furosemide (LASIX) tablet 20 mg 5/8 x1  Dose: 20 mg  Freq: Once Route: PO    IV Doses=   hydrALAZINE (APRESOLINE) injection 10 mg  Dose: 10 mg  Freq: Once Route: IV  Start: 05/09/23 0700 End: 05/09/23 0709         0709 5/9/2023        hydrALAZINE (APRESOLINE) injection 10 mg  Dose: 10 mg  Freq: Once Route: IV  Start: 05/09/23 0415 End: 05/09/23 0422 04225/9/2023        hydrALAZINE (APRESOLINE) injection 10 mg  Dose: 10 mg  Freq: Once Route: IV  Indications of Use: PREECLAMPSIA  Start: 05/08/23 0415 End: 05/08/23 0422 0422 5/8/2023         hydrALAZINE (APRESOLINE) injection 10 mg  Dose: 10 mg  Freq: Once Route: IV  Start: 05/07/23 1900 End: 05/07/23 1921       1921 [C]  5/7/2023        hydrALAZINE (APRESOLINE) injection 5 mg  Dose: 5 mg  Freq: Once Route: IV  Start: 05/07/23 2015 End: 05/07/23 2010 2010 5/7/2023          hydrALAZINE (APRESOLINE) injection 5 mg  Dose: 5 mg  Freq: Once Route: IV  Start: 05/04/23 2000 End: 05/04/23 2004 2004 5/4/2023             hydrALAZINE (APRESOLINE) injection 5 mg  Dose: 5 mg  Freq: Once Route: IV  Start: 05/04/23 1930 End: 05/04/23 1933 1933 5/4/2023          IV=    labetalol (NORMODYNE) injection 20 mg  Dose: 20 mg  Freq:  Once Route: IV  Start: 05/07/23 1545 End: 05/07/23 1607           1607 5/7/2023          labetalol (NORMODYNE) injection 20 mg  Dose: 20 mg  Freq: Once Route: IV  Start: 05/04/23 1815 End: 05/04/23 1806        1806 5/4/2023             labetalol (NORMODYNE) injection 40 mg  Dose: 40 mg  Freq: Once Route: IV  Start: 05/07/23 1715 End: 05/07/23 1713           1713 5/7/2023          labetalol (NORMODYNE) injection 40 mg  Dose: 40 mg  Freq: Once Route: IV  Start: 05/04/23 1900 End: 05/04/23 1850        1850 5/4/2023             labetalol (NORMODYNE) injection 40 mg  Dose: 40 mg  Freq: Once Route: IV  Start: 05/04/23 1830 End: 05/04/23 1827 1827 5/4/2023             labetalol (NORMODYNE) injection 60 mg  Dose: 60 mg  Freq: Once Route: IV  Start: 05/07/23 1745 End: 05/07/23 1754           1754 5/7/2023          labetalol (NORMODYNE) injection 80 mg  Dose: 80 mg  Freq: Once Route: IV  Start: 05/07/23 1830 End: 05/07/23 1832           1832 5/7/2023          labetalol (NORMODYNE) injection 80 mg  Dose: 80 mg  Freq: Once Route: IV        5/4/2023 1913          IV=   magnesium sulfate 4 g/100 mL IVPB (premix) 4 g  Dose: 4 g  Freq: Once Route: IV  Last Dose: Stopped (05/04/23 1831)  Start: 05/04/23 1815 End: 05/04/23 1831 1814 5/4/2023     1831        Followed by  magnesium sulfate 2 g/50 mL IVPB (premix) 2 g  Dose: 2 g  Freq: Once Route: IV    1831 5/4/2023     Continuous IV Infusions:    magnesium sulfate 20 g/500 mL infusion (premix) 5/4/2023 1842 & DC 5/5 2237  Rate: 50 mL/hr Dose: 2 g/hr  Freq: Continuous Route: IV     lactated ringers infusion 5/4/2023 2330 & DC 5/5 2237  Rate: 125 mL/hr Dose: 125 mL/hr  Freq: Continuous Route: IV  PRN Meds:  benzocaine-menthol-lanolin-aloe, 1 application  , Topical, Q6H PRN  calcium carbonate, 1,000 mg, Oral, Daily PRN  diphenhydrAMINE, 25 mg, Oral, Q6H PRN  hydrocortisone, 1 application  , Topical, Daily PRN  ondansetron, 4 mg, Intravenous, Q8H PRN  oxyCODONE, 5 mg, Oral, Q4H PRN   Or  oxyCODONE, 10 mg, Oral, Q4H PRN  simethicone, 80 mg, Oral, 4x Daily PRN  sod citrate-citric acid, 30 mL, Oral, 4x Daily PRN  witch hazel-glycerin, 1 pad , Topical, Q4H PRN        IP CONSULT TO CARDIOLOGY  IP CONSULT TO PERINATOLOGY    Network Utilization Review Department  ATTENTION: Please call with any questions or concerns to [phone] and carefully listen to the prompts so that you are directed to the right person  All voicemails are confidential   [facility] all requests for admission clinical reviews, approved or denied determinations and any other requests to dedicated fax number below belonging to the campus where the patient is receiving treatment   List of dedicated fax numbers for the Facilities:  [address] DENIALS (Administrative/Medical Necessity) [phone]   [address] (Maternity/NICU/Pediatrics) [phone]   [address] [phone]   [facility] [place]  [phone]   [address] [address] [facility] [phone]   1556 [facility] 134 [address] [facility] [phone]

## 2023-05-09 NOTE — LACTATION NOTE
This note was copied from a baby's chart  05/09/23 1420   Lactation Consultation   Reason for Consult 20 m;10 minute   Risk Factors LPI   LATCH Documentation   Latch 2   Audible Swallowing 1   Type of Nipple 2   Comfort (Breast/Nipple) 0   Hold (Positioning) 0   LATCH Score 5   Having latch problems? No   Position(s) Used Football   Breasts/Nipples   Left Breast Engorged   Right Breast Engorged   Left Nipple Everted   Right Nipple Everted   Intervention Breast pump;Hand expression   Breastfeeding Status Yes   Breastfeeding Progress Not yet established   Other OB Lactation Tools   Feeding Devices Pump; Bottle   Patient Follow-Up   Lactation Consult Status 2   Follow-Up Type Inpatient;Call as needed   Other OB Lactation Documentation    Additional Problem Noted Rose Marie Zamudio c/o full firm breasts and not able to get Cat to open her mouth wide enough to latch  Reveiwed lymphatic drainage of the breast and alignment of the baby to increase depth of latch  Reviewed cycling of breast pump and hands on pumping  Pumping:   - When pumping, begin in stimulation mode (high cycle, low vacuum) until milk begins to express  Change pump to expression mode (low cycle, high vacuum)  Use hands on pumping techniques to assist with milk transfer  When milk stops expressing, change back to stimulation mode  When milk begins to flow, change to expression mode  You may cycle pump up to three times in a pumping session  Instructions given on pumping  Discussed when to start, frequency, different pumps available versus manual expression  Instructions given on pumping  Discussed when to start, frequency, different pumps available versus manual expression  Discussed hygiene of hands and supplies as well as assembly, placement of flanges, size of flanged, preparing the breast and cycles and suction settings on pump  Demonstrated use of hand pump      Discussed labeling of milk, storage, and preparation of stored milk     Reviewed how to bring baby to the breast so that her lower lip and chin touch the breast with her nose just above the nipple to encourage a wider, more asymmetric latch  \    Encouraged parents to call for assistance, questions, and concerns about breastfeeding  Extension provided

## 2023-05-10 ENCOUNTER — TELEPHONE (OUTPATIENT)
Dept: CARDIOLOGY CLINIC | Facility: CLINIC | Age: 23
End: 2023-05-10

## 2023-05-10 ENCOUNTER — CLINICAL SUPPORT (OUTPATIENT)
Dept: OBGYN CLINIC | Facility: MEDICAL CENTER | Age: 23
End: 2023-05-10

## 2023-05-10 ENCOUNTER — TELEPHONE (OUTPATIENT)
Dept: OBGYN CLINIC | Facility: MEDICAL CENTER | Age: 23
End: 2023-05-10

## 2023-05-10 DIAGNOSIS — Z01.30 BLOOD PRESSURE CHECK: Primary | ICD-10-CM

## 2023-05-10 NOTE — UTILIZATION REVIEW
NOTIFICATION OF ADMISSION DISCHARGE   This is a Notification of Discharge from [facility]  Please be advised that this patient has been discharge from our facility  Below you will find the admission and discharge date and time including the patient’s disposition  UTILIZATION REVIEW CONTACT:  Alcides Hi  Utilization   Network Utilization Review Department  Phone: [phone] x carefully listen to the prompts  All voicemails are confidential   Email: [email or web address]     ADMISSION INFORMATION  PRESENTATION DATE: 5/4/2023  5:10 PM  OBERVATION ADMISSION DATE:   INPATIENT ADMISSION DATE: 5/4/23  5:43 PM   DISCHARGE DATE: 5/9/2023  6:45 PM   DISPOSITION:Home/Self Care    IMPORTANT INFORMATION:  Send all requests for admission clinical reviews, approved or denied determinations and any other requests to dedicated fax number below belonging to the campus where the patient is receiving treatment   List of dedicated fax numbers:  [address] DENIALS (Administrative/Medical Necessity) [phone]   [address] (Maternity/NICU/Pediatrics) [phone]   [facility] [phone]   [facility] 87 [phone]   [address] [phone]   220 [facility] [phone]   90 [facility] [phone]   [facility] 119 [phone]   [facility]  [phone]   [address] [facility] [phone]   [facility] [address] [facility] [phone]

## 2023-05-10 NOTE — PROGRESS NOTES
Patient here for BP check  BP today 138/80  Denies HA, blurred vision, SOB, chest pain, abnormal swelling  Instructed patient on when to contact the office  reviewed how to use her home BP cuff

## 2023-05-10 NOTE — TELEPHONE ENCOUNTER
Called patient to inquire about her blood pressure at home and her symptoms.  She reports that she is feeling good.  She denies chest pain shortness of breath dizziness or headache or pedal edema.  Reports that she has been monitoring her blood pressure since coming home and after morning medications her blood pressure was 128/79, HR was 92.  Blood pressures have been in this range only.    Advised her to continue this regimen.  If blood pressure becomes lower than 120/70 mmHg she is advised to cut down the dose of hydralazine to half a pill 3 times daily while continuing other medications.  She will need continued close follow-up with her OB/GYN doctor and primary care physician and may need further adjustment of medication doses as blood pressures respond over the next few weeks.  We will need for cardiology office visit in the next 2 to 3 weeks.    Prachi Doll MD

## 2023-05-12 ENCOUNTER — TELEPHONE (OUTPATIENT)
Dept: OBGYN CLINIC | Facility: CLINIC | Age: 23
End: 2023-05-12

## 2023-05-16 ENCOUNTER — OFFICE VISIT (OUTPATIENT)
Dept: OBGYN CLINIC | Facility: MEDICAL CENTER | Age: 23
End: 2023-05-16

## 2023-05-16 VITALS
SYSTOLIC BLOOD PRESSURE: 126 MMHG | DIASTOLIC BLOOD PRESSURE: 82 MMHG | HEIGHT: 63 IN | BODY MASS INDEX: 31.18 KG/M2 | WEIGHT: 176 LBS

## 2023-05-16 DIAGNOSIS — Z98.891 STATUS POST PRIMARY LOW TRANSVERSE CESAREAN SECTION: Primary | ICD-10-CM

## 2023-05-16 DIAGNOSIS — O14.93 PRE-ECLAMPSIA IN THIRD TRIMESTER: ICD-10-CM

## 2023-05-16 NOTE — PROGRESS NOTES
"Eric Foss is a 20 y o  female who presents to the clinic 1 weeks status post primary  section for severe preeclampsia remote from delivery   Eating a regular diet without difficulty  Bowel movements are normal  Pain is controlled with current analgesics  Medications being used: acetaminophen  The following portions of the patient's history were reviewed and updated as appropriate: allergies, current medications, past family history, past medical history, past social history, past surgical history and problem list     Review of Systems  Pertinent items are noted in HPI  Objective     /82   Ht 5' 3\" (1 6 m)   Wt 79 8 kg (176 lb)   LMP 2022   BMI 31 18 kg/m²   General:  alert and oriented, in no acute distress   Abdomen: soft, bowel sounds active, non-tender   Incision:   healing well, no drainage, no erythema, no hernia, no seroma, no swelling, no dehiscence, incision well approximated         Assessment      Doing well postoperatively  /82 on 3 antihypertensives, denies headache, visual changes or epigastric pain   Has follow up with cardiology Thursday   Plan     1  Continue any current medications  2  Wound care discussed  3  Activity restrictions: no gym class and no repetitive motion  4  Anticipated return to work: not applicable  5  Follow up: 5 weeks for postpartum check / we discussed signs and symptoms of preeclampsia for which immediate attention needed  l     "

## 2023-05-17 NOTE — PROGRESS NOTES
Cardiology Follow Up    Daniela Romero  2000  [id number]  [address] [facility] [address]  [phone]    1  Pre-eclampsia in third trimester        2  Pulmonary valve stenosis, unspecified etiology            Interval History:   On 5/10/23 Sheng Arguelles was called by Dr Carter Beckman to evaluate home BP readings and symptoms  She was feeling well  BP in am 128/79, HR 92 BPM   Advised to continue on home regimen  If BP lower than 120/70 cut Hydralazine to 1/2 tab TID  She was advised to follow up with Cardiology in 2-3 weeks  Ms Daniela Romero presents to our office for a follow up visit  She is accompanied by her boyfriend  Sheng Arguelles denies dyspnea with minimal or moderate exertion, CP, palpitations, lightheadedness or dizziness  She is monitoring her BP at home and it has been 120/70  I have reviewed the results of the TTE from the hospital       Medical History   Primary Cardiologist Dr Carter Beckman  Preeclampsia with hypertension  Hx of congenital pulmonic valve stenosis and regurgitation   Anemia   Borderline thrombocytopenia   23 Sp Transverse  section      23 TTE: Mildly increased left ventricular wall thickness normal left ventricular systolic and diastolic function of the left ventricle ejection fraction is 60%  Normal right ventricle size systolic function  Normal left and right atrial size  Normal aortic valve no aortic valve stenosis or regurgitation  Mitral valve not well visualized no regurgitation or stenosis noted  Trace TR  Very mild pulmonic valve stenosis  Mild pulmonic valve regurgitation  No obvious pulmonary hypertension  Trace hemodynamically insignificant circumferential pericardial effusion  Collapsed inferior vena cava suggesting low intravascular volume    Patient Active Problem List   Diagnosis   • Heart murmur   • Physical exam   • Pulmonic stenosis   • Hair loss   • Dermoid cyst of right ovary   • Left wrist tendonitis   • Vertigo   • Status post primary low transverse  section   • Prenatal care in second trimester   • Congenital pulmonary stenosis   • Hereditary disease in family possibly affecting fetus, affecting management of mother, antepartum condition or complication   • Anemia during pregnancy in third trimester   • Pre-eclampsia in third trimester   • Thrombocytopenia affecting pregnancy, antepartum (HCC)   • Positive GBS test     Past Medical History:   Diagnosis Date   • Heart murmur    • Right ovarian cyst 2020   • Varicella      Social History     Socioeconomic History   • Marital status: Single     Spouse name: Not on file   • Number of children: Not on file   • Years of education: Not on file   • Highest education level: Not on file   Occupational History   • Not on file   Tobacco Use   • Smoking status: Never   • Smokeless tobacco: Never   Vaping Use   • Vaping Use: Never used   Substance and Sexual Activity   • Alcohol use: Not Currently   • Drug use: Never   • Sexual activity: Yes     Partners: Male     Birth control/protection: I U D     Other Topics Concern   • Not on file   Social History Narrative   • Not on file     Social Determinants of Health     Financial Resource Strain: Not on file   Food Insecurity: Not on file   Transportation Needs: Not on file   Physical Activity: Not on file   Stress: Not on file   Social Connections: Not on file   Intimate Partner Violence: Not on file   Housing Stability: Not on file      Family History   Problem Relation Age of Onset   • No Known Problems Mother    • No Known Problems Father    • No Known Problems Brother    • Diabetes Maternal Grandmother    • Coronary artery disease Maternal Grandfather    • Lung cancer Paternal Grandmother    • Diabetes Paternal Grandfather    • Breast cancer Paternal Aunt         45s   • Liver cancer Paternal Aunt    • Colon cancer Neg Hx    • Ovarian cancer Neg Hx Past Surgical History:   Procedure Laterality Date   • [facility]  DELIVERY ONLY N/A 2023    Procedure:  SECTION (); Surgeon: Eliud Rajput MD;  Location: [facility];  Service: Obstetrics   • TONSILLECTOMY         Current Outpatient Medications:   •  docusate sodium (COLACE) 100 mg capsule, Take 1 capsule (100 mg total) by mouth 2 (two) times a day, Disp: 60 capsule, Rfl: 3  •  ferrous sulfate 324 (65 Fe) mg, Take 1 tablet (324 mg total) by mouth every other day (Patient not taking: Reported on 2023), Disp: 90 tablet, Rfl: 1  •  hydrALAZINE (APRESOLINE) 25 mg tablet, Take 1 tablet (25 mg total) by mouth 3 (three) times a day, Disp: 90 tablet, Rfl: 2  •  ibuprofen (MOTRIN) 600 mg tablet, Take 1 tablet (600 mg total) by mouth every 6 (six) hours, Disp: 50 tablet, Rfl: 0  •  labetalol (NORMODYNE) 100 mg tablet, Take 1 tablet (100 mg total) by mouth every 12 (twelve) hours, Disp: 60 tablet, Rfl: 3  •  NIFEdipine (PROCARDIA XL) 30 mg 24 hr tablet, Take 2 tablets (60 mg total) by mouth daily Do not start before May 10, 2023 , Disp: 60 tablet, Rfl: 3  •  Prenatal Vit-Fe Fumarate-FA (PRENATAL VITAMINS PO), Take by mouth, Disp: , Rfl:   No Known Allergies    Labs:  No results displayed because visit has over 200 results        Appointment on 2023   Component Date Value   • WBC 2023 12 31 (H)    • RBC 2023 4 00    • Hemoglobin 2023 12 5    • Hematocrit 2023 37 3    • MCV 2023 93    • MCH 2023 31 3    • MCHC 2023 33 5    • RDW 2023 12 6    • Platelets  137 (L)    • MPV 2023 13 9 (H)    • Sodium 2023 137    • Potassium 2023 3 8    • Chloride 2023 104    • CO2 2023 24    • ANION GAP 2023 9    • BUN 2023 12    • Creatinine 2023 0 75    • Glucose, Fasting 2023 98    • Calcium 2023 9 2    • Corrected Calcium 2023 9 7    • AST 2023 27    • ALT 2023 43    • Alkaline Phosphatase 05/04/2023 149 (H)    • Total Protein 05/04/2023 6 6    • Albumin 05/04/2023 3 4 (L)    • Total Bilirubin 05/04/2023 0 28    • eGFR 05/04/2023 112    Ultrasound on 05/04/2023   Component Date Value   • Creatinine, Ur 05/04/2023 212 4    • Protein Urine Random 05/04/2023 129    • Prot/Creat Ratio, Ur 05/04/2023 0 61 (H)    Routine Prenatal on 04/28/2023   Component Date Value   • Strep Grp B PCR 04/28/2023 Positive (A)    Routine Prenatal on 04/18/2023   Component Date Value   • WBC 04/18/2023 10 41 (H)    • RBC 04/18/2023 3 82    • Hemoglobin 04/18/2023 11 8    • Hematocrit 04/18/2023 35 8    • MCV 04/18/2023 94    • MCH 04/18/2023 30 9    • MCHC 04/18/2023 33 0    • RDW 04/18/2023 12 2    • MPV 04/18/2023 14 4 (H)    • Platelets 54/84/5987 160    • nRBC 04/18/2023 0    • Neutrophils Relative 04/18/2023 68    • Immat GRANS % 04/18/2023 1    • Lymphocytes Relative 04/18/2023 20    • Monocytes Relative 04/18/2023 9    • Eosinophils Relative 04/18/2023 2    • Basophils Relative 04/18/2023 0    • Neutrophils Absolute 04/18/2023 7 05    • Immature Grans Absolute 04/18/2023 0 05    • Lymphocytes Absolute 04/18/2023 2 11    • Monocytes Absolute 04/18/2023 0 96    • Eosinophils Absolute 04/18/2023 0 20    • Basophils Absolute 04/18/2023 0 04    • Sodium 04/18/2023 135    • Potassium 04/18/2023 3 9    • Chloride 04/18/2023 109 (H)    • CO2 04/18/2023 19 (L)    • ANION GAP 04/18/2023 7    • BUN 04/18/2023 14    • Creatinine 04/18/2023 0 55 (L)    • Glucose 04/18/2023 88    • Calcium 04/18/2023 9 7    • Corrected Calcium 04/18/2023 10 7 (H)    • AST 04/18/2023 39    • ALT 04/18/2023 65    • Alkaline Phosphatase 04/18/2023 134 (H)    • Total Protein 04/18/2023 7 1    • Albumin 04/18/2023 2 7 (L)    • Total Bilirubin 04/18/2023 0 14 (L)    • eGFR 04/18/2023 132    • Creatinine, Ur 04/18/2023 104 0    • Protein Urine Random 04/18/2023 17    • Prot/Creat Ratio, Ur 04/18/2023 0 16 (H)    Appointment on 04/10/2023   Component Date Value   • WBC 04/10/2023 9 87    • RBC 04/10/2023 3 87    • Hemoglobin 04/10/2023 12 5    • Hematocrit 04/10/2023 36 0    • MCV 04/10/2023 93    • MCH 04/10/2023 32 3    • MCHC 04/10/2023 34 7    • RDW 04/10/2023 12 3    • MPV 04/10/2023 13 4 (H)    • Platelets 39/00/7059 148 (L)    • nRBC 04/10/2023 0    • Neutrophils Relative 04/10/2023 77 (H)    • Immat GRANS % 04/10/2023 0    • Lymphocytes Relative 04/10/2023 16    • Monocytes Relative 04/10/2023 5    • Eosinophils Relative 04/10/2023 2    • Basophils Relative 04/10/2023 0    • Neutrophils Absolute 04/10/2023 7 61    • Immature Grans Absolute 04/10/2023 0 03    • Lymphocytes Absolute 04/10/2023 1 53    • Monocytes Absolute 04/10/2023 0 53    • Eosinophils Absolute 04/10/2023 0 15    • Basophils Absolute 04/10/2023 0 02    • Sodium 04/10/2023 134 (L)    • Potassium 04/10/2023 3 7    • Chloride 04/10/2023 108    • CO2 04/10/2023 21    • ANION GAP 04/10/2023 5    • BUN 04/10/2023 12    • Creatinine 04/10/2023 0 64    • Glucose, Fasting 04/10/2023 128 (H)    • Calcium 04/10/2023 8 9    • Corrected Calcium 04/10/2023 9 9    • AST 04/10/2023 23    • ALT 04/10/2023 49    • Alkaline Phosphatase 04/10/2023 117 (H)    • Total Protein 04/10/2023 6 7    • Albumin 04/10/2023 2 8 (L)    • Total Bilirubin 04/10/2023 0 22    • eGFR 04/10/2023 126    • Uric Acid 04/10/2023 5 1    • Creatinine, Ur 04/10/2023 119 0    • Protein Urine Random 04/10/2023 17    • Prot/Creat Ratio, Ur 04/10/2023 0 14 (H)      Imaging: Echo complete w/ contrast if indicated    Result Date: 5/5/2023  Narrative: Technically difficult but adequate transthoracic echocardiogram study  Mildly increased left ventricular wall thickness, normal left ventricular systolic and diastolic function of the left ventricle ejection fraction is estimated as around 60%  Normal right ventricle size and systolic function  Normal left and right atrial size   Normal aortic valve, no arctic valve stenosis or regurgitation  Mitral valve is not well-visualized  No mitral valve stenosis or regurgitation noted  Trace tricuspid valve regurgitation  Very mild pulmonic valve stenosis  Mild pulmonic valve regurgitation  No obvious pulmonary hypertension  Trace, hemodynamically insignificant circumferential pericardial effusion  Collapsed inferior vena cava suggesting low intravascular volume  Compared to report of previous echocardiogram from 2019 my, trace pericardial effusion is new  Overall there is no significant change in her pulmonic valve stenosis or regurgitation  Review of Systems:  Review of Systems   All other systems reviewed and are negative  Physical Exam:  Physical Exam  Vitals reviewed  Constitutional:       Appearance: Normal appearance  Cardiovascular:      Rate and Rhythm: Normal rate  Pulses: Normal pulses  Heart sounds: Normal heart sounds  Pulmonary:      Effort: Pulmonary effort is normal       Breath sounds: Normal breath sounds  Musculoskeletal:         General: Normal range of motion  Cervical back: Normal range of motion and neck supple  Right lower leg: No edema  Left lower leg: No edema  Skin:     General: Skin is warm and dry  Capillary Refill: Capillary refill takes less than 2 seconds  Neurological:      General: No focal deficit present  Mental Status: She is alert and oriented to person, place, and time  Psychiatric:         Mood and Affect: Mood normal          Behavior: Behavior normal          Discussion/Summary:  # Hx of Pre Eclampsia with hypertension RUE sitting 130/60 continue on hydralazine 25 mg 3 times daily, labetalol 100 mg every 12 hours, nifedipine 60 mg daily  Continue with home blood pressure monitoring if systolic blood pressure drops less than 120 she is aware to decrease hydralazine to 12 5 mg 3 times daily    # congenital pulmonic valve stenosis and regurgitation no change in TTE from prior

## 2023-05-18 ENCOUNTER — OFFICE VISIT (OUTPATIENT)
Dept: CARDIOLOGY CLINIC | Facility: CLINIC | Age: 23
End: 2023-05-18

## 2023-05-18 VITALS
OXYGEN SATURATION: 94 % | WEIGHT: 174 LBS | HEIGHT: 63 IN | BODY MASS INDEX: 30.83 KG/M2 | SYSTOLIC BLOOD PRESSURE: 120 MMHG | DIASTOLIC BLOOD PRESSURE: 64 MMHG | HEART RATE: 95 BPM

## 2023-05-18 DIAGNOSIS — O14.93 PRE-ECLAMPSIA IN THIRD TRIMESTER: Primary | ICD-10-CM

## 2023-05-18 DIAGNOSIS — I37.0 PULMONARY VALVE STENOSIS, UNSPECIFIED ETIOLOGY: ICD-10-CM

## 2023-05-30 ENCOUNTER — OFFICE VISIT (OUTPATIENT)
Dept: FAMILY MEDICINE CLINIC | Facility: CLINIC | Age: 23
End: 2023-05-30

## 2023-05-30 VITALS
DIASTOLIC BLOOD PRESSURE: 70 MMHG | HEIGHT: 63 IN | RESPIRATION RATE: 18 BRPM | BODY MASS INDEX: 30.58 KG/M2 | OXYGEN SATURATION: 98 % | HEART RATE: 83 BPM | SYSTOLIC BLOOD PRESSURE: 100 MMHG | TEMPERATURE: 97.6 F | WEIGHT: 172.6 LBS

## 2023-05-30 DIAGNOSIS — L91.8 SKIN TAG: ICD-10-CM

## 2023-05-30 DIAGNOSIS — O14.93 PRE-ECLAMPSIA IN THIRD TRIMESTER: ICD-10-CM

## 2023-05-30 DIAGNOSIS — E66.09 CLASS 1 OBESITY DUE TO EXCESS CALORIES WITHOUT SERIOUS COMORBIDITY WITH BODY MASS INDEX (BMI) OF 30.0 TO 30.9 IN ADULT: ICD-10-CM

## 2023-05-30 DIAGNOSIS — Z00.00 ANNUAL PHYSICAL EXAM: Primary | ICD-10-CM

## 2023-05-30 NOTE — PROGRESS NOTES
[address] [facility] [place]    NAME: Miryam Lewis  AGE: 20 y o  SEX: female  : 2000     DATE: 2023     Assessment and Plan:     Problem List Items Addressed This Visit        Cardiovascular and Mediastinum    Pre-eclampsia in third trimester     - Blood pressure stable today   -Continue monitoring blood pressure at home  - Continue medications as prescribed  -Continue follow-up with cardiology as scheduled  Musculoskeletal and Integument    Skin tag     - Located underneath left breast  Now bothersome to patient and is interested in removal    - Will have patient RTC with next available resident for removal          Other Visit Diagnoses     Annual physical exam    -  Primary    Class 1 obesity due to excess calories without serious comorbidity with body mass index (BMI) of 30 0 to 30 9 in adult              Immunizations and preventive care screenings were discussed with patient today  Appropriate education was printed on patient's after visit summary  Counseling:  Alcohol/drug use: discussed moderation in alcohol intake, the recommendations for healthy alcohol use, and avoidance of illicit drug use  Dental Health: discussed importance of regular tooth brushing, flossing, and dental visits  Injury prevention: discussed safety/seat belts, safety helmets, smoke detectors, carbon dioxide detectors, and smoking near bedding or upholstery  Sexual health: discussed sexually transmitted diseases, partner selection, use of condoms, avoidance of unintended pregnancy, and contraceptive alternatives  Exercise: the importance of regular exercise/physical activity was discussed  Recommend exercise 3-5 times per week for at least 30 minutes  BMI Counseling: Body mass index is 30 57 kg/m²   The BMI is above normal  Nutrition recommendations include decreasing portion sizes, encouraging healthy choices of fruits and vegetables, consuming healthier snacks, limiting drinks that contain sugar, moderation in carbohydrate intake, increasing intake of lean protein and reducing intake of cholesterol  Exercise recommendations include moderate physical activity 150 minutes/week  No pharmacotherapy was ordered  Rationale for BMI follow-up plan is due to patient being overweight or obese  Return in about 1 year (around 2024) for RTC next available resident skin tag removal, 1 year for Annual physical      Chief Complaint:     Chief Complaint   Patient presents with   • Breast Problem     Wart under her breast, painful      History of Present Illness:     Adult Annual Physical   Patient here for a comprehensive physical exam   Patient recently gave birth to a baby girl via  on 2023  Patient did have preeclampsia  Today, patient notes she is feeling much better and things have been going well at home  Patient notes she is both bottle feeding and breast-feeding  Patient denies any headaches, dizziness, lightheadedness, nausea, vomiting, diarrhea, abdominal pain, vision changes, palpitations, shortness of breath  Patient notes she is still experiencing occasional constipation relieved with Colace     -Patient notes she has a skin lesion located underneath her left breast   Patient notes it has been present for a few months and has been growing  Patient notes it does not cause her any pain, but it is bothersome because it is located where her bra sits on her chest     Diet and Physical Activity  Diet/Nutrition: well balanced diet  Exercise: walking  General Health  Sleep: sleeps well  Hearing: normal - bilateral   Vision: goes for regular eye exams and wears glasses  Dental: regular dental visits  /GYN Health  Contraceptive method: recently gave birth on 23, looking to start birth control at next OBGYN visit       Review of Systems:     Review of Systems   Constitutional: Negative for chills and fever  HENT: Negative for congestion and sore throat  Eyes: Negative for pain  Respiratory: Negative for cough, chest tightness and shortness of breath  Cardiovascular: Negative for chest pain and palpitations  Gastrointestinal: Negative for abdominal pain, constipation, diarrhea, nausea and vomiting  Genitourinary: Negative for difficulty urinating and dysuria  Musculoskeletal: Negative for arthralgias and back pain  Skin: Negative for rash  Skin tag located underneath left breast   Neurological: Negative for dizziness and headaches  Psychiatric/Behavioral: The patient is not nervous/anxious  Past Medical History:     Past Medical History:   Diagnosis Date   • Heart murmur    • Right ovarian cyst 2020   • Varicella       Past Surgical History:     Past Surgical History:   Procedure Laterality Date   • CT  DELIVERY ONLY N/A 2023    Procedure:  SECTION (); Surgeon: Alexander Pompa MD;  Location: [facility];  Service: Obstetrics   • TONSILLECTOMY        Social History:     Social History     Socioeconomic History   • Marital status: Single     Spouse name: None   • Number of children: None   • Years of education: None   • Highest education level: None   Occupational History   • None   Tobacco Use   • Smoking status: Never   • Smokeless tobacco: Never   Vaping Use   • Vaping Use: Never used   Substance and Sexual Activity   • Alcohol use: Not Currently   • Drug use: Never   • Sexual activity: Yes     Partners: Male     Birth control/protection: I U D     Other Topics Concern   • None   Social History Narrative   • None     Social Determinants of Health     Financial Resource Strain: Low Risk  (2022)    Overall Financial Resource Strain (CARDIA)    • Difficulty of Paying Living Expenses: Not hard at all   Food Insecurity: No Food Insecurity (2022)    Hunger Vital Sign    • Worried About Running Out of Food in the Last Year: Never "true    • Ran Out of Food in the Last Year: Never true   Transportation Needs: No Transportation Needs (2/2/2022)    PRAPARE - Transportation    • Lack of Transportation (Medical): No    • Lack of Transportation (Non-Medical): No   Physical Activity: Not on file   Stress: Not on file   Social Connections: Not on file   Intimate Partner Violence: Not on file   Housing Stability: Not on file      Family History:     Family History   Problem Relation Age of Onset   • No Known Problems Mother    • No Known Problems Father    • No Known Problems Brother    • Diabetes Maternal Grandmother    • Coronary artery disease Maternal Grandfather    • Lung cancer Paternal Grandmother    • Diabetes Paternal Grandfather    • Breast cancer Paternal Aunt         45s   • Liver cancer Paternal Aunt    • Colon cancer Neg Hx    • Ovarian cancer Neg Hx       Current Medications:     Current Outpatient Medications   Medication Sig Dispense Refill   • docusate sodium (COLACE) 100 mg capsule Take 1 capsule (100 mg total) by mouth 2 (two) times a day 60 capsule 3   • ferrous sulfate 324 (65 Fe) mg Take 1 tablet (324 mg total) by mouth every other day (Patient not taking: Reported on 5/16/2023) 90 tablet 1   • hydrALAZINE (APRESOLINE) 25 mg tablet Take 1 tablet (25 mg total) by mouth 3 (three) times a day 90 tablet 2   • labetalol (NORMODYNE) 100 mg tablet Take 1 tablet (100 mg total) by mouth every 12 (twelve) hours 60 tablet 3   • NIFEdipine (PROCARDIA XL) 30 mg 24 hr tablet Take 2 tablets (60 mg total) by mouth daily Do not start before May 10, 2023  60 tablet 3   • Prenatal Vit-Fe Fumarate-FA (PRENATAL VITAMINS PO) Take by mouth       No current facility-administered medications for this visit        Allergies:     No Known Allergies   Physical Exam:     /70 (BP Location: Left arm, Patient Position: Sitting, Cuff Size: Standard)   Pulse 83   Temp 97 6 °F (36 4 °C) (Temporal)   Resp 18   Ht 5' 3\" (1 6 m)   Wt 78 3 kg (172 lb 9 6 " oz)   LMP 08/21/2022   SpO2 98%   Breastfeeding Yes   BMI 30 57 kg/m²     Physical Exam  Vitals and nursing note reviewed  Constitutional:       General: She is not in acute distress  Appearance: She is well-developed  HENT:      Head: Normocephalic and atraumatic  Right Ear: External ear normal       Left Ear: External ear normal       Nose: Nose normal    Eyes:      Conjunctiva/sclera: Conjunctivae normal    Cardiovascular:      Rate and Rhythm: Normal rate and regular rhythm  Pulses: Normal pulses  Heart sounds: Normal heart sounds  Pulmonary:      Effort: Pulmonary effort is normal  No respiratory distress  Breath sounds: Normal breath sounds  No wheezing  Chest:       Abdominal:      General: Bowel sounds are normal       Palpations: Abdomen is soft  Tenderness: There is no abdominal tenderness  Musculoskeletal:         General: Normal range of motion  Cervical back: Normal range of motion and neck supple  Skin:     General: Skin is warm and dry  Neurological:      Mental Status: She is alert and oriented to person, place, and time     Psychiatric:         Behavior: Behavior normal          HARISH Jacob

## 2023-05-30 NOTE — ASSESSMENT & PLAN NOTE
- Blood pressure stable today   -Continue monitoring blood pressure at home  - Continue medications as prescribed  -Continue follow-up with cardiology as scheduled

## 2023-05-30 NOTE — ASSESSMENT & PLAN NOTE
- Located underneath left breast  Now bothersome to patient and is interested in removal    - Will have patient RTC with next available resident for removal

## 2023-06-26 ENCOUNTER — POSTPARTUM VISIT (OUTPATIENT)
Dept: OBGYN CLINIC | Facility: MEDICAL CENTER | Age: 23
End: 2023-06-26

## 2023-06-26 VITALS
BODY MASS INDEX: 30.67 KG/M2 | WEIGHT: 173.1 LBS | DIASTOLIC BLOOD PRESSURE: 90 MMHG | HEIGHT: 63 IN | SYSTOLIC BLOOD PRESSURE: 130 MMHG

## 2023-06-26 DIAGNOSIS — O14.93 PRE-ECLAMPSIA IN THIRD TRIMESTER: Primary | ICD-10-CM

## 2023-06-26 PROCEDURE — 99024 POSTOP FOLLOW-UP VISIT: CPT | Performed by: OBSTETRICS & GYNECOLOGY

## 2023-06-26 NOTE — PROGRESS NOTES
Progress Note - OB/GYN  Birgit Lord 20 y o  female MRN: [id number]  Unit/Bed#: L&D 326-01 Encounter: [id number]    Assessment and Plan:  Birgit Lord is a patient of: [facility]  She is PPD# 2 s/p  primary  section, low transverse incision for worsening preeclampsia and subsequent FHR decelerations requiring STAT  Now recovering well and stable  By issue:    * Status post primary low transverse  section  Assessment & Plan   mL  Hgb 12 5 --> 13 0 --> 11 8--> 10 9  Pain well controlled  Passed void trial  Passing gas  Tolerating PO fluids and solids  Breastfeeding  Lochia wnl  • Continue routine postpartum care  • Postpartum DVT ppx with SCDs and Lovenox 40 mg daily (BMI 34)    Thrombocytopenia affecting pregnancy, antepartum (HCC)  Assessment & Plan  Platelets [id number] since admission, stable  · F/u am labs    Pre-eclampsia in third trimester  Assessment & Plan  Diagnosed with preeclampsia with severe features after sustained SRBP on arrival to L&D  Required acute IV treatment intrapartum with Labetalol 20/40/40/80 mg IV and Hydralazine 5/5/10 mg IV on 23 prior to delivery  Started on Procardia XL 30 mg daily on 23 prior to delivery  On magnesium for seizure ppx  Hgb 12 5 (admission) --> 13 0 (immediately post-op) --> 11 8--> 10 9--> am CBC  Plt 137 (admission) --> 153 (immediately post-op) --> 149--> 156  UP:C 0 61  CMP wnl       Systolic (52DBX), EBQ:966 , Min:129 , FXL:804        Diastolic (07VHQ), OND:42, Min:75, Max:91    · Monitor BP  · Continue Procardia  · Monitor for signs/symptoms of worsening preeclampsia  · Follow up am labs  · Continue magnesium for 24 hr PP for seizure ppx    Anemia during pregnancy in third trimester  Assessment & Plan  Admission Hgb 12 5  Congenital pulmonary stenosis  Assessment & Plan  Last maternal echo  with mild stenosis and mild to moderate regurgitation    Fetal cardiology - echo is normal - by maninder Adams Nayeli   · Repeat echo done on 23 stable      Disposition    - Anticipate discharge home on PPD# 3    Subjective/Objective     Chief Complaint: Postpartum State     Subjective:    Latisha Correia is PPD/POD#2 s/p  primary  section, low transverse incision for worsening preeclampsia and subsequent FHR decelerations requiring STAT  She has no current complaints  Pain is well controlled  Patient is currently voiding  She is ambulating  Patient is currently passing flatus and has had no bowel movement  She is tolerating PO solids and liquids, and denies nausea or vomitting  Patient denies fever, chills, chest pain, shortness of breath, or calf tenderness  Lochia is normal  She is breastfeeding  She denies headache, vision changes, chest pain, SOB, RUQ/epigastric pain  She is recovering well and is stable       Objective:     Vitals:   Vitals:    23 2200 23 2307 23 0300   BP: 139/77  129/75 138/74   BP Location:    Left arm   Patient Position:       Cuff Size:       Pulse: 81  75 72   Resp: 18  18 18   Temp: 98 °F (36 7 °C)  98 7 °F (37 1 °C) 98 6 °F (37 °C)   TempSrc: Oral  Oral Oral   SpO2: 94% 94%     Weight:       Height:             Intake/Output Summary (Last 24 hours) at 2023 0307  Last data filed at 2023 0201  Gross per 24 hour   Intake 840 ml   Output 1850 ml   Net -1010 ml       Lab Results   Component Value Date    WBC 20 89 (H) 2023    HGB 10 9 (L) 2023    HCT 32 3 (L) 2023    MCV 95 2023     2023       Physical Exam:   GEN: Latisha Correia appears well, alert and oriented x 3, pleasant and cooperative   CARDIO: RRR, no murmurs or rubs  RESP:  CTAB, no wheezes or rales  ABDOMEN: soft, no tenderness, no distention, fundus @ U -1, Incision C/D/I  EXTREMITIES: SCDs on, non tender, no erythema, b/l Emily's sign negative    Rohit Yang MD  OB/GYN PGY-2  2023  3:07 AM Average

## 2023-06-26 NOTE — PROGRESS NOTES
"OB POSTPARTUM VISIT PROGRESS NOTE  Date of Encounter: 2023    Jack Prather    : 2000  (20 y o )  MR: [id number]    Subjective   Fidencio Kebede is in for her postpartum visit  She is now   She delivered by primary  section  She's generally doing well, denies current pain or bleeding issues, and has no significant depression issues  She is bottle feeding  We discussed all appropriate contraceptive options and she chooses considering kyleena          Objective   EXAM:  GENERAL: alert, well appearing, and in no distress  VITALS: Blood pressure 130/90, height 5' 3\" (1 6 m), weight 78 5 kg (173 lb 1 6 oz), last menstrual period 2022, currently breastfeeding  BMI: Body mass index is 30 66 kg/m²  LUNGS: unlabored breathing    BACK: Normal spine, no CVAT  BREASTS: Deferred  ABDOMEN:soft depressible non tender / incision well healed    EXTREMITIES: All normal  No edema    PELVIC:deferred    Assessment/Plan   Diagnoses and all orders for this visit:    Pre-eclampsia in third trimester  At time of discharge from hospital was on BP meds which she has since stopped secondary to low pressure   Today BP  130/90 - but states was having a very hectic day  She has follow up with PCP and cardiology in July   We discussed having a follow up for IUD placement after her visits with both doctors to make sure BP ok    We discussed risks of Kyleena placement vs benefit (Had IUD previously)   6 weeks postpartum follow-up        Andrew Lancaster MD  "

## 2023-06-27 ENCOUNTER — PROCEDURE VISIT (OUTPATIENT)
Dept: FAMILY MEDICINE CLINIC | Facility: CLINIC | Age: 23
End: 2023-06-27

## 2023-06-27 VITALS
WEIGHT: 177 LBS | RESPIRATION RATE: 16 BRPM | HEIGHT: 63 IN | SYSTOLIC BLOOD PRESSURE: 112 MMHG | BODY MASS INDEX: 31.36 KG/M2 | TEMPERATURE: 98.7 F | HEART RATE: 92 BPM | OXYGEN SATURATION: 97 % | DIASTOLIC BLOOD PRESSURE: 70 MMHG

## 2023-06-27 DIAGNOSIS — L91.8 SKIN TAG: Primary | ICD-10-CM

## 2023-06-27 DIAGNOSIS — I37.0 NONRHEUMATIC PULMONARY VALVE STENOSIS: ICD-10-CM

## 2023-06-27 NOTE — PROGRESS NOTES
Name: Mirza Jaramillo      : 2000      MRN: [id number]  Encounter Provider: Author Sam MD  Encounter Date: 2023   Encounter department: [address] [place], [place] [address]     1  Skin tag  Assessment & Plan:  - Located underneath left breast  Now bothersome to patient and is interested in removal        Orders:  -     Skin tag removal    2  Nonrheumatic pulmonary valve stenosis  Assessment & Plan:  Follows with cardiology  Next appointment in 2023  Subjective      Mirza Jaramillo is a very pleasant 20 y o  female who has a PMH of preeclampsia, pulmonic stenosis and presents today for skin tag removal  Patient's chronic medical conditions are stable unless noted otherwise above  This patient has had no admissions to hospital or medical emergencies since the last visit to our office  Patient has no further complaints other than what is mentioned below and in the ROS  Skin tag presented during pregnancy  Persisting 2 months postpartum although reports somewhat decreasing in size  Family history notable for skin tags  Patient has stopped antihypertensive medications needs since May 2023 with well-controlled blood pressure at this time      Review of Systems   Skin:        Skin tag under left breast       Current Outpatient Medications on File Prior to Visit   Medication Sig   • Prenatal Vit-Fe Fumarate-FA (PRENATAL VITAMINS PO) Take by mouth   • [DISCONTINUED] docusate sodium (COLACE) 100 mg capsule Take 1 capsule (100 mg total) by mouth 2 (two) times a day (Patient not taking: Reported on 2023)   • [DISCONTINUED] ferrous sulfate 324 (65 Fe) mg Take 1 tablet (324 mg total) by mouth every other day (Patient not taking: Reported on 2023)   • [DISCONTINUED] hydrALAZINE (APRESOLINE) 25 mg tablet Take 1 tablet (25 mg total) by mouth 3 (three) times a day (Patient not taking: Reported on 2023)   • [DISCONTINUED] labetalol (Corey Regalado) "100 mg tablet Take 1 tablet (100 mg total) by mouth every 12 (twelve) hours (Patient not taking: Reported on 6/26/2023)   • [DISCONTINUED] NIFEdipine (PROCARDIA XL) 30 mg 24 hr tablet Take 2 tablets (60 mg total) by mouth daily Do not start before May 10, 2023  (Patient not taking: Reported on 6/26/2023)       Objective     /70 (BP Location: Right arm, Patient Position: Sitting, Cuff Size: Standard)   Pulse 92   Temp 98 7 °F (37 1 °C) (Temporal)   Resp 16   Ht 5' 3\" (1 6 m)   Wt 80 3 kg (177 lb)   LMP 06/27/2023   SpO2 97%   Breastfeeding No   BMI 31 35 kg/m²     Physical Exam  Vitals reviewed  Constitutional:       General: She is not in acute distress  Appearance: She is obese  Eyes:      Conjunctiva/sclera: Conjunctivae normal    Cardiovascular:      Rate and Rhythm: Normal rate  Pulmonary:      Effort: Pulmonary effort is normal    Musculoskeletal:         General: Normal range of motion  Skin:     General: Skin is dry  Neurological:      General: No focal deficit present  Mental Status: She is alert  Psychiatric:         Behavior: Behavior normal         Skin tag removal    Date/Time: 6/27/2023 1:20 PM    Performed by: Sonya Jacobo MD  Authorized by: Dennise Ospina MD  Universal Protocol:  Procedure performed by:  Consent: Verbal consent obtained  Written consent not obtained    Risks and benefits: risks, benefits and alternatives were discussed  Consent given by: patient  Patient understanding: patient states understanding of the procedure being performed  Patient consent: the patient's understanding of the procedure matches consent given  Procedure consent: procedure consent matches procedure scheduled  Patient identity confirmed: verbally with patient        Procedure Details - Skin Tag Destruction:     Up to 15      Total (if more than 15):  1    Body area:  Trunk    Trunk location:  L breast (Under)    Initial size (mm):  2    Final defect size (mm):  2    " Malignancy: benign lesion      Destruction method: scissors used for extraction    Lesion 6:      0 1cc Xylocaine 1% with epinephrine utilized for anesthesia injected with tuberculin needle and electrocautery utilized for coagulation and destruction of base  Procedure well tolerated with no complications or adverse reactions to medications noted during or immediately afterwards            Maycol Lombardi MD

## 2023-08-04 ENCOUNTER — PROCEDURE VISIT (OUTPATIENT)
Dept: OBGYN CLINIC | Facility: MEDICAL CENTER | Age: 23
End: 2023-08-04
Payer: COMMERCIAL

## 2023-08-04 VITALS
HEIGHT: 63 IN | SYSTOLIC BLOOD PRESSURE: 124 MMHG | DIASTOLIC BLOOD PRESSURE: 80 MMHG | BODY MASS INDEX: 31.18 KG/M2 | WEIGHT: 176 LBS

## 2023-08-04 DIAGNOSIS — Z30.430 ENCOUNTER FOR IUD INSERTION: Primary | ICD-10-CM

## 2023-08-04 LAB — SL AMB POCT URINE HCG: NEGATIVE

## 2023-08-04 PROCEDURE — 81025 URINE PREGNANCY TEST: CPT | Performed by: OBSTETRICS & GYNECOLOGY

## 2023-08-04 PROCEDURE — 58300 INSERT INTRAUTERINE DEVICE: CPT | Performed by: OBSTETRICS & GYNECOLOGY

## 2023-08-04 NOTE — PROGRESS NOTES
Iud insertions    Date/Time: 8/4/2023 11:00 AM    Performed by: Bud Jolley MD  Authorized by:  Bud Jolley MD    Other Assisting Provider: No    Verbal consent obtained?: Yes    Written consent obtained?: Yes    Risks and benefits: Risks, benefits and alternatives were discussed    Consent given by:  Patient  Time Out:     Time out: Immediately prior to the procedure a time out was called    Patient states understanding of procedure being performed: Yes    Patient's understanding of procedure matches consent: Yes    Procedure consent matches procedure scheduled: Yes    Relevant documents present and verified: Yes    Test results available and properly labeled: Yes    Patient identity confirmed:  Verbally with patient  Procedure:     Pelvic exam performed: yes      Negative urine pregnancy test: yes      Cervix cleaned and prepped: yes      Speculum placed in vagina: yes      Tenaculum applied to cervix: yes      Uterus sounded: yes      Uterus sound depth (cm):  8    IUD inserted with no complications: yes      IUD type:  Marissa [place]    Strings trimmed: yes    Post-procedure:     Patient tolerated procedure well: yes      Patient will follow up after next period: yes

## 2023-08-22 ENCOUNTER — APPOINTMENT (OUTPATIENT)
Dept: LAB | Facility: CLINIC | Age: 23
End: 2023-08-22

## 2023-08-22 ENCOUNTER — OCCMED (OUTPATIENT)
Dept: URGENT CARE | Facility: CLINIC | Age: 23
End: 2023-08-22

## 2023-08-22 DIAGNOSIS — Z02.1 PRE-EMPLOYMENT HEALTH SCREENING EXAMINATION: ICD-10-CM

## 2023-08-22 DIAGNOSIS — Z02.1 PRE-EMPLOYMENT HEALTH SCREENING EXAMINATION: Primary | ICD-10-CM

## 2023-08-22 LAB
MEV IGG SER QL IA: ABNORMAL
MUV IGG SER QL IA: NORMAL
RUBV IGG SERPL IA-ACNC: 21.4 IU/ML
VZV IGG SER QL IA: ABNORMAL

## 2023-08-22 PROCEDURE — 86735 MUMPS ANTIBODY: CPT

## 2023-08-22 PROCEDURE — 36415 COLL VENOUS BLD VENIPUNCTURE: CPT

## 2023-08-22 PROCEDURE — 86787 VARICELLA-ZOSTER ANTIBODY: CPT

## 2023-08-22 PROCEDURE — 86762 RUBELLA ANTIBODY: CPT

## 2023-08-22 PROCEDURE — 86480 TB TEST CELL IMMUN MEASURE: CPT

## 2023-08-22 PROCEDURE — 86765 RUBEOLA ANTIBODY: CPT

## 2023-08-24 LAB
GAMMA INTERFERON BACKGROUND BLD IA-ACNC: 0.02 IU/ML
M TB IFN-G BLD-IMP: NEGATIVE
M TB IFN-G CD4+ BCKGRND COR BLD-ACNC: 0 IU/ML
M TB IFN-G CD4+ BCKGRND COR BLD-ACNC: 0 IU/ML
MITOGEN IGNF BCKGRD COR BLD-ACNC: >10 IU/ML

## 2023-09-05 ENCOUNTER — OFFICE VISIT (OUTPATIENT)
Dept: OBGYN CLINIC | Facility: MEDICAL CENTER | Age: 23
End: 2023-09-05
Payer: COMMERCIAL

## 2023-09-05 VITALS
BODY MASS INDEX: 30.3 KG/M2 | HEIGHT: 63 IN | DIASTOLIC BLOOD PRESSURE: 82 MMHG | SYSTOLIC BLOOD PRESSURE: 128 MMHG | WEIGHT: 171 LBS

## 2023-09-05 DIAGNOSIS — Z30.431 IUD CHECK UP: Primary | ICD-10-CM

## 2023-09-05 PROCEDURE — 99212 OFFICE O/P EST SF 10 MIN: CPT | Performed by: OBSTETRICS & GYNECOLOGY

## 2023-09-05 NOTE — PROGRESS NOTES
Assessment Ivy Gonzalez was seen today for follow-up. Diagnoses and all orders for this visit:    IUD check up         Plan  IUD in place   Will follow up for yearly visit    Subjective   Saqib Sidhu is a 20 y.o. female here for a IUD string check  No pain since placement but now having some period like cramps   Also having some spotting      Patient Active Problem List   Diagnosis   • Heart murmur   • Physical exam   • Pulmonic stenosis   • Hair loss   • Dermoid cyst of right ovary   • Left wrist tendonitis   • Vertigo   • Status post primary low transverse  section   • Prenatal care in second trimester   • Congenital pulmonary stenosis   • Hereditary disease in family possibly affecting fetus, affecting management of mother, antepartum condition or complication   • Anemia during pregnancy in third trimester   • Pre-eclampsia in third trimester   • Thrombocytopenia affecting pregnancy, antepartum ([address])   • Positive GBS test   • Skin tag       Gynecologic History  Patient's last menstrual period was 2023 (exact date). The current method of family planning is IUD. Past Medical History:   Diagnosis Date   • Heart murmur    • Right ovarian cyst 2020   • Varicella      Past Surgical History:   Procedure Laterality Date   • MD  DELIVERY ONLY N/A 2023    Procedure:  SECTION ();   Surgeon: Raghav Velez MD;  Location: [facility];  Service: Obstetrics   • TONSILLECTOMY       Family History   Problem Relation Age of Onset   • No Known Problems Mother    • No Known Problems Father    • No Known Problems Brother    • Diabetes Maternal Grandmother    • Coronary artery disease Maternal Grandfather    • Lung cancer Paternal Grandmother    • Diabetes Paternal Grandfather    • Breast cancer Paternal Aunt         45s   • Liver cancer Paternal Aunt    • Colon cancer Neg Hx    • Ovarian cancer Neg Hx      Social History     Socioeconomic History   • Marital status: Single Spouse name: Not on file   • Number of children: Not on file   • Years of education: Not on file   • Highest education level: Not on file   Occupational History   • Not on file   Tobacco Use   • Smoking status: Never   • Smokeless tobacco: Never   Vaping Use   • Vaping Use: Never used   Substance and Sexual Activity   • Alcohol use: Not Currently   • Drug use: Never   • Sexual activity: Yes     Partners: Male     Birth control/protection: I.U.D. Other Topics Concern   • Not on file   Social History Narrative   • Not on file     Social Determinants of Health     Financial Resource Strain: Low Risk  (2/2/2022)    Overall Financial Resource Strain (CARDIA)    • Difficulty of Paying Living Expenses: Not hard at all   Food Insecurity: No Food Insecurity (2/2/2022)    Hunger Vital Sign    • Worried About Running Out of Food in the Last Year: Never true    • Ran Out of Food in the Last Year: Never true   Transportation Needs: No Transportation Needs (2/2/2022)    PRAPARE - Transportation    • Lack of Transportation (Medical): No    • Lack of Transportation (Non-Medical): No   Physical Activity: Not on file   Stress: Not on file   Social Connections: Not on file   Intimate Partner Violence: Not on file   Housing Stability: Not on file     No Known Allergies    Current Outpatient Medications:   •  Prenatal Vit-Fe Fumarate-FA (PRENATAL VITAMINS PO), Take by mouth, Disp: , Rfl:     Review of Systems  Constitutional :no fever, feels well, no tiredness, no recent weight gain or loss  ENT: no ear ache, no loss of hearing, no nosebleeds or nasal discharge, no sore throat or hoarseness. Cardiovascular: no complaints of slow or fast heart beat, no chest pain, no palpitations, no leg claudication or lower extremity edema.   Respiratory: no complaints of shortness of shortness of breath, no ARIAS  Breasts:no complaints of breast pain, breast lump, or nipple discharge  Gastrointestinal: no complaints of abdominal pain, constipation, nausea, vomiting, or diarrhea or bloody stools  Genitourinary : no complaints of dysuria, incontinence, pelvic pain, no dysmenorrhea, vaginal discharge or abnormal vaginal bleeding and as noted in HPI. Musculoskeletal: no complaints of arthralgia, no myalgia, no joint swelling or stiffness, no limb pain or swelling. Integumentary: no complaints of skin rash or lesion, itching or dry skin  Neurological: no complaints of headache, no confusion, no numbness or tingling, no dizziness or fainting     Objective     /82   Ht 5' 3" (1.6 m)   Wt 77.6 kg (171 lb)   LMP 08/07/2023 (Exact Date)   BMI 30.29 kg/m²     General:   appears stated age, cooperative, alert normal mood and affect   Lungs: Unlabored breathing     Abdomen: soft, non-tender, without masses or organomegaly   Vulva: normal   Vagina: normal vagina, no discharge, exudate, lesion, or erythema   Urethra: normal   Cervix: Normal, no discharge. IUD strings present. Uterus: normal size, contour, position, consistency, mobility, non-tender   Adnexa: no mass, fullness, tenderness   Lymphatic palpation of lymph nodes in neck, axilla, groin and/or other locations: no lymphadenopathy or masses noted   Skin normal skin turgor and no rashes.    Psychiatric orientation to person, place, and time: normal. mood and affect: normal

## 2023-10-17 ENCOUNTER — PATIENT MESSAGE (OUTPATIENT)
Dept: OBGYN CLINIC | Facility: MEDICAL CENTER | Age: 23
End: 2023-10-17

## 2023-10-17 DIAGNOSIS — R51.9 GENERALIZED HEADACHES: ICD-10-CM

## 2023-10-17 DIAGNOSIS — R53.83 FATIGUE, UNSPECIFIED TYPE: Primary | ICD-10-CM

## 2023-10-17 DIAGNOSIS — R41.89 SLUGGISHNESS: ICD-10-CM

## 2023-10-18 ENCOUNTER — APPOINTMENT (OUTPATIENT)
Dept: LAB | Facility: CLINIC | Age: 23
End: 2023-10-18
Payer: COMMERCIAL

## 2023-10-18 DIAGNOSIS — R51.9 GENERALIZED HEADACHES: ICD-10-CM

## 2023-10-18 DIAGNOSIS — R41.89 SLUGGISHNESS: ICD-10-CM

## 2023-10-18 DIAGNOSIS — R53.83 FATIGUE, UNSPECIFIED TYPE: ICD-10-CM

## 2023-10-18 LAB
25(OH)D3 SERPL-MCNC: 22.4 NG/ML (ref 30–100)
ALBUMIN SERPL BCP-MCNC: 4.3 G/DL (ref 3.5–5)
ALP SERPL-CCNC: 69 U/L (ref 34–104)
ALT SERPL W P-5'-P-CCNC: 19 U/L (ref 7–52)
ANION GAP SERPL CALCULATED.3IONS-SCNC: 6 MMOL/L
AST SERPL W P-5'-P-CCNC: 15 U/L (ref 13–39)
BASOPHILS # BLD AUTO: 0.04 THOUSANDS/ÂΜL (ref 0–0.1)
BASOPHILS NFR BLD AUTO: 1 % (ref 0–1)
BILIRUB SERPL-MCNC: 0.46 MG/DL (ref 0.2–1)
BUN SERPL-MCNC: 18 MG/DL (ref 5–25)
CALCIUM SERPL-MCNC: 9.6 MG/DL (ref 8.4–10.2)
CHLORIDE SERPL-SCNC: 106 MMOL/L (ref 96–108)
CO2 SERPL-SCNC: 27 MMOL/L (ref 21–32)
CREAT SERPL-MCNC: 0.61 MG/DL (ref 0.6–1.3)
EOSINOPHIL # BLD AUTO: 0.13 THOUSAND/ÂΜL (ref 0–0.61)
EOSINOPHIL NFR BLD AUTO: 2 % (ref 0–6)
ERYTHROCYTE [DISTWIDTH] IN BLOOD BY AUTOMATED COUNT: 12.6 % (ref 11.6–15.1)
FERRITIN SERPL-MCNC: 34 NG/ML (ref 11–307)
GFR SERPL CREATININE-BSD FRML MDRD: 128 ML/MIN/1.73SQ M
GLUCOSE P FAST SERPL-MCNC: 91 MG/DL (ref 65–99)
HCT VFR BLD AUTO: 40.4 % (ref 34.8–46.1)
HGB BLD-MCNC: 13.2 G/DL (ref 11.5–15.4)
IMM GRANULOCYTES # BLD AUTO: 0.02 THOUSAND/UL (ref 0–0.2)
IMM GRANULOCYTES NFR BLD AUTO: 0 % (ref 0–2)
LYMPHOCYTES # BLD AUTO: 2.02 THOUSANDS/ÂΜL (ref 0.6–4.47)
LYMPHOCYTES NFR BLD AUTO: 28 % (ref 14–44)
MCH RBC QN AUTO: 30.3 PG (ref 26.8–34.3)
MCHC RBC AUTO-ENTMCNC: 32.7 G/DL (ref 31.4–37.4)
MCV RBC AUTO: 93 FL (ref 82–98)
MONOCYTES # BLD AUTO: 0.5 THOUSAND/ÂΜL (ref 0.17–1.22)
MONOCYTES NFR BLD AUTO: 7 % (ref 4–12)
NEUTROPHILS # BLD AUTO: 4.49 THOUSANDS/ÂΜL (ref 1.85–7.62)
NEUTS SEG NFR BLD AUTO: 62 % (ref 43–75)
NRBC BLD AUTO-RTO: 0 /100 WBCS
PLATELET # BLD AUTO: 251 THOUSANDS/UL (ref 149–390)
PMV BLD AUTO: 11.9 FL (ref 8.9–12.7)
POTASSIUM SERPL-SCNC: 4.2 MMOL/L (ref 3.5–5.3)
PROT SERPL-MCNC: 7.4 G/DL (ref 6.4–8.4)
RBC # BLD AUTO: 4.35 MILLION/UL (ref 3.81–5.12)
SODIUM SERPL-SCNC: 139 MMOL/L (ref 135–147)
TSH SERPL DL<=0.05 MIU/L-ACNC: 1.01 UIU/ML (ref 0.45–4.5)
VIT B12 SERPL-MCNC: 405 PG/ML (ref 180–914)
WBC # BLD AUTO: 7.2 THOUSAND/UL (ref 4.31–10.16)

## 2023-10-18 PROCEDURE — 82306 VITAMIN D 25 HYDROXY: CPT

## 2023-10-18 PROCEDURE — 84443 ASSAY THYROID STIM HORMONE: CPT

## 2023-10-18 PROCEDURE — 80053 COMPREHEN METABOLIC PANEL: CPT

## 2023-10-18 PROCEDURE — 36415 COLL VENOUS BLD VENIPUNCTURE: CPT

## 2023-10-18 PROCEDURE — 85025 COMPLETE CBC W/AUTO DIFF WBC: CPT

## 2023-10-18 PROCEDURE — 82728 ASSAY OF FERRITIN: CPT

## 2023-10-18 PROCEDURE — 82607 VITAMIN B-12: CPT

## 2023-10-19 ENCOUNTER — TELEPHONE (OUTPATIENT)
Dept: FAMILY MEDICINE CLINIC | Facility: CLINIC | Age: 23
End: 2023-10-19

## 2023-10-19 ENCOUNTER — OFFICE VISIT (OUTPATIENT)
Dept: FAMILY MEDICINE CLINIC | Facility: CLINIC | Age: 23
End: 2023-10-19

## 2023-10-19 VITALS
TEMPERATURE: 98.3 F | HEART RATE: 88 BPM | SYSTOLIC BLOOD PRESSURE: 122 MMHG | OXYGEN SATURATION: 98 % | RESPIRATION RATE: 18 BRPM | WEIGHT: 180 LBS | HEIGHT: 63 IN | BODY MASS INDEX: 31.89 KG/M2 | DIASTOLIC BLOOD PRESSURE: 84 MMHG

## 2023-10-19 DIAGNOSIS — R42 VERTIGO: ICD-10-CM

## 2023-10-19 DIAGNOSIS — E55.9 VITAMIN D DEFICIENCY: Primary | ICD-10-CM

## 2023-10-19 RX ORDER — MECLIZINE HYDROCHLORIDE 25 MG/1
25 TABLET ORAL EVERY 8 HOURS PRN
Qty: 30 TABLET | Refills: 1 | Status: SHIPPED | OUTPATIENT
Start: 2023-10-19

## 2023-10-19 RX ORDER — ERGOCALCIFEROL 1.25 MG/1
50000 CAPSULE ORAL WEEKLY
Qty: 12 CAPSULE | Refills: 1 | Status: SHIPPED | OUTPATIENT
Start: 2023-10-19

## 2023-10-19 NOTE — ASSESSMENT & PLAN NOTE
- Symptoms similar to vertigo. - Will restart meclizine 25 mg, to be taken 3 times daily as needed for dizziness.  - Advised patient to stay well hydrated throughout the day and to eat small meals several times a day. - Advised to contact the office if symptoms persist or worsen.

## 2023-10-19 NOTE — TELEPHONE ENCOUNTER
Hello, my name is Nicole Lerma. Date of birth 2000. If someone could please give me a call back [phone], again [phone]. Thank you. Patient was scheduled and seen.

## 2023-10-19 NOTE — PROGRESS NOTES
Assessment/Plan:    Vertigo  - Symptoms similar to vertigo. - Will restart meclizine 25 mg, to be taken 3 times daily as needed for dizziness.  - Advised patient to stay well hydrated throughout the day and to eat small meals several times a day. - Advised to contact the office if symptoms persist or worsen. Vitamin D deficiency  - Reviewed recent vitamin D level which is low. - Will start vitamin D 50,000 units once weekly. Diagnoses and all orders for this visit:    Vitamin D deficiency  -     ergocalciferol (VITAMIN D2) 50,000 units; Take 1 capsule (50,000 Units total) by mouth once a week    Vertigo  -     meclizine (ANTIVERT) 25 mg tablet; Take 1 tablet (25 mg total) by mouth every 8 (eight) hours as needed for dizziness        All of patients questions were answered. Patient understands and agrees with the above plan. Return in about 2 months (around 12/19/2023) for Next scheduled follow up fatigue. Taya Cota PA-C  10/19/23  [address]  [place]          Subjective:     Patient ID: Ailyn Adams  is a 20 y.o. female with known PMH of pulmonary stenosis who presents today in office for dizziness.     - Patient is a 20 y.o. female who presents today for dizziness. Patient notes for the past few days she has been experiencing dizziness. Patient notes it mainly occurs with quick movements of her head. Patient notes she previously experienced this and was told it was vertigo. Patient notes she took a dose of meclizine from her mom, but did not find it effective. She was unsure of the dose. -In addition, patient notes she has been feeling sluggish and fatigued since she had her new IUD placed on August 4 of this year. Patient notes this IUD is El Goltz and her previous 1 before her pregnancy was Mirena.       The following portions of the patient's history were reviewed and updated as appropriate: allergies, current medications, past family history, past medical history, past social history, past surgical history, and problem list.        Review of Systems   Constitutional:  Positive for fatigue. Negative for chills and fever. HENT:  Negative for congestion, ear pain, rhinorrhea and sore throat. Eyes:  Negative for pain. Respiratory:  Negative for cough, chest tightness and shortness of breath. Cardiovascular:  Negative for chest pain, palpitations and leg swelling. Gastrointestinal:  Negative for abdominal pain, constipation, diarrhea, nausea and vomiting. Genitourinary:  Negative for difficulty urinating and dysuria. Musculoskeletal:  Negative for arthralgias. Skin:  Negative for rash. Neurological:  Positive for dizziness and headaches. Psychiatric/Behavioral:  The patient is not nervous/anxious. Objective:   Vitals:    10/19/23 0953   BP: 122/84   BP Location: Right arm   Patient Position: Sitting   Cuff Size: Standard   Pulse: 88   Resp: 18   Temp: 98.3 °F (36.8 °C)   TempSrc: Temporal   SpO2: 98%   Weight: 81.6 kg (180 lb)   Height: 5' 3" (1.6 m)         Physical Exam  Vitals and nursing note reviewed. Constitutional:       General: She is not in acute distress. Appearance: She is well-developed. HENT:      Head: Normocephalic and atraumatic. Right Ear: External ear normal.      Left Ear: External ear normal.      Nose: Nose normal.   Eyes:      Conjunctiva/sclera: Conjunctivae normal.   Cardiovascular:      Rate and Rhythm: Normal rate and regular rhythm. Pulses: Normal pulses. Heart sounds: Normal heart sounds. Pulmonary:      Effort: Pulmonary effort is normal. No respiratory distress. Breath sounds: Normal breath sounds. No wheezing. Musculoskeletal:      Cervical back: Normal range of motion and neck supple. Skin:     General: Skin is warm and dry. Neurological:      Mental Status: She is alert and oriented to person, place, and time.    Psychiatric:         Behavior: Behavior normal.           PHQ-2/9 Depression Screening    Little interest or pleasure in doing things: 0 - not at all  Feeling down, depressed, or hopeless: 0 - not at all  PHQ-2 Score: 0  PHQ-2 Interpretation: Negative depression screen

## 2023-10-31 NOTE — PLAN OF CARE

## 2023-11-13 ENCOUNTER — OFFICE VISIT (OUTPATIENT)
Dept: OBGYN CLINIC | Facility: CLINIC | Age: 23
End: 2023-11-13
Payer: COMMERCIAL

## 2023-11-13 VITALS
HEIGHT: 63 IN | SYSTOLIC BLOOD PRESSURE: 128 MMHG | BODY MASS INDEX: 32.07 KG/M2 | WEIGHT: 181 LBS | DIASTOLIC BLOOD PRESSURE: 82 MMHG

## 2023-11-13 DIAGNOSIS — N92.6 IRREGULAR MENSES: ICD-10-CM

## 2023-11-13 DIAGNOSIS — Z97.5 BREAKTHROUGH BLEEDING WITH IUD: Primary | ICD-10-CM

## 2023-11-13 DIAGNOSIS — N92.1 BREAKTHROUGH BLEEDING WITH IUD: Primary | ICD-10-CM

## 2023-11-13 PROCEDURE — 99213 OFFICE O/P EST LOW 20 MIN: CPT | Performed by: OBSTETRICS & GYNECOLOGY

## 2023-11-13 NOTE — PROGRESS NOTES
Assessment Diagnoses and all orders for this visit:    Breakthrough bleeding with IUD  -     US pelvis complete w transvaginal; Future    Irregular menses    We discussed possibly add back therapy with OCP for some months to see if improvement of symptoms vs expectant management as it is common to have breakthrough bleeding on IUD first couple months of use   Although reassuring that I can see her strings,  we discussed and ordered and US to verify correct positioning. Plan    Subjective   Ke Puente is a 20 y.o. female here for a problem visit. Patient is complaining of irregular spotting since placement of kyleena 3 months ago. States she has spotting every other week and at times some bleeding . States  she has also developed vertigo who she saw her PCP for and is on meds, unsure if related to IUD . Patient Active Problem List   Diagnosis    Heart murmur    Physical exam    Pulmonic stenosis    Hair loss    Dermoid cyst of right ovary    Left wrist tendonitis    Vertigo    Status post primary low transverse  section    Prenatal care in second trimester    Congenital pulmonary stenosis    Hereditary disease in family possibly affecting fetus, affecting management of mother, antepartum condition or complication    Anemia during pregnancy in third trimester    Pre-eclampsia in third trimester    Thrombocytopenia affecting pregnancy, antepartum (HCC)    Positive GBS test    Skin tag    Vitamin D deficiency       Gynecologic History  No LMP recorded (lmp unknown). Patient has had an implant. The current method of family planning is IUD. Past Medical History:   Diagnosis Date    Heart murmur     Right ovarian cyst 2020    Varicella      Past Surgical History:   Procedure Laterality Date    [place]  DELIVERY ONLY N/A 2023    Procedure:  SECTION ();   Surgeon: Kayce Navarrete MD;  Location: [facility];  Service: Obstetrics    TONSILLECTOMY       Family History Problem Relation Age of Onset    No Known Problems Mother     No Known Problems Father     No Known Problems Brother     Diabetes Maternal Grandmother     Coronary artery disease Maternal Grandfather     Lung cancer Paternal Grandmother     Diabetes Paternal Grandfather     Breast cancer Paternal Aunt         45s    Liver cancer Paternal Aunt     Colon cancer Neg Hx     Ovarian cancer Neg Hx      Social History     Socioeconomic History    Marital status: Single     Spouse name: Not on file    Number of children: Not on file    Years of education: Not on file    Highest education level: Not on file   Occupational History    Not on file   Tobacco Use    Smoking status: Never     Passive exposure: Never    Smokeless tobacco: Never   Vaping Use    Vaping Use: Never used   Substance and Sexual Activity    Alcohol use: Not Currently    Drug use: Never    Sexual activity: Yes     Partners: Male     Birth control/protection: I.U.D. Other Topics Concern    Not on file   Social History Narrative    Not on file     Social Determinants of Health     Financial Resource Strain: Low Risk  (2/2/2022)    Overall Financial Resource Strain (CARDIA)     Difficulty of Paying Living Expenses: Not hard at all   Food Insecurity: No Food Insecurity (2/2/2022)    Hunger Vital Sign     Worried About Running Out of Food in the Last Year: Never true     Ran Out of Food in the Last Year: Never true   Transportation Needs: No Transportation Needs (2/2/2022)    PRAPARE - Transportation     Lack of Transportation (Medical): No     Lack of Transportation (Non-Medical):  No   Physical Activity: Not on file   Stress: Not on file   Social Connections: Not on file   Intimate Partner Violence: Not on file   Housing Stability: Not on file     No Known Allergies    Current Outpatient Medications:     ergocalciferol (VITAMIN D2) 50,000 units, Take 1 capsule (50,000 Units total) by mouth once a week, Disp: 12 capsule, Rfl: 1    meclizine (ANTIVERT) 25 mg tablet, Take 1 tablet (25 mg total) by mouth every 8 (eight) hours as needed for dizziness, Disp: 30 tablet, Rfl: 1    Prenatal Vit-Fe Fumarate-FA (PRENATAL VITAMINS PO), Take by mouth, Disp: , Rfl:     Review of Systems  Constitutional :no fever, feels well, no tiredness, no recent weight gain or loss  ENT: no ear ache, no loss of hearing, no nosebleeds or nasal discharge, no sore throat or hoarseness. Cardiovascular: no complaints of slow or fast heart beat, no chest pain, no palpitations, no leg claudication or lower extremity edema. Respiratory: no complaints of shortness of shortness of breath, no ARIAS  Breasts:no complaints of breast pain, breast lump, or nipple discharge  Gastrointestinal: no complaints of abdominal pain, constipation, nausea, vomiting, or diarrhea or bloody stools  Genitourinary :as noted in HPI. Musculoskeletal: no complaints of arthralgia, no myalgia, no joint swelling or stiffness, no limb pain or swelling. Integumentary: no complaints of skin rash or lesion, itching or dry skin  Neurological: no complaints of headache, no confusion, no numbness or tingling, no dizziness or fainting     Objective     /82   Ht 5' 3" (1.6 m)   Wt 82.1 kg (181 lb)   LMP  (LMP Unknown)   BMI 32.06 kg/m²     General:   appears stated age, cooperative, alert normal mood and affect   Lungs: Unlabored breathing     Abdomen: soft, non-tender, without masses or organomegaly   Vulva: normal, normal female genitalia, no clitoral enlargement   Vagina: normal vagina, no discharge, exudate, lesion, or erythema   Urethra: normal   Cervix: Normal, no discharge. IUD strings present. Skin normal skin turgor and no rashes.    Psychiatric orientation to person, place, and time: normal. mood and affect: normal

## 2023-11-21 ENCOUNTER — OFFICE VISIT (OUTPATIENT)
Dept: OBGYN CLINIC | Facility: CLINIC | Age: 23
End: 2023-11-21

## 2023-11-21 VITALS
SYSTOLIC BLOOD PRESSURE: 126 MMHG | HEIGHT: 63 IN | DIASTOLIC BLOOD PRESSURE: 86 MMHG | BODY MASS INDEX: 32.07 KG/M2 | WEIGHT: 181 LBS | HEART RATE: 73 BPM | RESPIRATION RATE: 18 BRPM

## 2023-11-21 DIAGNOSIS — O92.79 LACTATION DISORDER, POSTPARTUM CONDITION: ICD-10-CM

## 2023-11-21 DIAGNOSIS — N64.4 BREAST PAIN: Primary | ICD-10-CM

## 2023-11-21 PROCEDURE — 99202 OFFICE O/P NEW SF 15 MIN: CPT | Performed by: OBSTETRICS & GYNECOLOGY

## 2023-11-21 NOTE — PROGRESS NOTES
Assessment/Plan:     No problem-specific Assessment & Plan notes found for this encounter. Diagnoses and all orders for this visit:    Breast pain  -     US breast left limited (diagnostic); Future    Lactation disorder, postpartum condition  -     US breast left limited (diagnostic); Future    Advised cabbage leaves/cold packs. No evidence of infection. [facility]. Can follow up with primary OB/GYN. Subjective:      Patient ID: Evangelista Bruno is a 20 y.o. female who presents for left breast  pain. She was breast feeding but discontinued due to poor latch. Her baby is now 7 months old. She reports weeks' worth of breast pain when lifting her arm and is tender to touch. She also noticed spontaneous milk leakage. She denies skin change, reddening of the skin, fever, chills or myalgias. HPI    The following portions of the patient's history were reviewed and updated as appropriate: allergies, current medications, past family history, past medical history, past social history, past surgical history and problem list.    Review of Systems   Constitutional:  Negative for chills and fever. Objective:      /86 (BP Location: Left arm, Patient Position: Sitting, Cuff Size: Adult)   Pulse 73   Resp 18   Ht 5' 3" (1.6 m)   Wt 82.1 kg (181 lb)   LMP  (LMP Unknown)   BMI 32.06 kg/m²          Physical Exam  Vitals and nursing note reviewed. Constitutional:       Appearance: Normal appearance. Pulmonary:      Effort: Pulmonary effort is normal.   Chest:   Breasts:     Right: No swelling, bleeding, inverted nipple, mass, nipple discharge, skin change or tenderness. Left: Mass and tenderness present. No swelling, bleeding, inverted nipple, nipple discharge or skin change. Comments: Area of dense breast tissue, no focal mass, tender to palpation, no erythema  Neurological:      General: No focal deficit present.       Mental Status: She is alert and oriented to person, place, and time.    Psychiatric:         Mood and Affect: Mood normal.         Behavior: Behavior normal.

## 2024-01-11 DIAGNOSIS — Z00.6 ENCOUNTER FOR EXAMINATION FOR NORMAL COMPARISON OR CONTROL IN CLINICAL RESEARCH PROGRAM: ICD-10-CM

## 2024-03-25 ENCOUNTER — OFFICE VISIT (OUTPATIENT)
Dept: OBGYN CLINIC | Facility: CLINIC | Age: 24
End: 2024-03-25

## 2024-03-25 VITALS
HEIGHT: 63 IN | HEART RATE: 88 BPM | BODY MASS INDEX: 32.06 KG/M2 | DIASTOLIC BLOOD PRESSURE: 83 MMHG | SYSTOLIC BLOOD PRESSURE: 134 MMHG | RESPIRATION RATE: 18 BRPM

## 2024-03-25 DIAGNOSIS — N64.4 BREAST PAIN: Primary | ICD-10-CM

## 2024-03-25 PROBLEM — Z34.92 PRENATAL CARE IN SECOND TRIMESTER: Status: RESOLVED | Noted: 2022-11-18 | Resolved: 2024-03-25

## 2024-03-25 PROBLEM — Z00.00 PHYSICAL EXAM: Status: RESOLVED | Noted: 2019-06-24 | Resolved: 2024-03-25

## 2024-03-25 PROBLEM — B95.1 POSITIVE GBS TEST: Status: RESOLVED | Noted: 2023-05-04 | Resolved: 2024-03-25

## 2024-03-25 PROCEDURE — 99213 OFFICE O/P EST LOW 20 MIN: CPT | Performed by: OBSTETRICS & GYNECOLOGY

## 2024-03-25 RX ORDER — LEVONORGESTREL 19.5 MG/1
1 INTRAUTERINE DEVICE INTRAUTERINE
COMMUNITY

## 2024-03-25 NOTE — PROGRESS NOTES
"Assessment/Plan:     No problem-specific Assessment & Plan notes found for this encounter.          Diagnoses and all orders for this visit:    Breast pain    Expectant management.  No evidence of infection or other concerning process.           Subjective:      Patient ID: Jessa Sal is a 23 y.o. female who presents with acute breast pain on the left side.  Her pain from last visit had resolved and she did not go for ultrasound.  She reports accidentally hitting her breast 2 days ago.      HPI    The following portions of the patient's history were reviewed and updated as appropriate: allergies, current medications, past family history, past medical history, past social history, past surgical history and problem list.    Review of Systems      Objective:      /83 (BP Location: Right arm, Patient Position: Sitting, Cuff Size: Adult)   Pulse 88   Resp 18   Ht 5' 3\" (1.6 m)   LMP  (LMP Unknown)   BMI 32.06 kg/m²          Physical Exam  Vitals and nursing note reviewed.   Constitutional:       Appearance: Normal appearance.   Cardiovascular:      Pulses: Normal pulses.   Chest:   Breasts:     Right: No swelling, bleeding, inverted nipple, mass, nipple discharge, skin change or tenderness.      Left: Tenderness present. No swelling, bleeding, inverted nipple, mass, nipple discharge or skin change.          Comments: Tenderness    Neurological:      General: No focal deficit present.      Mental Status: She is alert and oriented to person, place, and time.   Psychiatric:         Mood and Affect: Mood normal.         Behavior: Behavior normal.         "

## 2024-03-27 ENCOUNTER — TELEPHONE (OUTPATIENT)
Dept: FAMILY MEDICINE CLINIC | Facility: CLINIC | Age: 24
End: 2024-03-27

## 2024-03-27 ENCOUNTER — OFFICE VISIT (OUTPATIENT)
Dept: FAMILY MEDICINE CLINIC | Facility: CLINIC | Age: 24
End: 2024-03-27

## 2024-03-27 VITALS
HEIGHT: 63 IN | WEIGHT: 172.4 LBS | HEART RATE: 102 BPM | BODY MASS INDEX: 30.55 KG/M2 | DIASTOLIC BLOOD PRESSURE: 90 MMHG | RESPIRATION RATE: 16 BRPM | SYSTOLIC BLOOD PRESSURE: 126 MMHG | OXYGEN SATURATION: 98 % | TEMPERATURE: 97.8 F

## 2024-03-27 DIAGNOSIS — Z11.59 NEED FOR HEPATITIS C SCREENING TEST: ICD-10-CM

## 2024-03-27 DIAGNOSIS — R03.0 ELEVATED BLOOD PRESSURE READING: Primary | ICD-10-CM

## 2024-03-27 PROCEDURE — 99213 OFFICE O/P EST LOW 20 MIN: CPT | Performed by: FAMILY MEDICINE

## 2024-03-27 RX ORDER — BLOOD PRESSURE TEST KIT
KIT MISCELLANEOUS DAILY
Qty: 1 KIT | Refills: 0 | Status: SHIPPED | OUTPATIENT
Start: 2024-03-27

## 2024-03-27 NOTE — LETTER
March 27, 2024     Patient: Jessa Sal  YOB: 2000  Date of Visit: 3/27/2024      To Whom it May Concern:    Jessa Sal is under my professional care. Jessa was seen in my office on 3/27/2024. Jessa may return to work on March 28, 2024 .    If you have any questions or concerns, please don't hesitate to call.         Sincerely,          Annabelle Lai MD        CC: No Recipients

## 2024-03-27 NOTE — TELEPHONE ENCOUNTER
Good Morning,     Patient Jessa was in for OVS with  and at checkout wished to change PCP's. She was switched from Moe to Joelle.

## 2024-03-27 NOTE — PROGRESS NOTES
Name: Jessa Sal      : 2000      MRN: [id number]  Encounter Provider: Annabelle Lai MD  Encounter Date: 3/27/2024   Encounter department: [facility]    Assessment & Plan     1. Elevated blood pressure reading  Assessment & Plan:  Bp 126/80 mmhg  Patient reports few episodes at home of elevated BP readings in the 130s-140 systolic and 80s-90 diastolic.     -Low sodium diet  -BP kit   -Advised to keep BP diary and record BP in the mornings for the next 2 weeks and bring readings at next appointment with me   -F/U in 2 weeks to 1 month    Orders:  -     Blood Pressure KIT; Use in the morning    2. Need for hepatitis C screening test           Subjective      Jessa Sal is a very hany patient that presents to clinic today due to episodes of elevated Blood pressure readings. Patient was recently seen by GYN on 2024 and BP at that time was 134/83 mmhg. Patient states she has checked her BP at night and sometimes it reads BP of 130-140 systolic and 80s to 90s diastolic.   Patient reports she had preeclampsia during pregnancy.  Ms. Sal was advised to implement a low sodium diet and to check her BP in the morning and record the values in a paper to evaluate at next visit.  Patient reports she has been very stressed laltely and sometimes gets anxious. Patient encouraged to do breathing exercises.     Hypertension  Pertinent negatives include no chest pain, palpitations or shortness of breath.     Review of Systems   Constitutional:  Negative for chills and fever.   HENT:  Negative for ear pain and sore throat.    Eyes:  Negative for pain and visual disturbance.   Respiratory:  Negative for cough and shortness of breath.    Cardiovascular:  Negative for chest pain and palpitations.   Gastrointestinal:  Negative for abdominal pain and vomiting.   Genitourinary:  Negative for dysuria and hematuria.   Musculoskeletal:  Negative for arthralgias and  "back pain.   Skin:  Negative for color change and rash.   Neurological:  Negative for seizures and syncope.   All other systems reviewed and are negative.      Current Outpatient Medications on File Prior to Visit   Medication Sig    ergocalciferol (VITAMIN D2) 50,000 units Take 1 capsule (50,000 Units total) by mouth once a week (Patient not taking: Reported on 3/25/2024)    levonorgestrel (Kyleena) 19.5 MG intrauterine device 1 each by Intrauterine Device route Once every 5 years    meclizine (ANTIVERT) 25 mg tablet Take 1 tablet (25 mg total) by mouth every 8 (eight) hours as needed for dizziness (Patient not taking: Reported on 3/25/2024)    Prenatal Vit-Fe Fumarate-FA (PRENATAL VITAMINS PO) Take by mouth (Patient not taking: Reported on 3/25/2024)       Objective     /90 (BP Location: Left arm, Patient Position: Sitting, Cuff Size: Standard)   Pulse 102   Temp 97.8 °F (36.6 °C) (Temporal)   Resp 16   Ht 5' 3\" (1.6 m)   Wt 78.2 kg (172 lb 6.4 oz)   LMP  (LMP Unknown)   SpO2 98%   BMI 30.54 kg/m²     Physical Exam  Vitals reviewed.   HENT:      Head: Normocephalic.      Right Ear: External ear normal.      Left Ear: External ear normal.      Nose: Nose normal.      Mouth/Throat:      Mouth: Mucous membranes are moist.   Cardiovascular:      Rate and Rhythm: Normal rate and regular rhythm.   Pulmonary:      Effort: Pulmonary effort is normal.      Breath sounds: Normal breath sounds.   Abdominal:      General: Bowel sounds are normal.   Musculoskeletal:         General: Normal range of motion.   Skin:     General: Skin is warm.   Neurological:      General: No focal deficit present.      Mental Status: She is alert. Mental status is at baseline.       Annabelle Lai MD    "

## 2024-03-27 NOTE — ASSESSMENT & PLAN NOTE
Bp 126/80 mmhg  Patient reports few episodes at home of elevated BP readings in the 130s-140 systolic and 80s-90 diastolic.     -Low sodium diet  -BP kit   -Advised to keep BP diary and record BP in the mornings for the next 2 weeks and bring readings at next appointment with me   -F/U in 2 weeks to 1 month

## 2024-04-12 ENCOUNTER — OFFICE VISIT (OUTPATIENT)
Dept: FAMILY MEDICINE CLINIC | Facility: CLINIC | Age: 24
End: 2024-04-12

## 2024-04-12 VITALS
OXYGEN SATURATION: 98 % | HEIGHT: 63 IN | TEMPERATURE: 98.1 F | SYSTOLIC BLOOD PRESSURE: 125 MMHG | RESPIRATION RATE: 18 BRPM | HEART RATE: 89 BPM | DIASTOLIC BLOOD PRESSURE: 85 MMHG | BODY MASS INDEX: 30.83 KG/M2 | WEIGHT: 174 LBS

## 2024-04-12 DIAGNOSIS — R03.0 ELEVATED BLOOD PRESSURE READING: Primary | ICD-10-CM

## 2024-04-12 NOTE — ASSESSMENT & PLAN NOTE
Patient reports BP in the mornings at home have ranged from 126-140  systolic and 80s diastolic   Initial BP at office 135/85 mmhg and repeat BP prior to ending the visit was 125/85  mmhg  Discussed lifestyle modifications such as weight reduction and a diet low in sodium. Patient agreed.    -Start low sodium diet  -Lose at least 10-15 pounds for next office visit  -daily exercise  -If BP remains above 130s after lifestyle modifications consider adding amlodipine 2.5 mg qd at next office viist (patient declined starting meds at today's encounter)

## 2024-04-12 NOTE — PROGRESS NOTES
Name: Jessa Sal      : 2000      MRN: [id number]  Encounter Provider: Annabelle Lai MD  Encounter Date: 2024   Encounter department: [facility]    Assessment & Plan     1. Elevated blood pressure reading  Assessment & Plan:  Patient reports BP in the mornings at home have ranged from 126-140  systolic and 80s diastolic   Initial BP at office 135/85 mmhg and repeat BP prior to ending the visit was 125/85  mmhg  Discussed lifestyle modifications such as weight reduction and a diet low in sodium. Patient agreed.    -Start low sodium diet  -Lose at least 10-15 pounds for next office visit  -daily exercise  -If BP remains above 130s after lifestyle modifications consider adding amlodipine 2.5 mg qd at next office viist (patient declined starting meds at today's encounter)               Subjective      Jessa Sal is a 24 yo very pleasant female who presents to clinic today to follow up on her elevated BP reading. Patient reports home BP ranges from a448-821 systolic and 80s diastolic. She is motivated to lose weight and implement a diet low in sodium to prevent the need to go on BP medications.  Patient denies chest pain, headaches, sob, n/v/d/c    Hypertension  Pertinent negatives include no chest pain, palpitations or shortness of breath.     Review of Systems   Constitutional:  Negative for chills and fever.   HENT:  Negative for ear pain and sore throat.    Eyes:  Negative for pain and visual disturbance.   Respiratory:  Negative for cough and shortness of breath.    Cardiovascular:  Negative for chest pain and palpitations.   Gastrointestinal:  Negative for abdominal pain and vomiting.   Genitourinary:  Negative for dysuria and hematuria.   Musculoskeletal:  Negative for arthralgias and back pain.   Skin:  Negative for color change and rash.   Neurological:  Negative for seizures and syncope.   All other systems reviewed and are  "negative.      Current Outpatient Medications on File Prior to Visit   Medication Sig    Blood Pressure KIT Use in the morning    ergocalciferol (VITAMIN D2) 50,000 units Take 1 capsule (50,000 Units total) by mouth once a week (Patient not taking: Reported on 3/25/2024)    levonorgestrel (Kyleena) 19.5 MG intrauterine device 1 each by Intrauterine Device route Once every 5 years    meclizine (ANTIVERT) 25 mg tablet Take 1 tablet (25 mg total) by mouth every 8 (eight) hours as needed for dizziness (Patient not taking: Reported on 3/25/2024)    Prenatal Vit-Fe Fumarate-FA (PRENATAL VITAMINS PO) Take by mouth (Patient not taking: Reported on 3/25/2024)       Objective     /85 (BP Location: Left arm, Patient Position: Sitting, Cuff Size: Standard)   Pulse 89   Temp 98.1 °F (36.7 °C) (Temporal)   Resp 18   Ht 5' 3\" (1.6 m)   Wt 78.9 kg (174 lb)   LMP  (LMP Unknown)   SpO2 98%   BMI 30.82 kg/m²     Physical Exam  Vitals reviewed.   HENT:      Head: Normocephalic.      Right Ear: External ear normal.      Left Ear: External ear normal.      Nose: Nose normal.      Mouth/Throat:      Mouth: Mucous membranes are moist.   Cardiovascular:      Rate and Rhythm: Normal rate and regular rhythm.   Pulmonary:      Effort: Pulmonary effort is normal.      Breath sounds: Normal breath sounds.   Abdominal:      General: Bowel sounds are normal.   Musculoskeletal:         General: Normal range of motion.   Skin:     General: Skin is warm.   Neurological:      General: No focal deficit present.      Mental Status: She is alert. Mental status is at baseline.       Annabelle Lai MD    "

## 2024-04-15 ENCOUNTER — TELEPHONE (OUTPATIENT)
Dept: FAMILY MEDICINE CLINIC | Facility: CLINIC | Age: 24
End: 2024-04-15

## 2024-04-15 DIAGNOSIS — I15.8 OTHER SECONDARY HYPERTENSION: Primary | ICD-10-CM

## 2024-04-15 RX ORDER — AMLODIPINE BESYLATE 2.5 MG/1
2.5 TABLET ORAL DAILY
Qty: 30 TABLET | Refills: 2 | Status: SHIPPED | OUTPATIENT
Start: 2024-04-15

## 2024-04-15 NOTE — TELEPHONE ENCOUNTER
Thank you. Please call patient and inform amlodipine 2.5 mg has been sent of to  her [facility] pharmacy located in [place]. I appreciate it

## 2024-04-15 NOTE — TELEPHONE ENCOUNTER
Wisam Villatoro,     Patient Jessa called office on 4/15/24 in regards to requesting medication for high blood pressure. Patient also requested for medication to be sent to pharmacy. Patient was not able to inform me of medication name but stated you would know due to discussing medication with patient at last office visit. Please advise?

## 2024-05-21 DIAGNOSIS — I15.8 OTHER SECONDARY HYPERTENSION: ICD-10-CM

## 2024-05-21 RX ORDER — AMLODIPINE BESYLATE 2.5 MG/1
2.5 TABLET ORAL DAILY
Qty: 30 TABLET | Refills: 2 | Status: SHIPPED | OUTPATIENT
Start: 2024-05-21

## 2024-05-21 NOTE — PROGRESS NOTES
A fetal ultrasound was completed  See Ob procedures in Epic for an interpretation and recommendations  Do not hesitate to contact us in [facility] if you have questions  Noah Das MD, [id number] [facility]  Maternal Fetal Medicine Include Location In Plan?: No Detail Level: Zone

## 2024-06-21 DIAGNOSIS — I15.8 OTHER SECONDARY HYPERTENSION: ICD-10-CM

## 2024-06-21 RX ORDER — AMLODIPINE BESYLATE 2.5 MG/1
2.5 TABLET ORAL DAILY
Qty: 90 TABLET | Refills: 1 | Status: SHIPPED | OUTPATIENT
Start: 2024-06-21

## 2024-07-30 ENCOUNTER — OFFICE VISIT (OUTPATIENT)
Dept: OBGYN CLINIC | Facility: CLINIC | Age: 24
End: 2024-07-30

## 2024-07-30 VITALS — HEIGHT: 63 IN | BODY MASS INDEX: 30.82 KG/M2 | RESPIRATION RATE: 18 BRPM

## 2024-07-30 DIAGNOSIS — R39.9 UTI SYMPTOMS: Primary | ICD-10-CM

## 2024-07-30 LAB
SL AMB  POCT GLUCOSE, UA: NORMAL
SL AMB LEUKOCYTE ESTERASE,UA: NORMAL
SL AMB POCT BILIRUBIN,UA: NORMAL
SL AMB POCT BLOOD,UA: NORMAL
SL AMB POCT CLARITY,UA: CLEAR
SL AMB POCT COLOR,UA: YELLOW
SL AMB POCT KETONES,UA: NORMAL
SL AMB POCT NITRITE,UA: NORMAL
SL AMB POCT PH,UA: 6
SL AMB POCT SPECIFIC GRAVITY,UA: 1.02
SL AMB POCT URINE PROTEIN: NORMAL
SL AMB POCT UROBILINOGEN: NORMAL

## 2024-07-30 PROCEDURE — 99213 OFFICE O/P EST LOW 20 MIN: CPT | Performed by: OBSTETRICS & GYNECOLOGY

## 2024-07-30 PROCEDURE — 81003 URINALYSIS AUTO W/O SCOPE: CPT | Performed by: OBSTETRICS & GYNECOLOGY

## 2024-07-30 RX ORDER — SULFAMETHOXAZOLE AND TRIMETHOPRIM 800; 160 MG/1; MG/1
1 TABLET ORAL EVERY 12 HOURS SCHEDULED
Qty: 6 TABLET | Refills: 0 | Status: SHIPPED | OUTPATIENT
Start: 2024-07-30 | End: 2024-08-02

## 2024-07-30 NOTE — PROGRESS NOTES
"Assessment/Plan:     No problem-specific Assessment & Plan notes found for this encounter.          Diagnoses and all orders for this visit:    UTI symptoms  -     POCT urine dip auto non-scope  -     Cancel: Urine culture  -     sulfamethoxazole-trimethoprim (BACTRIM DS) 800-160 mg per tablet; Take 1 tablet by mouth every 12 (twelve) hours for 3 days      RTO prn.          Subjective:      Patient ID: Jessa Sal is a 23 y.o. female who presents for urinary frequency and intermittent burning with urination.  She has had those symptoms for 4 days.  She has vaginal spotting on her Mirena.      HPI    The following portions of the patient's history were reviewed and updated as appropriate: allergies, current medications, past family history, past medical history, past social history, past surgical history and problem list.    Review of Systems      Objective:      Resp 18   Ht 5' 3\" (1.6 m)   BMI 30.82 kg/m²          Physical Exam  Vitals and nursing note reviewed.   Constitutional:       Appearance: Normal appearance.   Pulmonary:      Effort: Pulmonary effort is normal.   Neurological:      General: No focal deficit present.      Mental Status: She is alert and oriented to person, place, and time.   Psychiatric:         Mood and Affect: Mood normal.         Behavior: Behavior normal.           "

## 2024-08-14 ENCOUNTER — OFFICE VISIT (OUTPATIENT)
Dept: FAMILY MEDICINE CLINIC | Facility: CLINIC | Age: 24
End: 2024-08-14

## 2024-08-14 VITALS
WEIGHT: 171 LBS | OXYGEN SATURATION: 96 % | HEIGHT: 63 IN | RESPIRATION RATE: 18 BRPM | BODY MASS INDEX: 30.3 KG/M2 | TEMPERATURE: 98.7 F | DIASTOLIC BLOOD PRESSURE: 92 MMHG | SYSTOLIC BLOOD PRESSURE: 133 MMHG | HEART RATE: 92 BPM

## 2024-08-14 DIAGNOSIS — I37.0 NONRHEUMATIC PULMONARY VALVE STENOSIS: ICD-10-CM

## 2024-08-14 DIAGNOSIS — Z11.59 NEED FOR HEPATITIS C SCREENING TEST: ICD-10-CM

## 2024-08-14 DIAGNOSIS — I10 PRIMARY HYPERTENSION: Primary | ICD-10-CM

## 2024-08-14 DIAGNOSIS — E66.09 CLASS 1 OBESITY DUE TO EXCESS CALORIES WITH SERIOUS COMORBIDITY AND BODY MASS INDEX (BMI) OF 30.0 TO 30.9 IN ADULT: ICD-10-CM

## 2024-08-14 PROCEDURE — 99214 OFFICE O/P EST MOD 30 MIN: CPT | Performed by: FAMILY MEDICINE

## 2024-08-14 RX ORDER — AMLODIPINE BESYLATE 2.5 MG/1
2.5 TABLET ORAL DAILY
Qty: 90 TABLET | Refills: 1 | Status: SHIPPED | OUTPATIENT
Start: 2024-08-14

## 2024-08-14 NOTE — ASSESSMENT & PLAN NOTE
History of congenital pulmonary valve stenosis  -Reviewed most recent echocardiogram from 2023 which appears stable  -Will continue to monitor

## 2024-08-14 NOTE — ASSESSMENT & PLAN NOTE
-Reviewed ambulatory blood pressure monitoring with patient.  Systolic blood pressure ranges between 130s and 140.  Diastolic blood pressure consistently in the 90s.  -Diagnosed with pre-eclamsia during pregnancy over a year ago  -Has made some dietary modifications particularly low-sodium diet  -Patient has family history of hypertension  -She was prescribed amlodipine which she takes inconsistently  -Recommend taking amlodipine once daily  -Continue to work on lifestyle modification  -Bring blood pressure log next visit

## 2024-08-14 NOTE — LETTER
August 14, 2024     Patient: Jessa Sal  YOB: 2000  Date of Visit: 8/14/2024      To Whom it May Concern:    Jessa Sal is under my professional care. Jessa was seen in my office on 8/14/2024. Jessa may return to work on 8/15/2024 .    If you have any questions or concerns, please don't hesitate to call.         Sincerely,          Rodolfo Nichols MD        CC: No Recipients

## 2024-08-14 NOTE — PROGRESS NOTES
Ambulatory Visit  Name: Jessa Sal      : 2000      MRN: [id number]  Encounter Provider: Rodolfo Nichols MD  Encounter Date: 2024   [facility]    Assessment & Plan   1. Primary hypertension  Assessment & Plan:  -Reviewed ambulatory blood pressure monitoring with patient.  Systolic blood pressure ranges between 130s and 140.  Diastolic blood pressure consistently in the 90s.  -Diagnosed with pre-eclamsia during pregnancy over a year ago  -Has made some dietary modifications particularly low-sodium diet  -Patient has family history of hypertension  -She was prescribed amlodipine which she takes inconsistently  -Recommend taking amlodipine once daily  -Continue to work on lifestyle modification  -Bring blood pressure log next visit  Orders:  -     amLODIPine (NORVASC) 2.5 mg tablet; Take 1 tablet (2.5 mg total) by mouth daily  -     Comprehensive metabolic panel; Future  2. Class 1 obesity due to excess calories with serious comorbidity and body mass index (BMI) of 30.0 to 30.9 in adult  -     Lipid panel; Future  -     Hemoglobin A1C; Future  -     Comprehensive metabolic panel; Future  3. Need for hepatitis C screening test  -     Hepatitis C Antibody; Future  4. Nonrheumatic pulmonary valve stenosis  Assessment & Plan:  History of congenital pulmonary valve stenosis  -Reviewed most recent echocardiogram from  which appears stable  -Will continue to monitor    BMI Counseling: Body mass index is 30.29 kg/m². The BMI is above normal. Nutrition recommendations include decreasing portion sizes, encouraging healthy choices of fruits and vegetables, decreasing fast food intake, consuming healthier snacks, limiting drinks that contain sugar, moderation in carbohydrate intake and reducing intake of cholesterol. Exercise recommendations include moderate physical activity 150 minutes/week and obtaining a gym membership. Rationale for BMI  "follow-up plan is due to patient being overweight or obese.       History of Present Illness     23-year-old female with a history of preeclampsia who presents today for follow-up.  She is doing well overall.  She is concerned about her blood pressure.  She has been checking her blood pressure at home regularly.  She was recently prescribed amlodipine but she admits that she does not take this regularly.        Review of Systems   Constitutional:  Negative for chills, diaphoresis, fatigue and fever.   Respiratory:  Negative for shortness of breath and wheezing.    Cardiovascular:  Negative for chest pain, palpitations and leg swelling.   Gastrointestinal:  Negative for abdominal pain, nausea and vomiting.   Musculoskeletal:  Negative for back pain.   Skin:  Negative for rash.   Neurological:  Negative for dizziness, syncope, weakness and light-headedness.       Objective     /92 (BP Location: Right arm, Patient Position: Sitting, Cuff Size: Standard)   Pulse 92   Temp 98.7 °F (37.1 °C) (Temporal)   Resp 18   Ht 5' 3\" (1.6 m)   Wt 77.6 kg (171 lb)   LMP  (LMP Unknown)   SpO2 96%   Breastfeeding No   BMI 30.29 kg/m²     Physical Exam  Constitutional:       General: She is not in acute distress.     Appearance: Normal appearance. She is well-developed. She is not ill-appearing, toxic-appearing or diaphoretic.   HENT:      Head: Normocephalic and atraumatic.      Right Ear: External ear normal.      Left Ear: External ear normal.      Mouth/Throat:      Pharynx: No oropharyngeal exudate.   Eyes:      General: No scleral icterus.        Right eye: No discharge.         Left eye: No discharge.      Extraocular Movements: Extraocular movements intact.      Pupils: Pupils are equal, round, and reactive to light.   Cardiovascular:      Rate and Rhythm: Normal rate and regular rhythm.      Heart sounds: Murmur heard.      No friction rub. No gallop.   Pulmonary:      Effort: Pulmonary effort is normal. No " respiratory distress.      Breath sounds: No stridor. No wheezing.   Abdominal:      General: Bowel sounds are normal. There is no distension.      Palpations: Abdomen is soft. There is no mass.      Tenderness: There is no abdominal tenderness. There is no guarding.   Musculoskeletal:         General: Normal range of motion.      Cervical back: Normal range of motion.   Skin:     General: Skin is warm.      Capillary Refill: Capillary refill takes less than 2 seconds.   Neurological:      General: No focal deficit present.      Mental Status: She is alert and oriented to person, place, and time.      Cranial Nerves: No cranial nerve deficit.      Motor: No weakness.      Gait: Gait normal.       Administrative Statements

## 2025-01-20 NOTE — PROGRESS NOTES
Patient chose to have [facility] Non-invasive Prenatal Screen with fetal sex  Patient given brochure and is aware [facility] will contact their insurance and coordinate coverage  Patient made aware she will need to respond to text message or e-mail from [facility] within 2 business days or testing will be run through insurance  Patient informed text message will come from area code  "415"  Provided [employer] # [phone] and web site : [email or web address]    "Martin your test online" card with barcode and test tube ID provided to patient  Reviewed [facility] web site states 5-7 business days for results via their portal    [employer] [place] message will be sent to patient when MFM receives results /provider reviews  2 vials of blood drawn from  RIGHT  arm by Margy Bond RN  Patient tolerated blood draw without difficulty  Specimens labeled with patient identifiers (name, date of birth, specimen collection date), order and specimen were verified with patient, packed and sent via rVue  Copy of lab order scanned to Epic media  [facility] will have results in approximately 7-10 business days and will call patient or notify via [address]  Patient aware viewing lab result online will reveal fetal sex if ordered  Patient verbalized understanding of all instructions and no questions at this time  Clothing

## 2025-02-19 ENCOUNTER — ANNUAL EXAM (OUTPATIENT)
Dept: OBGYN CLINIC | Facility: CLINIC | Age: 25
End: 2025-02-19
Payer: COMMERCIAL

## 2025-02-19 VITALS
HEIGHT: 63 IN | DIASTOLIC BLOOD PRESSURE: 68 MMHG | SYSTOLIC BLOOD PRESSURE: 118 MMHG | WEIGHT: 162 LBS | BODY MASS INDEX: 28.7 KG/M2

## 2025-02-19 DIAGNOSIS — Z97.5 IUD (INTRAUTERINE DEVICE) IN PLACE: ICD-10-CM

## 2025-02-19 DIAGNOSIS — Z01.419 ENCOUNTER FOR WELL WOMAN EXAM WITH ROUTINE GYNECOLOGICAL EXAM: Primary | ICD-10-CM

## 2025-02-19 PROCEDURE — G0145 SCR C/V CYTO,THINLAYER,RESCR: HCPCS | Performed by: PATHOLOGY

## 2025-02-19 PROCEDURE — S0612 ANNUAL GYNECOLOGICAL EXAMINA: HCPCS | Performed by: OBSTETRICS & GYNECOLOGY

## 2025-02-19 PROCEDURE — 87624 HPV HI-RISK TYP POOLED RSLT: CPT | Performed by: OBSTETRICS & GYNECOLOGY

## 2025-02-19 NOTE — PROGRESS NOTES
ASSESSMENT & PLAN: Jessa Sal is a 24 y.o.  with normal gynecologic exam.    1.  Routine well woman exam done today  2.  Pap:  The patient's last pap was .    It was normal.    Pap was done today.    Current ASCCP Guidelines reviewed.   3.  STD testing  was not done   4.  Kyleena  in  place    5. The following were reviewed in today's visit: family planning choices, exercise, and healthy diet      CC:  Annual Gynecologic Examination    HPI: Jessa Sal is a 24 y.o.  who presents for annual gynecologic examination.    She has the following concerns:  none    Health Maintenance:    She wears her seatbelt routinely.    She does perform regular monthly self breast exams.    She feels safe at home.     Past Medical History:   Diagnosis Date    Heart murmur     Right ovarian cyst 2020    Varicella        Past Surgical History:   Procedure Laterality Date    NC  DELIVERY ONLY N/A 2023    Procedure:  SECTION ();  Surgeon: Zarina Ruiz MD;  Location: [facility];  Service: Obstetrics    TONSILLECTOMY         OB/Gyn History:    Pt does not have menstrual issues.     History of sexually transmitted infection: No.  History of abnormal pap smears: No .    Patient is currently sexually active.    The current method of family planning is IUD.    OB History          1    Para   1    Term   0       1    AB   0    Living   1         SAB   0    IAB   0    Ectopic   0    Multiple   0    Live Births   1                 Family History   Problem Relation Age of Onset    No Known Problems Mother     No Known Problems Father     No Known Problems Brother     Diabetes Maternal Grandmother     Coronary artery disease Maternal Grandfather     Lung cancer Paternal Grandmother     Diabetes Paternal Grandfather     Breast cancer Paternal Aunt         40s    Liver cancer Paternal Aunt     Colon cancer Neg Hx     Ovarian cancer Neg Hx        Social History:  Social  History     Socioeconomic History    Marital status: Single     Spouse name: Not on file    Number of children: Not on file    Years of education: Not on file    Highest education level: Not on file   Occupational History    Not on file   Tobacco Use    Smoking status: Never     Passive exposure: Never    Smokeless tobacco: Never   Vaping Use    Vaping status: Never Used   Substance and Sexual Activity    Alcohol use: Not Currently    Drug use: Never    Sexual activity: Yes     Partners: Male     Birth control/protection: I.U.D.   Other Topics Concern    Not on file   Social History Narrative    Not on file     Social Drivers of Health     Financial Resource Strain: Low Risk  (8/14/2024)    Overall Financial Resource Strain (CARDIA)     Difficulty of Paying Living Expenses: Not hard at all   Food Insecurity: No Food Insecurity (8/14/2024)    Nursing - Inadequate Food Risk Classification     Worried About Running Out of Food in the Last Year: Never true     Ran Out of Food in the Last Year: Never true     Ran Out of Food in the Last Year: Not on file   Transportation Needs: No Transportation Needs (8/14/2024)    PRAPARE - Transportation     Lack of Transportation (Medical): No     Lack of Transportation (Non-Medical): No   Physical Activity: Not on file   Stress: Not on file   Social Connections: Not on file   Intimate Partner Violence: Not on file   Housing Stability: Low Risk  (8/14/2024)    Housing Stability Vital Sign     Unable to Pay for Housing in the Last Year: No     Number of Times Moved in the Last Year: 0     Homeless in the Last Year: No       No Known Allergies      Current Outpatient Medications:     amLODIPine (NORVASC) 2.5 mg tablet, Take 1 tablet (2.5 mg total) by mouth daily, Disp: 90 tablet, Rfl: 1    Blood Pressure KIT, Use in the morning, Disp: 1 kit, Rfl: 0    levonorgestrel (Kyleena) 19.5 MG intrauterine device, 1 each by Intrauterine Device route Once every 5 years, Disp: , Rfl:      "ergocalciferol (VITAMIN D2) 50,000 units, Take 1 capsule (50,000 Units total) by mouth once a week (Patient not taking: Reported on 3/25/2024), Disp: 12 capsule, Rfl: 1    meclizine (ANTIVERT) 25 mg tablet, Take 1 tablet (25 mg total) by mouth every 8 (eight) hours as needed for dizziness (Patient not taking: Reported on 3/25/2024), Disp: 30 tablet, Rfl: 1    Prenatal Vit-Fe Fumarate-FA (PRENATAL VITAMINS PO), Take by mouth (Patient not taking: Reported on 3/25/2024), Disp: , Rfl:     Review of Systems:  Constitutional :no fever, feels well, no tiredness, no recent weight gain or loss  ENT: no ear ache, no loss of hearing, no nosebleeds or nasal discharge, no sore throat or hoarseness.  Cardiovascular: no complaints of slow or fast heart beat, no chest pain, no palpitations, no leg claudication or lower extremity edema.  Respiratory: no complaints of shortness of shortness of breath, no ARIAS  Breasts:no complaints of breast pain, breast lump, or nipple discharge  Gastrointestinal: no complaints of abdominal pain, constipation, nausea, vomiting, or diarrhea or bloody stools  Genitourinary : no complaints of dysuria, incontinence, pelvic pain, no dysmenorrhea, vaginal discharge or abnormal vaginal bleeding and as noted in HPI.  Musculoskeletal: no complaints of arthralgia, no myalgia, no joint swelling or stiffness, no limb pain or swelling.  Integumentary: no complaints of skin rash or lesion, itching or dry skin  Neurological: no complaints of headache, no confusion, no numbness or tingling, no dizziness or fainting    Objective      /68   Ht 5' 3\" (1.6 m)   Wt 73.5 kg (162 lb)   LMP  (LMP Unknown)   BMI 28.70 kg/m²     General:   appears stated age, cooperative, alert normal mood and affect   Lungs: Unlabored     Breasts: normal appearance, no masses or tenderness, Inspection negative, No nipple retraction or dimpling, No nipple discharge or bleeding, No axillary or supraclavicular adenopathy, Normal to " palpation without dominant masses   Abdomen: soft, non-tender, without masses or organomegaly   Vulva: normal, normal female genitalia, Bartholin's, Urethra, [place] normal, no lesions, normal female hair distribution, no clitoral enlargement   Vagina: normal vagina, no discharge, exudate, lesion, or erythema   Urethra: normal   Cervix: Normal, no discharge. Nontender. Iud strings present    Uterus: normal size, contour, position, consistency, mobility, non-tender   Adnexa: no mass, fullness, tenderness   Psychiatric orientation to person, place, and time: normal. mood and affect: normal

## 2025-02-26 LAB
HPV HR 12 DNA CVX QL NAA+PROBE: POSITIVE
HPV16 DNA CVX QL NAA+PROBE: NEGATIVE
HPV18 DNA CVX QL NAA+PROBE: NEGATIVE
LAB AP GYN PRIMARY INTERPRETATION: ABNORMAL
Lab: ABNORMAL
PATH INTERP SPEC-IMP: ABNORMAL

## 2025-02-26 PROCEDURE — G0124 SCREEN C/V THIN LAYER BY MD: HCPCS | Performed by: PATHOLOGY

## 2025-02-27 ENCOUNTER — RESULTS FOLLOW-UP (OUTPATIENT)
Dept: OBGYN CLINIC | Facility: MEDICAL CENTER | Age: 25
End: 2025-02-27

## 2025-02-27 NOTE — RESULT ENCOUNTER NOTE
Please inform patient ASCUS pap and  HPV testing positive  / recommendation is to have  colposcopy scheduled

## 2025-03-31 ENCOUNTER — OFFICE VISIT (OUTPATIENT)
Dept: FAMILY MEDICINE CLINIC | Facility: CLINIC | Age: 25
End: 2025-03-31

## 2025-03-31 VITALS
OXYGEN SATURATION: 97 % | RESPIRATION RATE: 17 BRPM | WEIGHT: 160.4 LBS | HEIGHT: 63 IN | TEMPERATURE: 97.9 F | BODY MASS INDEX: 28.42 KG/M2 | HEART RATE: 91 BPM | DIASTOLIC BLOOD PRESSURE: 78 MMHG | SYSTOLIC BLOOD PRESSURE: 112 MMHG

## 2025-03-31 DIAGNOSIS — R10.84 GENERALIZED ABDOMINAL PAIN: Primary | ICD-10-CM

## 2025-03-31 PROCEDURE — 99213 OFFICE O/P EST LOW 20 MIN: CPT | Performed by: INTERNAL MEDICINE

## 2025-03-31 RX ORDER — ONDANSETRON 4 MG/1
4 TABLET, FILM COATED ORAL EVERY 8 HOURS PRN
Qty: 20 TABLET | Refills: 0 | Status: SHIPPED | OUTPATIENT
Start: 2025-03-31

## 2025-03-31 NOTE — PROGRESS NOTES
Name: Jessa Sal      : 2000      MRN: [id number]  Encounter Provider: Annabelle Lai MD  Encounter Date: 3/31/2025   Encounter department: [facility]  :  Assessment & Plan  Generalized abdominal pain  DDX: H. Pylori infection vs. GLP1 side effect vs. Parasite in stool     -zofran prn  -H.pylori stool testing  -Stool ova and parasite   -F/U w/ results   Orders:    H. pylori antigen, stool; Future    ondansetron (ZOFRAN) 4 mg tablet; Take 1 tablet (4 mg total) by mouth every 8 (eight) hours as needed for nausea or vomiting    Ova and parasite examination; Future           History of Present Illness   23 yo pleasant female presents for same day visit due to c/o generalized abdominal pain for a few weeks now a/w nausea and intermittent loose brown stools. Of note patient was in [place] and reports she was eating out while on vacation including having some salads but not in excess. She noticed symptoms getting worse upon returning to the [place]. Patient has been on a GLP1 for apx 6 months now but reports these abdominal symptom are new to her.  She denies v/c or systemic symptoms. Patient denies recent antibiotic use.        Review of Systems   Constitutional:  Negative for chills and fever.   HENT:  Negative for ear pain and sore throat.    Eyes:  Negative for pain and visual disturbance.   Respiratory:  Negative for cough and shortness of breath.    Cardiovascular:  Negative for chest pain and palpitations.   Gastrointestinal:  Positive for abdominal pain, diarrhea and nausea. Negative for anal bleeding, blood in stool, constipation, rectal pain and vomiting.   Genitourinary:  Negative for dysuria and hematuria.   Musculoskeletal:  Negative for arthralgias and back pain.   Skin:  Negative for color change and rash.   Neurological:  Negative for seizures and syncope.   All other systems reviewed and are negative.      Objective   /78 (BP Location:  "Right arm, Patient Position: Sitting, Cuff Size: Standard)   Pulse 91   Temp 97.9 °F (36.6 °C) (Temporal)   Resp 17   Ht 5' 3\" (1.6 m)   Wt 72.8 kg (160 lb 6.4 oz)   SpO2 97%   BMI 28.41 kg/m²      Physical Exam  Vitals and nursing note reviewed.   Constitutional:       General: She is not in acute distress.     Appearance: She is well-developed.   HENT:      Head: Normocephalic and atraumatic.   Eyes:      Conjunctiva/sclera: Conjunctivae normal.   Cardiovascular:      Rate and Rhythm: Normal rate and regular rhythm.      Heart sounds: No murmur heard.  Pulmonary:      Effort: Pulmonary effort is normal. No respiratory distress.      Breath sounds: Normal breath sounds.   Abdominal:      General: Abdomen is flat. Bowel sounds are normal.      Palpations: Abdomen is soft. There is no shifting dullness, hepatomegaly or splenomegaly.      Tenderness: There is no abdominal tenderness.      Comments: No tenderness upon palpation on abdominal exam    Musculoskeletal:         General: No swelling.      Cervical back: Neck supple.   Skin:     General: Skin is warm and dry.      Capillary Refill: Capillary refill takes less than 2 seconds.   Neurological:      Mental Status: She is alert.   Psychiatric:         Mood and Affect: Mood normal.         "

## 2025-04-21 ENCOUNTER — OFFICE VISIT (OUTPATIENT)
Dept: FAMILY MEDICINE CLINIC | Facility: CLINIC | Age: 25
End: 2025-04-21

## 2025-04-21 VITALS
RESPIRATION RATE: 16 BRPM | DIASTOLIC BLOOD PRESSURE: 72 MMHG | SYSTOLIC BLOOD PRESSURE: 112 MMHG | BODY MASS INDEX: 28 KG/M2 | TEMPERATURE: 97.2 F | OXYGEN SATURATION: 99 % | WEIGHT: 158 LBS | HEART RATE: 84 BPM | HEIGHT: 63 IN

## 2025-04-21 DIAGNOSIS — R10.84 GENERALIZED ABDOMINAL PAIN: Primary | ICD-10-CM

## 2025-04-21 DIAGNOSIS — R11.0 NAUSEA: ICD-10-CM

## 2025-04-21 PROCEDURE — 99213 OFFICE O/P EST LOW 20 MIN: CPT | Performed by: FAMILY MEDICINE

## 2025-04-21 RX ORDER — ONDANSETRON 4 MG/1
4 TABLET, FILM COATED ORAL EVERY 8 HOURS PRN
Qty: 20 TABLET | Refills: 0 | Status: SHIPPED | OUTPATIENT
Start: 2025-04-21

## 2025-04-21 NOTE — PROGRESS NOTES
Name: Jessa Sal      : 2000      MRN: [id number]  Encounter Provider: Annabelle Lai MD  Encounter Date: 2025   Encounter department: [facility]  :  Assessment & Plan  Generalized abdominal pain  Been present for apx 1 month a/w nausea, constipation, intermittent diarrhea, halitosis. Not associated with food intake  Of note patient was previously on ozempic (last dose apx 3 weeks ago) and was in [place] apx 1 month ago.  Patient request ref to GI     DDX: Ozempic side effect vs. Gastroparesis vs. H. Pylori vs. GERD    -Pending H. Pylori test  -zofran prn   -Continue to monitor  -ref to GI   Orders:    Ambulatory Referral to Gastroenterology; Future    Nausea    Orders:    ondansetron (ZOFRAN) 4 mg tablet; Take 1 tablet (4 mg total) by mouth every 8 (eight) hours as needed for nausea or vomiting           History of Present Illness   Very pleasant 23 yo female presents for ongoing abdominal pain for a little over a month. She describes the pain as being generalized a/w nausea, intermittent bad breath, gassy, constipation, intermittent diarrhea and feeling of fullness. Patient denies epigastric pain radiating to back. Pain is not associated with fluid or food intake.   Of note patient was previously using ozempic in which her last dose was apx 3 weeks from today and was vacationing in [place] apx 1 month ago. Patient counseled that ozempic can last in the body until apx 2 months and may take time for side effects to subside. Patient will also try to get stool studies done to rule out H. Pylori which was discussed on our prior visit .      Review of Systems   Constitutional:  Negative for chills and fever.   HENT:  Negative for ear pain and sore throat.    Eyes:  Negative for pain and visual disturbance.   Respiratory:  Negative for cough and shortness of breath.    Cardiovascular:  Negative for chest pain and palpitations.  "  Gastrointestinal:  Positive for abdominal pain, constipation and nausea. Negative for vomiting.   Genitourinary:  Negative for dysuria and hematuria.   Musculoskeletal:  Negative for arthralgias and back pain.   Skin:  Negative for color change and rash.   Neurological:  Negative for seizures and syncope.   All other systems reviewed and are negative.      Objective   /72 (BP Location: Right arm, Patient Position: Sitting, Cuff Size: Standard)   Pulse 84   Temp (!) 97.2 °F (36.2 °C) (Temporal)   Resp 16   Ht 5' 3\" (1.6 m)   Wt 71.7 kg (158 lb)   SpO2 99%   BMI 27.99 kg/m²      Physical Exam  Vitals and nursing note reviewed.   Constitutional:       General: She is not in acute distress.     Appearance: She is well-developed.   HENT:      Head: Normocephalic and atraumatic.   Eyes:      Conjunctiva/sclera: Conjunctivae normal.   Cardiovascular:      Rate and Rhythm: Normal rate and regular rhythm.      Heart sounds: No murmur heard.  Pulmonary:      Effort: Pulmonary effort is normal. No respiratory distress.      Breath sounds: Normal breath sounds.   Abdominal:      Palpations: Abdomen is soft.      Tenderness: There is no abdominal tenderness.   Musculoskeletal:         General: No swelling.      Cervical back: Neck supple.   Skin:     General: Skin is warm and dry.      Capillary Refill: Capillary refill takes less than 2 seconds.   Neurological:      Mental Status: She is alert.   Psychiatric:         Mood and Affect: Mood normal.         "

## 2025-05-05 ENCOUNTER — PROCEDURE VISIT (OUTPATIENT)
Dept: OBGYN CLINIC | Facility: MEDICAL CENTER | Age: 25
End: 2025-05-05
Payer: COMMERCIAL

## 2025-05-05 VITALS
WEIGHT: 159 LBS | BODY MASS INDEX: 28.17 KG/M2 | DIASTOLIC BLOOD PRESSURE: 68 MMHG | SYSTOLIC BLOOD PRESSURE: 116 MMHG | HEIGHT: 63 IN

## 2025-05-05 DIAGNOSIS — R87.610 ATYPICAL SQUAMOUS CELLS OF UNDETERMINED SIGNIFICANCE ON CYTOLOGIC SMEAR OF CERVIX (ASC-US): Primary | ICD-10-CM

## 2025-05-05 DIAGNOSIS — R87.618 PAP SMEAR ABNORMALITY OF CERVIX/HUMAN PAPILLOMAVIRUS (HPV) POSITIVE: ICD-10-CM

## 2025-05-05 PROCEDURE — 88305 TISSUE EXAM BY PATHOLOGIST: CPT | Performed by: PATHOLOGY

## 2025-05-05 PROCEDURE — 57454 BX/CURETT OF CERVIX W/SCOPE: CPT | Performed by: OBSTETRICS & GYNECOLOGY

## 2025-05-05 NOTE — PROGRESS NOTES
Colposcopy    Date/Time: 5/5/2025 4:00 PM    Performed by: Zarina Ruiz MD  Authorized by: Zarina Ruiz MD    Other Assisting Provider: No    Verbal consent obtained?: Yes    Written consent obtained?: Yes    Risks and benefits: Risks, benefits and alternatives were discussed    Consent given by:  Patient  Time Out:     Time out: Immediately prior to the procedure a time out was called    Patient states understanding of procedure being performed: Yes    Patient's understanding of procedure matches consent: Yes    Procedure consent matches procedure scheduled: Yes    Patient identity confirmed:  Verbally with patient  Pre-procedure:     Prepped with: acetic acid    Indication:     Indication:  ASC-US  Procedure:     Procedure: Colposcopy w/ cervical biopsy and ECC      Cobb speculum was placed in the vagina: yes      Under colposcopic examination the transition zone was seen in entirety: no      Endocervix was curetted using a Kevorkian curette: yes      Cervical biopsy performed with a cervical biopsy punch: yes      Monsel's solution was applied: yes      Specimen(s) to pathology: yes    Post-procedure:     Findings: White epithelium      Impression: Low grade cervical dysplasia      Patient tolerance of procedure:  Tolerated well, no immediate complications  Comments:      Biposy at  5 o clock and  ecc done   IUD strings preset

## 2025-05-09 PROCEDURE — 88305 TISSUE EXAM BY PATHOLOGIST: CPT | Performed by: PATHOLOGY

## 2025-05-26 ENCOUNTER — RESULTS FOLLOW-UP (OUTPATIENT)
Dept: OBGYN CLINIC | Facility: MEDICAL CENTER | Age: 25
End: 2025-05-26

## 2025-05-26 NOTE — RESULT ENCOUNTER NOTE
Please inform patient tissue from colposcopy does not  show  malignancy   Recommend  re pap in  one  year

## 2025-07-18 ENCOUNTER — OCCMED (OUTPATIENT)
Dept: URGENT CARE | Facility: CLINIC | Age: 25
End: 2025-07-18

## 2025-07-18 ENCOUNTER — APPOINTMENT (OUTPATIENT)
Dept: LAB | Facility: CLINIC | Age: 25
End: 2025-07-18
Attending: PHYSICIAN ASSISTANT

## 2025-07-18 DIAGNOSIS — Z02.1 PRE-EMPLOYMENT EXAMINATION: Primary | ICD-10-CM

## 2025-07-18 DIAGNOSIS — Z02.1 PRE-EMPLOYMENT EXAMINATION: ICD-10-CM

## 2025-07-18 LAB — RUBV IGG SERPL IA-ACNC: 16.1 IU/ML

## 2025-07-18 PROCEDURE — 86765 RUBEOLA ANTIBODY: CPT

## 2025-07-18 PROCEDURE — 86787 VARICELLA-ZOSTER ANTIBODY: CPT

## 2025-07-18 PROCEDURE — 86762 RUBELLA ANTIBODY: CPT

## 2025-07-18 PROCEDURE — 86480 TB TEST CELL IMMUN MEASURE: CPT

## 2025-07-18 PROCEDURE — 86735 MUMPS ANTIBODY: CPT

## 2025-07-18 PROCEDURE — 36415 COLL VENOUS BLD VENIPUNCTURE: CPT

## 2025-07-19 LAB
GAMMA INTERFERON BACKGROUND BLD IA-ACNC: 0.04 IU/ML
M TB IFN-G BLD-IMP: NEGATIVE
M TB IFN-G CD4+ BCKGRND COR BLD-ACNC: 0.03 IU/ML
M TB IFN-G CD4+ BCKGRND COR BLD-ACNC: 0.04 IU/ML
MEV IGG SER QL IA: <5 AU/ML
MEV IGG SER QL IA: NEGATIVE
MITOGEN IGNF BCKGRD COR BLD-ACNC: 9.96 IU/ML
MUV IGG SER QL IA: 42.1 AU/ML
MUV IGG SER QL IA: POSITIVE
VZV IGG SER QL IA: 0.32 S/CO
VZV IGG SER QL IA: NEGATIVE

## (undated) DEVICE — SUT VICRYL 0 CT 36 IN J958H

## (undated) DEVICE — SUT MONOCRYL 4-0 PS-2 27 IN Y426H

## (undated) DEVICE — CHLORAPREP HI-LITE 26ML ORANGE

## (undated) DEVICE — SUT PLAIN 3-0 CT-1 27 IN 842H

## (undated) DEVICE — ADHESIVE SKN CLSR HISTOACRYL FLEX 0.5ML LF

## (undated) DEVICE — ADHESIVE SKIN HIGH VISCOSITY EXOFIN 1ML

## (undated) DEVICE — PACK C-SECTION PBDS

## (undated) DEVICE — SUT VICRYL 2-0 CT-1 36 IN J945H

## (undated) DEVICE — GLOVE INDICATOR UNDERGLOVE SZ 6 BLUE

## (undated) DEVICE — GLOVE SRG BIOGEL ECLIPSE 6

## (undated) DEVICE — ABG MICROSTICKS SAFETY